# Patient Record
Sex: FEMALE | Race: BLACK OR AFRICAN AMERICAN | NOT HISPANIC OR LATINO | Employment: OTHER | URBAN - METROPOLITAN AREA
[De-identification: names, ages, dates, MRNs, and addresses within clinical notes are randomized per-mention and may not be internally consistent; named-entity substitution may affect disease eponyms.]

---

## 2023-10-02 ENCOUNTER — PHARMACY VISIT (OUTPATIENT)
Dept: PHARMACY | Facility: CLINIC | Age: 88
End: 2023-10-02
Payer: COMMERCIAL

## 2023-10-02 ENCOUNTER — SPECIALTY PHARMACY (OUTPATIENT)
Dept: PHARMACY | Facility: CLINIC | Age: 88
End: 2023-10-02

## 2023-10-02 PROCEDURE — RXMED WILLOW AMBULATORY MEDICATION CHARGE

## 2023-10-03 ENCOUNTER — NURSE TRIAGE (OUTPATIENT)
Dept: ADMISSION | Facility: HOSPITAL | Age: 88
End: 2023-10-03
Payer: MEDICARE
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
## 2023-10-18 ENCOUNTER — SPECIALTY PHARMACY (OUTPATIENT)
Dept: PHARMACY | Facility: CLINIC | Age: 88
End: 2023-10-18

## 2023-10-18 RX ORDER — PREGABALIN 25 MG/1
25 CAPSULE ORAL 2 TIMES DAILY
COMMUNITY
Start: 2023-07-11

## 2023-10-18 RX ORDER — SODIUM CHLORIDE 1 G/1
1 TABLET ORAL 2 TIMES DAILY
COMMUNITY
Start: 2023-09-15

## 2023-10-29 PROBLEM — R00.0 TACHYCARDIA: Status: ACTIVE | Noted: 2023-10-29

## 2023-10-29 PROBLEM — D70.9 NEUTROPENIA (CMS-HCC): Status: ACTIVE | Noted: 2023-10-29

## 2023-10-29 PROBLEM — E05.90 HYPERTHYROIDISM: Status: ACTIVE | Noted: 2023-10-29

## 2023-10-29 PROBLEM — F41.9 ANXIETY: Status: ACTIVE | Noted: 2023-10-29

## 2023-10-29 PROBLEM — R41.82 ALTERED MENTAL STATUS: Status: ACTIVE | Noted: 2022-10-27

## 2023-10-29 PROBLEM — E87.1 HYPONATREMIA: Status: ACTIVE | Noted: 2023-10-29

## 2023-10-29 PROBLEM — I48.92 ATRIAL FLUTTER WITH RAPID VENTRICULAR RESPONSE (MULTI): Status: ACTIVE | Noted: 2023-10-29

## 2023-10-29 PROBLEM — R79.0 LOW MAGNESIUM LEVEL: Status: ACTIVE | Noted: 2023-10-29

## 2023-10-29 PROBLEM — H11.32 SUBCONJUNCTIVAL HEMORRHAGE OF LEFT EYE: Status: ACTIVE | Noted: 2023-10-29

## 2023-10-29 PROBLEM — R25.2 MUSCLE CRAMPS: Status: ACTIVE | Noted: 2023-10-29

## 2023-10-29 PROBLEM — I10 BENIGN ESSENTIAL HYPERTENSION: Status: ACTIVE | Noted: 2023-10-29

## 2023-10-29 PROBLEM — R07.9 CHEST PAIN: Status: ACTIVE | Noted: 2020-05-21

## 2023-10-29 PROBLEM — R79.89 ELEVATED TROPONIN I LEVEL: Status: ACTIVE | Noted: 2020-01-07

## 2023-10-29 PROBLEM — E83.42 HYPOMAGNESEMIA: Status: ACTIVE | Noted: 2020-01-07

## 2023-10-29 PROBLEM — I48.91 ATRIAL FIBRILLATION WITH RVR (MULTI): Status: ACTIVE | Noted: 2023-10-29

## 2023-10-29 PROBLEM — B18.2 CHRONIC HEPATITIS C VIRUS INFECTION (MULTI): Status: ACTIVE | Noted: 2023-10-29

## 2023-10-29 PROBLEM — R41.0 CONFUSION AND DISORIENTATION: Status: ACTIVE | Noted: 2023-10-29

## 2023-10-29 PROBLEM — R42 LIGHTHEADEDNESS: Status: ACTIVE | Noted: 2020-01-08

## 2023-10-29 PROBLEM — I49.9 ARRHYTHMIA: Status: ACTIVE | Noted: 2020-08-27

## 2023-10-29 PROBLEM — R19.7 DIARRHEA: Status: ACTIVE | Noted: 2023-10-29

## 2023-10-29 PROBLEM — G89.3 NEOPLASM RELATED PAIN: Status: ACTIVE | Noted: 2023-10-29

## 2023-10-29 PROBLEM — K63.89 PNEUMATOSIS OF INTESTINES: Status: ACTIVE | Noted: 2023-10-29

## 2023-10-29 PROBLEM — D64.9 CHRONIC ANEMIA: Status: ACTIVE | Noted: 2023-10-29

## 2023-10-29 PROBLEM — R55 SYNCOPE: Status: ACTIVE | Noted: 2023-10-29

## 2023-10-29 PROBLEM — I10 ACCELERATED HYPERTENSION: Status: ACTIVE | Noted: 2023-10-29

## 2023-10-29 PROBLEM — R03.0 ELEVATED BLOOD PRESSURE READING: Status: ACTIVE | Noted: 2023-10-29

## 2023-10-29 PROBLEM — R00.2 PALPITATIONS: Status: ACTIVE | Noted: 2023-10-29

## 2023-10-29 PROBLEM — R53.1 WEAKNESS: Status: ACTIVE | Noted: 2023-10-29

## 2023-10-29 RX ORDER — AMLODIPINE BESYLATE 10 MG/1
1 TABLET ORAL DAILY
COMMUNITY
Start: 2020-08-28 | End: 2023-10-31 | Stop reason: ALTCHOICE

## 2023-10-29 RX ORDER — CHOLECALCIFEROL (VITAMIN D3) 25 MCG
1 TABLET ORAL DAILY
COMMUNITY

## 2023-10-29 RX ORDER — PYRIDOXINE HCL (VITAMIN B6) 25 MG
1 TABLET ORAL DAILY
COMMUNITY

## 2023-10-29 RX ORDER — AMLODIPINE BESYLATE 5 MG/1
1 TABLET ORAL DAILY
COMMUNITY

## 2023-10-29 RX ORDER — LORAZEPAM 0.5 MG/1
0.5 TABLET ORAL DAILY PRN
COMMUNITY
End: 2023-11-16 | Stop reason: SDUPTHER

## 2023-10-29 RX ORDER — ACETAMINOPHEN 325 MG/1
2 TABLET ORAL EVERY 4 HOURS PRN
COMMUNITY
Start: 2019-09-26

## 2023-10-29 RX ORDER — METHYLPREDNISOLONE 4 MG/1
TABLET ORAL
COMMUNITY
Start: 2022-05-11

## 2023-10-29 RX ORDER — LOPERAMIDE HYDROCHLORIDE 2 MG/1
CAPSULE ORAL
COMMUNITY

## 2023-10-29 RX ORDER — METOPROLOL SUCCINATE 100 MG/1
TABLET, EXTENDED RELEASE ORAL
COMMUNITY
Start: 2023-09-15

## 2023-10-29 RX ORDER — HYDROCODONE BITARTRATE AND ACETAMINOPHEN 10; 325 MG/1; MG/1
1 TABLET ORAL EVERY 6 HOURS PRN
COMMUNITY
Start: 2023-08-02

## 2023-10-29 RX ORDER — ROPINIROLE 0.5 MG/1
0.5 TABLET, FILM COATED ORAL NIGHTLY
COMMUNITY
Start: 2023-07-31

## 2023-10-29 RX ORDER — TALC
1 POWDER (GRAM) TOPICAL NIGHTLY PRN
COMMUNITY

## 2023-10-29 RX ORDER — ACETAMINOPHEN 500 MG
TABLET ORAL
COMMUNITY
Start: 2020-08-28

## 2023-10-29 RX ORDER — SPIRONOLACTONE 25 MG/1
25 TABLET ORAL DAILY
COMMUNITY

## 2023-10-29 RX ORDER — FAMOTIDINE 20 MG/1
20 TABLET, FILM COATED ORAL NIGHTLY
COMMUNITY
Start: 2023-08-27

## 2023-10-29 RX ORDER — LIDOCAINE 50 MG/G
PATCH TOPICAL
COMMUNITY
Start: 2022-05-06

## 2023-10-29 RX ORDER — METHIMAZOLE 5 MG/1
TABLET ORAL
COMMUNITY

## 2023-10-30 PROBLEM — C83.38 DIFFUSE LARGE B-CELL LYMPHOMA OF LYMPH NODES OF MULTIPLE REGIONS (MULTI): Status: ACTIVE | Noted: 2023-10-30

## 2023-10-31 ENCOUNTER — OFFICE VISIT (OUTPATIENT)
Dept: HEMATOLOGY/ONCOLOGY | Facility: CLINIC | Age: 88
End: 2023-10-31
Payer: MEDICARE

## 2023-10-31 ENCOUNTER — INFUSION (OUTPATIENT)
Dept: HEMATOLOGY/ONCOLOGY | Facility: CLINIC | Age: 88
End: 2023-10-31
Payer: MEDICARE

## 2023-10-31 ENCOUNTER — PHARMACY VISIT (OUTPATIENT)
Dept: PHARMACY | Facility: CLINIC | Age: 88
End: 2023-10-31
Payer: COMMERCIAL

## 2023-10-31 VITALS
HEART RATE: 62 BPM | BODY MASS INDEX: 26.5 KG/M2 | SYSTOLIC BLOOD PRESSURE: 159 MMHG | DIASTOLIC BLOOD PRESSURE: 74 MMHG | WEIGHT: 151.46 LBS | RESPIRATION RATE: 18 BRPM | TEMPERATURE: 97.5 F | OXYGEN SATURATION: 96 %

## 2023-10-31 DIAGNOSIS — E83.42 HYPOMAGNESEMIA: ICD-10-CM

## 2023-10-31 DIAGNOSIS — E83.42 HYPOMAGNESEMIA: Primary | ICD-10-CM

## 2023-10-31 DIAGNOSIS — C83.30 DIFFUSE LARGE B-CELL LYMPHOMA, UNSPECIFIED SITE (MULTI): ICD-10-CM

## 2023-10-31 DIAGNOSIS — C83.38 DIFFUSE LARGE B-CELL LYMPHOMA OF LYMPH NODES OF MULTIPLE REGIONS (MULTI): ICD-10-CM

## 2023-10-31 LAB
ALBUMIN SERPL BCP-MCNC: 4.2 G/DL (ref 3.4–5)
ALP SERPL-CCNC: 79 U/L (ref 33–136)
ALT SERPL W P-5'-P-CCNC: 39 U/L (ref 7–45)
ANION GAP SERPL CALC-SCNC: 16 MMOL/L (ref 10–20)
AST SERPL W P-5'-P-CCNC: 33 U/L (ref 9–39)
BILIRUB SERPL-MCNC: 0.7 MG/DL (ref 0–1.2)
BUN SERPL-MCNC: 14 MG/DL (ref 6–23)
CALCIUM SERPL-MCNC: 9.8 MG/DL (ref 8.6–10.6)
CHLORIDE SERPL-SCNC: 100 MMOL/L (ref 98–107)
CO2 SERPL-SCNC: 23 MMOL/L (ref 21–32)
CREAT SERPL-MCNC: 0.99 MG/DL (ref 0.5–1.05)
GFR SERPL CREATININE-BSD FRML MDRD: 56 ML/MIN/1.73M*2
GLUCOSE SERPL-MCNC: 96 MG/DL (ref 74–99)
LDH SERPL L TO P-CCNC: 176 U/L (ref 84–246)
MAGNESIUM SERPL-MCNC: 1.72 MG/DL (ref 1.6–2.4)
POTASSIUM SERPL-SCNC: 4 MMOL/L (ref 3.5–5.3)
PROT SERPL-MCNC: 6.5 G/DL (ref 6.4–8.2)
SODIUM SERPL-SCNC: 135 MMOL/L (ref 136–145)

## 2023-10-31 PROCEDURE — 3077F SYST BP >= 140 MM HG: CPT | Performed by: INTERNAL MEDICINE

## 2023-10-31 PROCEDURE — 1159F MED LIST DOCD IN RCRD: CPT | Performed by: INTERNAL MEDICINE

## 2023-10-31 PROCEDURE — RXMED WILLOW AMBULATORY MEDICATION CHARGE

## 2023-10-31 PROCEDURE — 36591 DRAW BLOOD OFF VENOUS DEVICE: CPT

## 2023-10-31 PROCEDURE — 3078F DIAST BP <80 MM HG: CPT | Performed by: INTERNAL MEDICINE

## 2023-10-31 PROCEDURE — 96523 IRRIG DRUG DELIVERY DEVICE: CPT

## 2023-10-31 PROCEDURE — 99214 OFFICE O/P EST MOD 30 MIN: CPT | Mod: PO | Performed by: INTERNAL MEDICINE

## 2023-10-31 PROCEDURE — 36415 COLL VENOUS BLD VENIPUNCTURE: CPT | Mod: PO

## 2023-10-31 PROCEDURE — 1125F AMNT PAIN NOTED PAIN PRSNT: CPT | Performed by: INTERNAL MEDICINE

## 2023-10-31 PROCEDURE — 85025 COMPLETE CBC W/AUTO DIFF WBC: CPT | Performed by: INTERNAL MEDICINE

## 2023-10-31 PROCEDURE — 83615 LACTATE (LD) (LDH) ENZYME: CPT | Performed by: INTERNAL MEDICINE

## 2023-10-31 PROCEDURE — 99214 OFFICE O/P EST MOD 30 MIN: CPT | Performed by: INTERNAL MEDICINE

## 2023-10-31 PROCEDURE — 80053 COMPREHEN METABOLIC PANEL: CPT | Performed by: INTERNAL MEDICINE

## 2023-10-31 PROCEDURE — 83735 ASSAY OF MAGNESIUM: CPT | Performed by: INTERNAL MEDICINE

## 2023-10-31 PROCEDURE — 2500000004 HC RX 250 GENERAL PHARMACY W/ HCPCS (ALT 636 FOR OP/ED): Performed by: INTERNAL MEDICINE

## 2023-10-31 RX ORDER — HEPARIN 100 UNIT/ML
500 SYRINGE INTRAVENOUS AS NEEDED
Status: CANCELLED | OUTPATIENT
Start: 2023-10-31

## 2023-10-31 RX ORDER — HEPARIN SODIUM,PORCINE/PF 10 UNIT/ML
50 SYRINGE (ML) INTRAVENOUS AS NEEDED
Status: CANCELLED | OUTPATIENT
Start: 2023-10-31

## 2023-10-31 RX ORDER — HEPARIN 100 UNIT/ML
500 SYRINGE INTRAVENOUS AS NEEDED
Status: DISCONTINUED | OUTPATIENT
Start: 2023-10-31 | End: 2023-10-31 | Stop reason: HOSPADM

## 2023-10-31 RX ADMIN — HEPARIN 500 UNITS: 100 SYRINGE at 14:00

## 2023-10-31 ASSESSMENT — ENCOUNTER SYMPTOMS
OCCASIONAL FEELINGS OF UNSTEADINESS: 0
DEPRESSION: 0
LOSS OF SENSATION IN FEET: 0

## 2023-10-31 ASSESSMENT — PAIN SCALES - GENERAL: PAINLEVEL: 3

## 2023-10-31 NOTE — PROGRESS NOTES
Patient ID: Simi Sandoval is a 88 y.o. female.    Diagnosis:   Problem List Items Addressed This Visit    None  Visit Diagnoses       Diffuse large B-cell lymphoma, unspecified site (CMS/HCC)        Relevant Orders    CBC and Auto Differential    Comprehensive Metabolic Panel    Lactate Dehydrogenase             Treatment:   Oncology History Overview Note   1. 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 CT imaging was negative for cycle 2 of chemotherapy. The patient subsequently received treatment with 30 Gy radiation to the left breast completing August 2008.    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  Bulky recurrence documented 8/30/19 after presentation with hyponatremia. Bronchoscopy at [facility] on 8/30/19  showed diffuse large B cell lymphoma non germinal center cell with double expression of bcl2 and cmyc, but not double hit.   PET scan from [facility] system done 9/9/19 ordered by Dr. Ulices Leon with findings: lobulated mass in RUL 3.6 x 2.4 cm with SUV 36.8, hilar lymphadenopathy on right  with SUV 32.6, also uptake pleura and intercostal musculature. Abdominal findings include  several abnormal foci in liver , portacaval area with SUV of 4.5, uptake in T1 suspicious.   Initiation of R-gem cis 9/17/19, Completed cycle 5 of R-gem cis on 12/26/2019 with negative PET imaging on 1/30/2020.  Initiation of revlimid maintenance February 2020. discontinued due to poor tolerance  10/2020 Dx with recurrent lyphoma left chest wall  12/1/2020 Started Tafasetinib (Monjuvi) and Revlimid salvage therapy for DLBCL relapse acheived a complete clinical remission.    She completed treatment with Revlimid 5 mg days 1-21/28 days on 11/4/21.  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 History of hep C infection diagnosed 2008, not currently on therapy.   2. Diverticulitis May 2012 an admission for diverticulitis in July 2013.   3. Gastroesophageal reflux disease, irritable bowel syndrome.   4. atrial fibrillation, in 2013, and in the setting of hyperthyroidism.   5.  Right breast biopsy 3/22/2012 for abnormal calcifications- benign  6. Chronic hyponatremia    Surgical History:  Lumbar spince surgery January 2017       Family History:   No family history on file.    Social History:       -------------------------------------------------------------------------------------------------------  Subjective       HPI    Patient seen for followup of her DLBCL,  today she complains of neuropathy of left hand, no neck pain, left had is a little bit weak,  so sometimes dropping things from her left hand.  Had CT scan of her neck August 2023 which did not show any worrisome adama findings. Wants to stop some meds, has visual problems, memory problems,  takes magoxide 1400 mg twice a day.  None of these complaints are new.       Review of Systems   All other systems reviewed and are negative.     -------------------------------------------------------------------------------------------------------  Objective   BSA: There is no height or weight on file to calculate BSA.  There were no vitals taken for this visit.    Physical Exam  Constitutional:       Appearance: Normal appearance.   HENT:      Head: Normocephalic and atraumatic.      Nose: Nose normal.   Eyes:      Extraocular Movements: Extraocular  movements intact.      Conjunctiva/sclera: Conjunctivae normal.      Pupils: Pupils are equal, round, and reactive to light.   Cardiovascular:      Rate and Rhythm: Normal rate and regular rhythm.   Pulmonary:      Breath sounds: Normal breath sounds.   Abdominal:      General: Abdomen is flat. Bowel sounds are normal.      Palpations: Abdomen is soft.   Musculoskeletal:         General: Normal range of motion.   Skin:     General: Skin is warm and dry.   Neurological:      General: No focal deficit present.      Mental Status: She is oriented to person, place, and time.      Motor: No weakness.      Comments: All muscles groups of right arm and hand with normal strength   Psychiatric:         Mood and Affect: Mood normal.         Behavior: Behavior normal.         Thought Content: Thought content normal.         Performance Status:  Symptomatic; fully ambulatory  -------------------------------------------------------------------------------------------------------  Assessment/Plan      DLBCL Third recurrence October 2020 extensive recurrence in left pleural as well as other sites.        Initiated Tafasitamab (Monjuvi) 12 g/kg IV  in combination with Revlimid on 12/1/20.   Monjuvi will be given on day 1,4,8,15, 22 of 28 day cycle for first cycle, then weekly (days 1,8,15, & 22) for cycles 2,3, then cycles 4 and beyond on days 1 & 15/28  day cycles.  Acheived CR     PET imaging (5/8/23) - interval complete resolution of hypermetabolic activity involving a peripancreatic lymph node.  Additionally, the previously described periaortic lymph node has also demonstrated complete interval resolution of hypermetabolic activity that was present in previous imaging on 2/1/23 but now describes a new right level IVB lymph node demonstrating  intense hypermetabolic activity with maximum SUV of 5.1      Completed 33 cycles of Tafasitamab (Monjuvi) scheduled on May  16 2023 and requested treatment  holiday.   Disease surveillance  8/15/23 [facility]     In the future  bite molecules would be an option.         Hyponatremia  Admission (10/26-10/28/22) for hyponatremia (123).   Serum sodium today 135 on sodium pills.  Followup with  Yousif (Nephrologist). Try includes fluid up to two quarts a day.      #. Pain management -  currently under good control on Lyrica and restless leg med. No longer seeing pall med,   Anxiety -   as needed ativan        3. History of hepatitis C. The patient has been followed by Dr. Boby Hale in  past and now followed by her PCP Dr. Coulter at [facility], unclear what tests have been done. LFTs have been normal.     4. Atrial fibrillation and hypertension:  Patent has had regular followup with cardiologist  at [facility] ( Dr. Paul Harris). Continues Metoprolol with rate control.  Also on Eliquis 5mg BID  & Amlodipine.  Sent to  specialty      5. Mild memory loss-  seems to be functioning pretty well  not clear whether she has seen geriatrics.     6. Health maintenance- Prevnar 13 vaccine updated  10/29/16, has had flu shot 2019.  Mammogram completed 6/2019. Has completed COVID vaccines  and booster and Evusheld April 2022.     7.. Psychosocial- anxiety and depression as above.     8.  Goals of Care: last confirmed 10/31/23 that the patient does not want to be resuscitated or be placed on life support systems.  She would want  afib addressed. Patient informed me that although she has been  from her  for years, they are still . Strongly encouraged patient to complete health care POA to determine who would make medica decisions Debbie STANTON in to consult      RTC: bimonthly for mediport flushes, followup in about 2months. Reimaging CT scans February 2023                -------------------------------------------------------------------------------------------------------  Stella Mac MD

## 2023-11-01 LAB
BASOPHILS # BLD AUTO: 0.07 X10*3/UL (ref 0–0.1)
BASOPHILS NFR BLD AUTO: 0.8 %
EOSINOPHIL # BLD AUTO: 0.09 X10*3/UL (ref 0–0.4)
EOSINOPHIL NFR BLD AUTO: 1 %
ERYTHROCYTE [DISTWIDTH] IN BLOOD BY AUTOMATED COUNT: 13.1 % (ref 11.5–14.5)
HCT VFR BLD AUTO: 46.4 % (ref 36–46)
HGB BLD-MCNC: 15.8 G/DL (ref 12–16)
IMM GRANULOCYTES # BLD AUTO: 0.04 X10*3/UL (ref 0–0.5)
IMM GRANULOCYTES NFR BLD AUTO: 0.4 % (ref 0–0.9)
LYMPHOCYTES # BLD AUTO: 1.16 X10*3/UL (ref 0.8–3)
LYMPHOCYTES NFR BLD AUTO: 12.7 %
MCH RBC QN AUTO: 32.2 PG (ref 26–34)
MCHC RBC AUTO-ENTMCNC: 34.1 G/DL (ref 32–36)
MCV RBC AUTO: 95 FL (ref 80–100)
MONOCYTES # BLD AUTO: 1.54 X10*3/UL (ref 0.05–0.8)
MONOCYTES NFR BLD AUTO: 16.9 %
NEUTROPHILS # BLD AUTO: 6.22 X10*3/UL (ref 1.6–5.5)
NEUTROPHILS NFR BLD AUTO: 68.2 %
NRBC BLD-RTO: 0 /100 WBCS (ref 0–0)
PLATELET # BLD AUTO: 269 X10*3/UL (ref 150–450)
PMV BLD AUTO: 12.2 FL (ref 7.5–11.5)
RBC # BLD AUTO: 4.9 X10*6/UL (ref 4–5.2)
WBC # BLD AUTO: 9.1 X10*3/UL (ref 4.4–11.3)

## 2023-11-14 DIAGNOSIS — C83.38 DIFFUSE LARGE B-CELL LYMPHOMA OF LYMPH NODES OF MULTIPLE REGIONS (MULTI): ICD-10-CM

## 2023-11-16 ENCOUNTER — TELEPHONE (OUTPATIENT)
Dept: ADMISSION | Facility: HOSPITAL | Age: 88
End: 2023-11-16
Payer: MEDICARE

## 2023-11-16 DIAGNOSIS — F41.9 ANXIETY: Primary | ICD-10-CM

## 2023-11-16 RX ORDER — LORAZEPAM 0.5 MG/1
0.5 TABLET ORAL DAILY PRN
Qty: 30 TABLET | Refills: 0 | Status: SHIPPED | OUTPATIENT
Start: 2023-11-16 | End: 2024-01-16 | Stop reason: SDUPTHER

## 2023-11-16 NOTE — TELEPHONE ENCOUNTER
Pt called the refill line requesting a refill on her Ativan 0.5mg to be sent to the [facility] on file. Message sent to the team.

## 2023-11-28 ENCOUNTER — TELEPHONE (OUTPATIENT)
Dept: ADMISSION | Facility: HOSPITAL | Age: 88
End: 2023-11-28
Payer: MEDICARE

## 2023-11-28 DIAGNOSIS — C83.38 DIFFUSE LARGE B-CELL LYMPHOMA OF LYMPH NODES OF MULTIPLE REGIONS (MULTI): Primary | ICD-10-CM

## 2023-11-28 NOTE — TELEPHONE ENCOUNTER
Left message informing pt of appt 12/4/23 at 2pm at [facility] for port flush.  Scheduling orders placed.

## 2023-11-30 ENCOUNTER — SPECIALTY PHARMACY (OUTPATIENT)
Dept: PHARMACY | Facility: CLINIC | Age: 88
End: 2023-11-30

## 2023-11-30 ENCOUNTER — PHARMACY VISIT (OUTPATIENT)
Dept: PHARMACY | Facility: CLINIC | Age: 88
End: 2023-11-30
Payer: COMMERCIAL

## 2023-11-30 PROCEDURE — RXMED WILLOW AMBULATORY MEDICATION CHARGE

## 2023-12-04 ENCOUNTER — INFUSION (OUTPATIENT)
Dept: HEMATOLOGY/ONCOLOGY | Facility: CLINIC | Age: 88
End: 2023-12-04
Payer: MEDICARE

## 2023-12-04 VITALS
SYSTOLIC BLOOD PRESSURE: 148 MMHG | BODY MASS INDEX: 26.84 KG/M2 | WEIGHT: 153.4 LBS | RESPIRATION RATE: 20 BRPM | TEMPERATURE: 97.2 F | OXYGEN SATURATION: 96 % | DIASTOLIC BLOOD PRESSURE: 74 MMHG | HEART RATE: 66 BPM

## 2023-12-04 DIAGNOSIS — C83.30 DIFFUSE LARGE B-CELL LYMPHOMA, UNSPECIFIED SITE (MULTI): ICD-10-CM

## 2023-12-04 DIAGNOSIS — C83.38 DIFFUSE LARGE B-CELL LYMPHOMA OF LYMPH NODES OF MULTIPLE REGIONS (MULTI): ICD-10-CM

## 2023-12-04 LAB
BASOPHILS # BLD AUTO: 0.05 X10*3/UL (ref 0–0.1)
BASOPHILS NFR BLD AUTO: 0.6 %
EOSINOPHIL # BLD AUTO: 0.14 X10*3/UL (ref 0–0.4)
EOSINOPHIL NFR BLD AUTO: 1.7 %
ERYTHROCYTE [DISTWIDTH] IN BLOOD BY AUTOMATED COUNT: 12.8 % (ref 11.5–14.5)
HCT VFR BLD AUTO: 43.5 % (ref 36–46)
HGB BLD-MCNC: 14.7 G/DL (ref 12–16)
IMM GRANULOCYTES # BLD AUTO: 0.02 X10*3/UL (ref 0–0.5)
IMM GRANULOCYTES NFR BLD AUTO: 0.2 % (ref 0–0.9)
LYMPHOCYTES # BLD AUTO: 1.03 X10*3/UL (ref 0.8–3)
LYMPHOCYTES NFR BLD AUTO: 12.4 %
MCH RBC QN AUTO: 32.4 PG (ref 26–34)
MCHC RBC AUTO-ENTMCNC: 33.8 G/DL (ref 32–36)
MCV RBC AUTO: 96 FL (ref 80–100)
MONOCYTES # BLD AUTO: 1.21 X10*3/UL (ref 0.05–0.8)
MONOCYTES NFR BLD AUTO: 14.6 %
NEUTROPHILS # BLD AUTO: 5.85 X10*3/UL (ref 1.6–5.5)
NEUTROPHILS NFR BLD AUTO: 70.5 %
NRBC BLD-RTO: ABNORMAL /100{WBCS}
PLATELET # BLD AUTO: 260 X10*3/UL (ref 150–450)
RBC # BLD AUTO: 4.54 X10*6/UL (ref 4–5.2)
WBC # BLD AUTO: 8.3 X10*3/UL (ref 4.4–11.3)

## 2023-12-04 PROCEDURE — 85025 COMPLETE CBC W/AUTO DIFF WBC: CPT | Performed by: INTERNAL MEDICINE

## 2023-12-04 PROCEDURE — 36591 DRAW BLOOD OFF VENOUS DEVICE: CPT

## 2023-12-04 PROCEDURE — 96523 IRRIG DRUG DELIVERY DEVICE: CPT

## 2023-12-04 PROCEDURE — 2500000004 HC RX 250 GENERAL PHARMACY W/ HCPCS (ALT 636 FOR OP/ED): Performed by: INTERNAL MEDICINE

## 2023-12-04 PROCEDURE — 80053 COMPREHEN METABOLIC PANEL: CPT | Performed by: INTERNAL MEDICINE

## 2023-12-04 PROCEDURE — 83615 LACTATE (LD) (LDH) ENZYME: CPT | Performed by: INTERNAL MEDICINE

## 2023-12-04 RX ORDER — HEPARIN 100 UNIT/ML
500 SYRINGE INTRAVENOUS AS NEEDED
Status: DISCONTINUED | OUTPATIENT
Start: 2023-12-04 | End: 2023-12-04 | Stop reason: HOSPADM

## 2023-12-04 RX ORDER — HEPARIN 100 UNIT/ML
500 SYRINGE INTRAVENOUS AS NEEDED
Status: CANCELLED | OUTPATIENT
Start: 2023-12-04

## 2023-12-04 RX ORDER — HEPARIN SODIUM,PORCINE/PF 10 UNIT/ML
50 SYRINGE (ML) INTRAVENOUS AS NEEDED
Status: CANCELLED | OUTPATIENT
Start: 2023-12-04

## 2023-12-04 RX ADMIN — Medication 500 UNITS: at 14:26

## 2023-12-04 ASSESSMENT — PAIN SCALES - GENERAL: PAINLEVEL: 0-NO PAIN

## 2023-12-05 LAB
ALBUMIN SERPL BCP-MCNC: 4.1 G/DL (ref 3.4–5)
ALP SERPL-CCNC: 71 U/L (ref 33–136)
ALT SERPL W P-5'-P-CCNC: 29 U/L (ref 7–45)
ANION GAP SERPL CALC-SCNC: 15 MMOL/L (ref 10–20)
AST SERPL W P-5'-P-CCNC: 32 U/L (ref 9–39)
BILIRUB SERPL-MCNC: 0.6 MG/DL (ref 0–1.2)
BUN SERPL-MCNC: 14 MG/DL (ref 6–23)
CALCIUM SERPL-MCNC: 9.6 MG/DL (ref 8.6–10.6)
CHLORIDE SERPL-SCNC: 103 MMOL/L (ref 98–107)
CO2 SERPL-SCNC: 22 MMOL/L (ref 21–32)
CREAT SERPL-MCNC: 1.03 MG/DL (ref 0.5–1.05)
GFR SERPL CREATININE-BSD FRML MDRD: 53 ML/MIN/1.73M*2
GLUCOSE SERPL-MCNC: 91 MG/DL (ref 74–99)
LDH SERPL L TO P-CCNC: 197 U/L (ref 84–246)
POTASSIUM SERPL-SCNC: 4.8 MMOL/L (ref 3.5–5.3)
PROT SERPL-MCNC: 6.1 G/DL (ref 6.4–8.2)
SODIUM SERPL-SCNC: 135 MMOL/L (ref 136–145)

## 2023-12-21 ENCOUNTER — HOSPITAL ENCOUNTER (EMERGENCY)
Facility: HOSPITAL | Age: 88
Discharge: HOME | End: 2023-12-21
Attending: STUDENT IN AN ORGANIZED HEALTH CARE EDUCATION/TRAINING PROGRAM
Payer: MEDICARE

## 2023-12-21 VITALS
RESPIRATION RATE: 16 BRPM | DIASTOLIC BLOOD PRESSURE: 58 MMHG | TEMPERATURE: 97.9 F | OXYGEN SATURATION: 95 % | SYSTOLIC BLOOD PRESSURE: 129 MMHG | HEART RATE: 72 BPM

## 2023-12-21 DIAGNOSIS — R19.7 DIARRHEA, UNSPECIFIED TYPE: Primary | ICD-10-CM

## 2023-12-21 LAB
ALBUMIN SERPL BCP-MCNC: 3.7 G/DL (ref 3.4–5)
ALP SERPL-CCNC: 130 U/L (ref 33–136)
ALT SERPL W P-5'-P-CCNC: 68 U/L (ref 7–45)
ANION GAP SERPL CALC-SCNC: 14 MMOL/L (ref 10–20)
APPEARANCE UR: CLEAR
AST SERPL W P-5'-P-CCNC: 91 U/L (ref 9–39)
BACTERIA #/AREA URNS AUTO: ABNORMAL /HPF
BASOPHILS # BLD AUTO: 0.04 X10*3/UL (ref 0–0.1)
BASOPHILS NFR BLD AUTO: 0.4 %
BILIRUB SERPL-MCNC: 0.8 MG/DL (ref 0–1.2)
BILIRUB UR STRIP.AUTO-MCNC: NEGATIVE MG/DL
BUN SERPL-MCNC: 11 MG/DL (ref 6–23)
CALCIUM SERPL-MCNC: 8.7 MG/DL (ref 8.6–10.3)
CHLORIDE SERPL-SCNC: 98 MMOL/L (ref 98–107)
CO2 SERPL-SCNC: 22 MMOL/L (ref 21–32)
COLOR UR: YELLOW
CREAT SERPL-MCNC: 1.12 MG/DL (ref 0.5–1.05)
EOSINOPHIL # BLD AUTO: 0.04 X10*3/UL (ref 0–0.4)
EOSINOPHIL NFR BLD AUTO: 0.4 %
ERYTHROCYTE [DISTWIDTH] IN BLOOD BY AUTOMATED COUNT: 12.5 % (ref 11.5–14.5)
GFR SERPL CREATININE-BSD FRML MDRD: 48 ML/MIN/1.73M*2
GLUCOSE BLD MANUAL STRIP-MCNC: 141 MG/DL (ref 74–99)
GLUCOSE SERPL-MCNC: 126 MG/DL (ref 74–99)
GLUCOSE UR STRIP.AUTO-MCNC: NEGATIVE MG/DL
HCT VFR BLD AUTO: 42.7 % (ref 36–46)
HGB BLD-MCNC: 14.9 G/DL (ref 12–16)
IMM GRANULOCYTES # BLD AUTO: 0.04 X10*3/UL (ref 0–0.5)
IMM GRANULOCYTES NFR BLD AUTO: 0.4 % (ref 0–0.9)
KETONES UR STRIP.AUTO-MCNC: NEGATIVE MG/DL
LEUKOCYTE ESTERASE UR QL STRIP.AUTO: ABNORMAL
LIPASE SERPL-CCNC: 24 U/L (ref 9–82)
LYMPHOCYTES # BLD AUTO: 0.42 X10*3/UL (ref 0.8–3)
LYMPHOCYTES NFR BLD AUTO: 4.5 %
MCH RBC QN AUTO: 32.7 PG (ref 26–34)
MCHC RBC AUTO-ENTMCNC: 34.9 G/DL (ref 32–36)
MCV RBC AUTO: 94 FL (ref 80–100)
MONOCYTES # BLD AUTO: 1.12 X10*3/UL (ref 0.05–0.8)
MONOCYTES NFR BLD AUTO: 11.9 %
NEUTROPHILS # BLD AUTO: 7.75 X10*3/UL (ref 1.6–5.5)
NEUTROPHILS NFR BLD AUTO: 82.4 %
NITRITE UR QL STRIP.AUTO: NEGATIVE
NRBC BLD-RTO: 0 /100 WBCS (ref 0–0)
PH UR STRIP.AUTO: 7 [PH]
PLATELET # BLD AUTO: 284 X10*3/UL (ref 150–450)
POTASSIUM SERPL-SCNC: 4.3 MMOL/L (ref 3.5–5.3)
PROT SERPL-MCNC: 5.9 G/DL (ref 6.4–8.2)
PROT UR STRIP.AUTO-MCNC: NEGATIVE MG/DL
RBC # BLD AUTO: 4.56 X10*6/UL (ref 4–5.2)
RBC # UR STRIP.AUTO: NEGATIVE /UL
RBC #/AREA URNS AUTO: ABNORMAL /HPF
SODIUM SERPL-SCNC: 130 MMOL/L (ref 136–145)
SP GR UR STRIP.AUTO: 1.01
SQUAMOUS #/AREA URNS AUTO: ABNORMAL /HPF
UROBILINOGEN UR STRIP.AUTO-MCNC: <2 MG/DL
WBC # BLD AUTO: 9.4 X10*3/UL (ref 4.4–11.3)
WBC #/AREA URNS AUTO: ABNORMAL /HPF

## 2023-12-21 PROCEDURE — 2500000004 HC RX 250 GENERAL PHARMACY W/ HCPCS (ALT 636 FOR OP/ED): Performed by: STUDENT IN AN ORGANIZED HEALTH CARE EDUCATION/TRAINING PROGRAM

## 2023-12-21 PROCEDURE — 2500000004 HC RX 250 GENERAL PHARMACY W/ HCPCS (ALT 636 FOR OP/ED): Performed by: PHYSICIAN ASSISTANT

## 2023-12-21 PROCEDURE — 99283 EMERGENCY DEPT VISIT LOW MDM: CPT | Mod: 25

## 2023-12-21 PROCEDURE — 82947 ASSAY GLUCOSE BLOOD QUANT: CPT

## 2023-12-21 PROCEDURE — 99284 EMERGENCY DEPT VISIT MOD MDM: CPT | Mod: 25 | Performed by: STUDENT IN AN ORGANIZED HEALTH CARE EDUCATION/TRAINING PROGRAM

## 2023-12-21 PROCEDURE — 85025 COMPLETE CBC W/AUTO DIFF WBC: CPT | Performed by: PHYSICIAN ASSISTANT

## 2023-12-21 PROCEDURE — 96360 HYDRATION IV INFUSION INIT: CPT

## 2023-12-21 PROCEDURE — 83690 ASSAY OF LIPASE: CPT | Performed by: PHYSICIAN ASSISTANT

## 2023-12-21 PROCEDURE — 80053 COMPREHEN METABOLIC PANEL: CPT | Performed by: PHYSICIAN ASSISTANT

## 2023-12-21 PROCEDURE — 81001 URINALYSIS AUTO W/SCOPE: CPT | Performed by: PHYSICIAN ASSISTANT

## 2023-12-21 RX ORDER — HEPARIN 100 UNIT/ML
5 SYRINGE INTRAVENOUS ONCE
Status: COMPLETED | OUTPATIENT
Start: 2023-12-21 | End: 2023-12-21

## 2023-12-21 RX ADMIN — HEPARIN 500 UNITS: 100 SYRINGE at 12:55

## 2023-12-21 RX ADMIN — SODIUM CHLORIDE 500 ML: 9 INJECTION, SOLUTION INTRAVENOUS at 10:20

## 2023-12-21 ASSESSMENT — COLUMBIA-SUICIDE SEVERITY RATING SCALE - C-SSRS
1. IN THE PAST MONTH, HAVE YOU WISHED YOU WERE DEAD OR WISHED YOU COULD GO TO SLEEP AND NOT WAKE UP?: NO
2. HAVE YOU ACTUALLY HAD ANY THOUGHTS OF KILLING YOURSELF?: NO
6. HAVE YOU EVER DONE ANYTHING, STARTED TO DO ANYTHING, OR PREPARED TO DO ANYTHING TO END YOUR LIFE?: NO

## 2023-12-21 NOTE — ED PROVIDER NOTES
"HPI   Chief Complaint   Patient presents with   • Diarrhea       88-year-old female presents with diarrhea since beginning of December.  She was diagnosed with COVID December 6 and took a course of Paxlovid.  Her COVID symptoms have improved while she has a persistent cough without shortness of breath chest pain pleuritic pain.  No hemoptysis.  She has been taking Pepto-Bismol resulting in black stools with diarrhea less than 5 times daily.  Denies abdominal pain.  No urinary symptoms.  No fever.  No nausea vomiting.  She is on Eliquis.  History of diverticulitis but currently denies having any pain.  Did not see PCP.  \"He is on vacation\".      History provided by:  Patient   used: No                        No data recorded                Patient History   No past medical history on file.  No past surgical history on file.  No family history on file.  Social History     Tobacco Use   • Smoking status: Never     Passive exposure: Never   • Smokeless tobacco: Never   Substance Use Topics   • Alcohol use: Not on file   • Drug use: Not on file       Physical Exam   ED Triage Vitals [12/21/23 0917]   Temp Heart Rate Resp BP   36.6 °C (97.9 °F) 87 16 156/71      SpO2 Temp src Heart Rate Source Patient Position   100 % -- -- --      BP Location FiO2 (%)     -- --       Physical Exam  Vitals and nursing note reviewed.   Constitutional:       General: She is not in acute distress.     Appearance: Normal appearance. She is normal weight. She is not ill-appearing, toxic-appearing or diaphoretic.   HENT:      Head: Normocephalic and atraumatic.   Eyes:      Extraocular Movements: Extraocular movements intact.      Pupils: Pupils are equal, round, and reactive to light.   Cardiovascular:      Rate and Rhythm: Normal rate and regular rhythm.   Pulmonary:      Effort: Pulmonary effort is normal.      Breath sounds: Normal breath sounds.   Abdominal:      General: There is no distension.      Palpations: Abdomen " is soft.      Tenderness: There is no abdominal tenderness.   Musculoskeletal:         General: Normal range of motion.      Cervical back: Normal range of motion and neck supple.   Skin:     General: Skin is warm and dry.   Neurological:      General: No focal deficit present.      Mental Status: She is alert and oriented to person, place, and time.   Psychiatric:         Mood and Affect: Mood normal.         Behavior: Behavior normal.         Thought Content: Thought content normal.         Judgment: Judgment normal.         ED Course & MDM   ED Course as of 12/21/23 1232   Thu Dec 21, 2023   1152 Urinalysis 1-5 white cells no red cells.  CBC normal.  Chemistries glucose 126.  Sodium 130.  Creatinine 1.12.  GFR 48. [GA]      ED Course User Index  [GA] Ken Rodriguez PA-C         Diagnoses as of 12/21/23 1232   Diarrhea, unspecified type     Labs Reviewed   CBC WITH AUTO DIFFERENTIAL - Abnormal       Result Value    WBC 9.4      nRBC 0.0      RBC 4.56      Hemoglobin 14.9      Hematocrit 42.7      MCV 94      MCH 32.7      MCHC 34.9      RDW 12.5      Platelets 284      Neutrophils % 82.4      Immature Granulocytes %, Automated 0.4      Lymphocytes % 4.5      Monocytes % 11.9      Eosinophils % 0.4      Basophils % 0.4      Neutrophils Absolute 7.75 (*)     Immature Granulocytes Absolute, Automated 0.04      Lymphocytes Absolute 0.42 (*)     Monocytes Absolute 1.12 (*)     Eosinophils Absolute 0.04      Basophils Absolute 0.04     COMPREHENSIVE METABOLIC PANEL - Abnormal    Glucose 126 (*)     Sodium 130 (*)     Potassium 4.3      Chloride 98      Bicarbonate 22      Anion Gap 14      Urea Nitrogen 11      Creatinine 1.12 (*)     eGFR 48 (*)     Calcium 8.7      Albumin 3.7      Alkaline Phosphatase 130      Total Protein 5.9 (*)     AST 91 (*)     Bilirubin, Total 0.8      ALT 68 (*)    URINALYSIS WITH REFLEX MICROSCOPIC - Abnormal    Color, Urine Yellow      Appearance, Urine Clear      Specific Gravity,  Urine 1.006      pH, Urine 7.0      Protein, Urine NEGATIVE      Glucose, Urine NEGATIVE      Blood, Urine NEGATIVE      Ketones, Urine NEGATIVE      Bilirubin, Urine NEGATIVE      Urobilinogen, Urine <2.0      Nitrite, Urine NEGATIVE      Leukocyte Esterase, Urine SMALL (1+) (*)    MICROSCOPIC ONLY, URINE - Abnormal    WBC, Urine 1-5      RBC, Urine NONE      Squamous Epithelial Cells, Urine 1-9 (SPARSE)      Bacteria, Urine 1+ (*)    POCT GLUCOSE - Abnormal    POCT Glucose 141 (*)    LIPASE      Medical Decision Making  Persistent diarrhea since beginning December.  Recent COVID infection with symptomatic improvement.  Reports black stools however taking Pepto-Bismol.  She is on anticoagulants.  IV fluids.  Labs reviewed EMR.  Normal CBC with normal white count normal H&H.  Creatinine 1.12.  Sodium 130.  Potassium normal.    The patient has also been seen and evaluated by the ER physician.  Patient is already taking Imodium.  Discharge continue outpatient management.  Agrees with ER assessment, disposition and plan.    Evaluation consistent with mild diarrhea post-COVID infection.  There is no abdominal pain distention or tenderness.  No evidence of GI bleed with normal H&H.  Dark stools likely due to the Pepto-Bismol.  Advised to continue Imodium.  Follow-up PCP.  Stable for discharge outpatient management.        Amount and/or Complexity of Data Reviewed  Labs: ordered. Decision-making details documented in ED Course.        Procedure  Procedures     Ken Rodriguez PA-C  12/21/23 1341

## 2023-12-21 NOTE — ED TRIAGE NOTES
To ed from home with concern for diarrhea x weeks. Was seen at Urgent care on Dec 6th for the same.

## 2023-12-30 PROCEDURE — RXMED WILLOW AMBULATORY MEDICATION CHARGE

## 2024-01-02 ENCOUNTER — SPECIALTY PHARMACY (OUTPATIENT)
Dept: PHARMACY | Facility: CLINIC | Age: 89
End: 2024-01-02

## 2024-01-02 ENCOUNTER — OFFICE VISIT (OUTPATIENT)
Dept: HEMATOLOGY/ONCOLOGY | Facility: CLINIC | Age: 89
End: 2024-01-02
Payer: MEDICARE

## 2024-01-02 ENCOUNTER — INFUSION (OUTPATIENT)
Dept: HEMATOLOGY/ONCOLOGY | Facility: CLINIC | Age: 89
End: 2024-01-02
Payer: MEDICARE

## 2024-01-02 ENCOUNTER — PHARMACY VISIT (OUTPATIENT)
Dept: PHARMACY | Facility: CLINIC | Age: 89
End: 2024-01-02
Payer: COMMERCIAL

## 2024-01-02 VITALS
HEART RATE: 69 BPM | BODY MASS INDEX: 24.87 KG/M2 | DIASTOLIC BLOOD PRESSURE: 61 MMHG | SYSTOLIC BLOOD PRESSURE: 133 MMHG | WEIGHT: 142.1 LBS | TEMPERATURE: 97.7 F | OXYGEN SATURATION: 95 %

## 2024-01-02 DIAGNOSIS — C83.38 DIFFUSE LARGE B-CELL LYMPHOMA OF LYMPH NODES OF MULTIPLE REGIONS (MULTI): Primary | ICD-10-CM

## 2024-01-02 DIAGNOSIS — C83.30 DIFFUSE LARGE B-CELL LYMPHOMA, UNSPECIFIED SITE (MULTI): ICD-10-CM

## 2024-01-02 DIAGNOSIS — C83.38 DIFFUSE LARGE B-CELL LYMPHOMA OF LYMPH NODES OF MULTIPLE REGIONS (MULTI): ICD-10-CM

## 2024-01-02 DIAGNOSIS — E83.42 HYPOMAGNESEMIA: ICD-10-CM

## 2024-01-02 LAB
BASOPHILS # BLD AUTO: 0.04 X10*3/UL (ref 0–0.1)
BASOPHILS NFR BLD AUTO: 0.5 %
EOSINOPHIL # BLD AUTO: 0.28 X10*3/UL (ref 0–0.4)
EOSINOPHIL NFR BLD AUTO: 3.5 %
ERYTHROCYTE [DISTWIDTH] IN BLOOD BY AUTOMATED COUNT: 12.8 % (ref 11.5–14.5)
HCT VFR BLD AUTO: 40.4 % (ref 36–46)
HGB BLD-MCNC: 13.8 G/DL (ref 12–16)
IMM GRANULOCYTES # BLD AUTO: 0.01 X10*3/UL (ref 0–0.5)
IMM GRANULOCYTES NFR BLD AUTO: 0.1 % (ref 0–0.9)
LYMPHOCYTES # BLD AUTO: 0.8 X10*3/UL (ref 0.8–3)
LYMPHOCYTES NFR BLD AUTO: 10.1 %
MCH RBC QN AUTO: 32.3 PG (ref 26–34)
MCHC RBC AUTO-ENTMCNC: 34.2 G/DL (ref 32–36)
MCV RBC AUTO: 95 FL (ref 80–100)
MONOCYTES # BLD AUTO: 1.51 X10*3/UL (ref 0.05–0.8)
MONOCYTES NFR BLD AUTO: 19.1 %
NEUTROPHILS # BLD AUTO: 5.28 X10*3/UL (ref 1.6–5.5)
NEUTROPHILS NFR BLD AUTO: 66.7 %
NRBC BLD-RTO: ABNORMAL /100{WBCS}
PLATELET # BLD AUTO: 240 X10*3/UL (ref 150–450)
RBC # BLD AUTO: 4.27 X10*6/UL (ref 4–5.2)
WBC # BLD AUTO: 7.9 X10*3/UL (ref 4.4–11.3)

## 2024-01-02 PROCEDURE — 85025 COMPLETE CBC W/AUTO DIFF WBC: CPT | Performed by: INTERNAL MEDICINE

## 2024-01-02 PROCEDURE — 99215 OFFICE O/P EST HI 40 MIN: CPT

## 2024-01-02 PROCEDURE — 1036F TOBACCO NON-USER: CPT

## 2024-01-02 PROCEDURE — 1160F RVW MEDS BY RX/DR IN RCRD: CPT

## 2024-01-02 PROCEDURE — 2500000004 HC RX 250 GENERAL PHARMACY W/ HCPCS (ALT 636 FOR OP/ED): Performed by: INTERNAL MEDICINE

## 2024-01-02 PROCEDURE — 83735 ASSAY OF MAGNESIUM: CPT | Performed by: INTERNAL MEDICINE

## 2024-01-02 PROCEDURE — 96523 IRRIG DRUG DELIVERY DEVICE: CPT

## 2024-01-02 PROCEDURE — 1125F AMNT PAIN NOTED PAIN PRSNT: CPT

## 2024-01-02 PROCEDURE — 1159F MED LIST DOCD IN RCRD: CPT

## 2024-01-02 PROCEDURE — 83615 LACTATE (LD) (LDH) ENZYME: CPT | Performed by: INTERNAL MEDICINE

## 2024-01-02 PROCEDURE — 3075F SYST BP GE 130 - 139MM HG: CPT

## 2024-01-02 PROCEDURE — 3078F DIAST BP <80 MM HG: CPT

## 2024-01-02 PROCEDURE — 80053 COMPREHEN METABOLIC PANEL: CPT | Performed by: INTERNAL MEDICINE

## 2024-01-02 PROCEDURE — 36415 COLL VENOUS BLD VENIPUNCTURE: CPT

## 2024-01-02 PROCEDURE — 36591 DRAW BLOOD OFF VENOUS DEVICE: CPT

## 2024-01-02 RX ORDER — HEPARIN 100 UNIT/ML
500 SYRINGE INTRAVENOUS AS NEEDED
Status: CANCELLED | OUTPATIENT
Start: 2024-01-02

## 2024-01-02 RX ORDER — HEPARIN SODIUM,PORCINE/PF 10 UNIT/ML
50 SYRINGE (ML) INTRAVENOUS AS NEEDED
Status: CANCELLED | OUTPATIENT
Start: 2024-01-02

## 2024-01-02 RX ORDER — HEPARIN 100 UNIT/ML
500 SYRINGE INTRAVENOUS AS NEEDED
Status: DISCONTINUED | OUTPATIENT
Start: 2024-01-02 | End: 2024-01-02 | Stop reason: HOSPADM

## 2024-01-02 RX ADMIN — HEPARIN 500 UNITS: 100 SYRINGE at 13:32

## 2024-01-02 ASSESSMENT — ENCOUNTER SYMPTOMS
FEVER: 0
ADENOPATHY: 0
LEG SWELLING: 1
APPETITE CHANGE: 1
DIAPHORESIS: 0
HEMATURIA: 0
NUMBNESS: 1
CHILLS: 0
UNEXPECTED WEIGHT CHANGE: 1
FATIGUE: 1
PALPITATIONS: 0
SHORTNESS OF BREATH: 0
CONSTIPATION: 0
LIGHT-HEADEDNESS: 1
WHEEZING: 0
DIARRHEA: 1
DYSURIA: 0
DIZZINESS: 0
COUGH: 0
WOUND: 0
VOMITING: 0
BLOOD IN STOOL: 0
NAUSEA: 0

## 2024-01-02 ASSESSMENT — PAIN SCALES - GENERAL: PAINLEVEL: 6

## 2024-01-02 NOTE — PROGRESS NOTES
Patient ID: Simi Sandoval is a 88 y.o. female.    Treatment:   Oncology History Overview Note   1. 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 CT imaging was negative for cycle 2 of chemotherapy. The patient subsequently received treatment with 30 Gy radiation to the left breast completing August 2008.    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  Bulky recurrence documented 8/30/19 after presentation with hyponatremia. Bronchoscopy at [facility] on 8/30/19  showed diffuse large B cell lymphoma non germinal center cell with double expression of bcl2 and cmyc, but not double hit.   PET scan from [email or web address] system done 9/9/19 ordered by Dr. Ulices Leon with findings: lobulated mass in RUL 3.6 x 2.4 cm with SUV 36.8, hilar lymphadenopathy on right  with SUV 32.6, also uptake pleura and intercostal musculature. Abdominal findings include  several abnormal foci in liver , portacaval area with SUV of 4.5, uptake in T1 suspicious.   Initiation of R-gem cis 9/17/19, Completed cycle 5 of R-gem cis on 12/26/2019 with negative PET imaging on 1/30/2020.  Initiation of revlimid maintenance February 2020. discontinued due to poor tolerance  10/2020 Dx with recurrent lyphoma left chest wall  12/1/2020 Started Tafasetinib (Monjuvi) and Revlimid salvage therapy for DLBCL relapse acheived a complete clinical remission.    She completed treatment with Revlimid 5 mg days 1-21/28 days on 11/4/21.  Remains on Tafasitamab (Monjuvi) on days 1 Last dose of Monjuvi after PET imaging 5/8/23 was clear except for questionable  area right cervical level IVB lymph node with SUV of 5.1 due to patient's preference     Diffuse large B-cell lymphoma of lymph nodes of multiple regions (CMS/HCC)    10/30/2023 Initial Diagnosis    Diffuse large B-cell lymphoma of lymph nodes of multiple regions (CMS/HCC)         Response:     Past Medical History:  1. History of hep C infection diagnosed 2008, not currently on therapy.   2. Diverticulitis May 2012 an admission for diverticulitis in July 2013.   3. Gastroesophageal reflux disease, irritable bowel syndrome.   4. atrial fibrillation, in 2013, and in the setting of hyperthyroidism.   5.  Right breast biopsy 3/22/2012 for abnormal calcifications- benign  6. Chronic hyponatremia    Surgical History:  Lumbar spince surgery January 2017     Family History:   No family history on file.    Social History:     Social History     Tobacco Use    Smoking status: Never     Passive exposure: Never    Smokeless tobacco: Never      -------------------------------------------------------------------------------------------------------  Subjective       HPI    Simi Sandoval is a 88 year old woman who had stage IA diffuse large B-cell lymphoma involving the left breast, initially diagnosed in 2007 with multiple recurrences, who initiated  (12/1/20) Tafasitinib (Monjuvi) and Revlimid salvage therapy for DLBCL relapse acheiving a complete clinical remission and is on tafasitinib maintenance every 2 weeks.      Most recent CT Staging for disease assessment (2/2/22) with no noncontrast CT evidence of intrathoracic or adb/pelvic lymphoproliferative process, usual fluid filled loops of bowel. Previously seen 3 mm left lower lobe pulmonary nodule is not conspicuous  on current imaging.     She completed treatment with Revlimid 5 mg days 1-21/28 days on 11/4/21.  Remains on Tafasitamab (Monjuvi) on days 1 Last dose of Monjuvi after PET imaging 5/8/23 was clear except for questionable  area right cervical level IVB lymph node with SUV of 5.1.  CT scan (8/18/23) [facility].  Patient currently on a treatment holiday.    Simi Sandoval is a 88 year old patient who presents to the clinic  today (1/2/24) for evaluation of her DLBCL involving left breast.  Reports her energy level is so-so, depends on the day.  Uses cane for ambulation due to back pain.  Diagnosed with covid on 12/6/23.  Treated with paxlovid for 5 days.  All her covid symptoms have resolved except for the fatigue.  Denies shortness of breath.  States she has a poor appetite, eats 3 meals per day.  Has lost about 5 pounds since positive with covid.    Chronic back pain, takes acetaminophen and needs to take norco about 5 times per week.  Loose stools occurs pretty much daily, multiple times per day.  Since positive with covid.      Review of Systems   Constitutional:  Positive for appetite change, fatigue and unexpected weight change. Negative for chills, diaphoresis and fever.   Respiratory:  Negative for cough, shortness of breath and wheezing.    Cardiovascular:  Positive for leg swelling (mild swelling to bilateral legs). Negative for chest pain and palpitations.   Gastrointestinal:  Positive for diarrhea. Negative for blood in stool, constipation, nausea and vomiting.   Genitourinary:  Negative for dysuria and hematuria.    Musculoskeletal:  Negative for gait problem.   Skin:  Negative for rash and wound.   Neurological:  Positive for light-headedness (when turns head too quickly) and numbness (left arm and hand). Negative for dizziness and gait problem.   Hematological:  Negative for adenopathy.      -------------------------------------------------------------------------------------------------------  Objective   BSA: 1.7 meters squared  /61   Pulse 69   Temp 36.5 °C (97.7 °F)   Wt 64.5 kg (142 lb 1.6 oz)   SpO2 95%   BMI 24.87 kg/m²     Physical Exam  Vitals reviewed.   Constitutional:       Appearance: Normal appearance.   HENT:      Head: Normocephalic.      Mouth/Throat:      Mouth: Mucous membranes are moist.   Cardiovascular:      Rate and Rhythm: Normal rate and regular rhythm.      Pulses: Normal pulses.       Heart sounds: Normal heart sounds. No murmur heard.     No friction rub. No gallop.   Pulmonary:      Effort: Pulmonary effort is normal. No respiratory distress.      Breath sounds: Normal breath sounds. No wheezing.   Abdominal:      General: Abdomen is flat. Bowel sounds are normal. There is no distension.      Palpations: Abdomen is soft. There is no mass.      Tenderness: There is no abdominal tenderness.   Musculoskeletal:         General: Normal range of motion.      Right lower leg: Edema (non-pitting) present.      Left lower leg: Edema (non-pitting) present.   Lymphadenopathy:      Comments: No lymphadenopathy   Skin:     General: Skin is warm and dry.      Comments: Possible lipoma to left shoulder, soft/rubbery, non-tender.   Neurological:      Mental Status: She is alert and oriented to person, place, and time. Mental status is at baseline.   Psychiatric:         Mood and Affect: Mood normal.       Performance Status:  ECOG Performance Status: 1 restricted from physically strenuous work  -------------------------------------------------------------------------------------------------------  Assessment/Plan      DLBCL Third recurrence October 2020 extensive recurrence in left pleural as well as other sites.        Initiated Tafasitamab (Monjuvi) 12 g/kg IV  in combination with Revlimid on 12/1/20.   Monjuvi will be given on day 1,4,8,15, 22 of 28 day cycle for first cycle, then weekly (days 1,8,15, & 22) for cycles 2,3, then cycles 4 and beyond on days 1 & 15/28  day cycles.  Acheived CR     PET imaging (5/8/23) - interval complete resolution of hypermetabolic activity involving a peripancreatic lymph node.  Additionally, the previously described periaortic lymph node has also demonstrated complete interval resolution of hypermetabolic activity that was present in previous imaging on 2/1/23 but now describes a new right level IVB lymph node demonstrating  intense hypermetabolic activity with maximum SUV  of 5.1      Completed 33 cycles of Tafasitamab (Monjuvi) scheduled on May 16 2023 and requested treatment holiday.   Disease surveillance 8/15/23 [facility]    1/2/24:  Hbg 13.8, Plts 240, WBC 7.9.  No evidence of disease progression.  Plan to have CT scan CAP and neck without IV contrast.  Follow up visit in 2 months.     In the future bite molecules would be an option.         Hyponatremia  Admission (10/26-10/28/22) for hyponatremia (123).   Serum sodium today 135 on sodium pills.  Followup with  Yousif (Nephrologist). Try includes fluid up to two quarts a day.   1/2/24:  Sodium 136     #. Pain management -  currently under good control on Lyrica and restless leg med. No longer seeing pall med,   Currently using acetaminophen, hydrocodone-acetaminophen, pregabalin, and lidocaine patches     3. History of hepatitis C. The patient has been followed by Dr. Boby Hale in  past and now followed by her PCP Dr. Coulter at [facility], unclear what tests have been done. LFTs have been normal.     4. Atrial fibrillation and hypertension:  Patent has had regular followup with cardiologist  at [facility] ( Dr. Paul Harris). Continues Metoprolol with rate control.  Also on Eliquis 5mg BID  & Amlodipine.      5. Mild memory loss-  seems to be functioning pretty well  not clear whether she has seen geriatrics.     6. Health maintenance- Prevnar 13 vaccine updated  10/29/16, has had flu shot 2019.  Mammogram completed 6/2019. Has completed COVID vaccines  and booster and Evusheld April 2022.  1/2/24:  Advised patient to receive flu, covid, RSV vaccines at any local pharmacy.     7. Psychosocial- anxiety and depression as above.  -uses prn lorazepam for anxiety     8.  Goals of Care: last confirmed 10/31/23 that the patient does not want to be resuscitated or be placed on life support systems.  She would want  afib addressed. Patient informed me that although she has been  from her  for years, they are still . Strongly  encouraged patient to complete health care POA to determine who would make medica decisions Debbie RIVERAW in to consult     Central Line:  Mediport to right chest, site with no erythema, tenderness, edema, or drainage     Hypomagnesia:  1/2/14:  Mg level 1.51.  Prescribed magnesium chloride 71.5 mg daily.    1/6/24:  Called patient twice with no answer.  Left voice message regarding magnesium level of 1.51.  Prescribed magnesium chloride 71.5 mg daily to [facility] pharmacy. Reviewed to take daily and informed of possible side effect of diarrhea.  Instructed patient to call office with questions or concerns.    RTC:  Infusion for port flush in 1 month  Follow up visit with Dr. Mac in 2 months with CT scan CAP and neck without IV contrast,  port flush and blood work.  -------------------------------------------------------------------------------------------------------  CORNELIUS Fields-RASHAAD

## 2024-01-03 LAB
ALBUMIN SERPL BCP-MCNC: 3.8 G/DL (ref 3.4–5)
ALP SERPL-CCNC: 84 U/L (ref 33–136)
ALT SERPL W P-5'-P-CCNC: 31 U/L (ref 7–45)
ANION GAP SERPL CALC-SCNC: 15 MMOL/L (ref 10–20)
AST SERPL W P-5'-P-CCNC: 34 U/L (ref 9–39)
BILIRUB SERPL-MCNC: 0.6 MG/DL (ref 0–1.2)
BUN SERPL-MCNC: 13 MG/DL (ref 6–23)
CALCIUM SERPL-MCNC: 9.2 MG/DL (ref 8.6–10.6)
CHLORIDE SERPL-SCNC: 101 MMOL/L (ref 98–107)
CO2 SERPL-SCNC: 24 MMOL/L (ref 21–32)
CREAT SERPL-MCNC: 1.01 MG/DL (ref 0.5–1.05)
GFR SERPL CREATININE-BSD FRML MDRD: 54 ML/MIN/1.73M*2
GLUCOSE SERPL-MCNC: 96 MG/DL (ref 74–99)
LDH SERPL L TO P-CCNC: 233 U/L (ref 84–246)
MAGNESIUM SERPL-MCNC: 1.51 MG/DL (ref 1.6–2.4)
POTASSIUM SERPL-SCNC: 4 MMOL/L (ref 3.5–5.3)
PROT SERPL-MCNC: 5.5 G/DL (ref 6.4–8.2)
SODIUM SERPL-SCNC: 136 MMOL/L (ref 136–145)

## 2024-01-10 ENCOUNTER — NURSE TRIAGE (OUTPATIENT)
Dept: ADMISSION | Facility: HOSPITAL | Age: 89
End: 2024-01-10
Payer: MEDICARE

## 2024-01-10 ENCOUNTER — TELEPHONE (OUTPATIENT)
Dept: HEMATOLOGY/ONCOLOGY | Facility: HOSPITAL | Age: 89
End: 2024-01-10

## 2024-01-10 DIAGNOSIS — E83.42 HYPOMAGNESEMIA: ICD-10-CM

## 2024-01-10 NOTE — TELEPHONE ENCOUNTER
Patient states that [facility] did not get the magnesium script. Patient informed that it was sent but we can resend.

## 2024-01-10 NOTE — TELEPHONE ENCOUNTER
Pt states that she has uncontrolled diarrhea- pt states that she has taken 4 doses of Imodium as directed on package and no relief.   Denies fever, bloody or black tarry stool, body aches, chills, n/v.   Pt states that she is taking OTC magnesium (500mg) and did not  script from pharmacy for magnesium chloride 71.5 mg DRHeather   Pt states she was unaware. Advised that she should not take any OTC medications without discussing with team- pt verbalized understanding and stop OTC and  script at pharmacy as directed.   Pt unable to state how much fluid she has had in past 24 hrs or how many Bms she has had.   Pt was in ED 12/21 and was referred to Gastroenterology but has not followed up with referral.   Pt encouraged to return to ED if diarrhea worsens, new onset fever, dizziness.   Pt transferred to schedulers.

## 2024-01-11 ENCOUNTER — SPECIALTY PHARMACY (OUTPATIENT)
Dept: PHARMACY | Facility: CLINIC | Age: 89
End: 2024-01-11

## 2024-01-15 ENCOUNTER — TELEPHONE (OUTPATIENT)
Dept: ADMISSION | Facility: HOSPITAL | Age: 89
End: 2024-01-15
Payer: MEDICARE

## 2024-01-15 NOTE — TELEPHONE ENCOUNTER
Pt reports there is an issue with magnesium chloride prescription.  Spoke to pharmacist and the medication is not covered by her plan.  Pt inquires what OTC alternative is recommended.

## 2024-01-15 NOTE — TELEPHONE ENCOUNTER
Per Bertha Grewal CNP, I spoke to our pharmacist and he recommend OTC magnesium brand called MagDelay 64 mg, take 1 tablet per day.  This magnesium should not cause as much gastrointestinal upset as some other forms of magnesium.    Discussed with patient who will look for this OTC product.    Pt also notes she needs a refill on lorazepam 0.5mg every day prn, #30.  Last prescribed 11/16/23.  Preferred pharmacy is [facility] in [place].

## 2024-01-15 NOTE — TELEPHONE ENCOUNTER
Pt reports that she was informed by [facility] Eliquis will cost $140 for a 30 day supply which is   She was previously getting medication through a patient assistance program.

## 2024-01-16 DIAGNOSIS — F41.9 ANXIETY: ICD-10-CM

## 2024-01-16 RX ORDER — LORAZEPAM 0.5 MG/1
0.5 TABLET ORAL DAILY PRN
Qty: 30 TABLET | Refills: 0 | Status: SHIPPED | OUTPATIENT
Start: 2024-01-16 | End: 2024-04-27 | Stop reason: SDUPTHER

## 2024-01-16 NOTE — TELEPHONE ENCOUNTER
Per [facility], pt needs to re-enroll in their internal VAF program.  Pt will contact [facility] for next steps, phone number provided.

## 2024-01-29 ENCOUNTER — SPECIALTY PHARMACY (OUTPATIENT)
Dept: PHARMACY | Facility: CLINIC | Age: 89
End: 2024-01-29

## 2024-01-29 NOTE — TELEPHONE ENCOUNTER
Pt paid a copay of $140 for eliquis but is now unsure how to re-enroll in [facility] program.  She states she spoke to someone at [facility] but remains unclear about next steps.

## 2024-01-29 NOTE — TELEPHONE ENCOUNTER
Per [facility], pt needs to submit proof of income with her most recent 1040 tax return or a social security statement if she does not file takes.  Once received, they will complete enrollment on her behalf.  Information can be faxed to [phone], emailed to [email or web address] or dropped off in person.

## 2024-01-31 ENCOUNTER — APPOINTMENT (OUTPATIENT)
Dept: GASTROENTEROLOGY | Facility: HOSPITAL | Age: 89
End: 2024-01-31
Payer: MEDICARE

## 2024-02-01 ENCOUNTER — PHARMACY VISIT (OUTPATIENT)
Dept: PHARMACY | Facility: CLINIC | Age: 89
End: 2024-02-01
Payer: COMMERCIAL

## 2024-02-01 ENCOUNTER — TELEPHONE (OUTPATIENT)
Dept: HEMATOLOGY/ONCOLOGY | Facility: CLINIC | Age: 89
End: 2024-02-01
Payer: MEDICARE

## 2024-02-01 ENCOUNTER — SPECIALTY PHARMACY (OUTPATIENT)
Dept: PHARMACY | Facility: CLINIC | Age: 89
End: 2024-02-01

## 2024-02-01 DIAGNOSIS — C83.38 DIFFUSE LARGE B-CELL LYMPHOMA OF LYMPH NODES OF MULTIPLE REGIONS (MULTI): Primary | ICD-10-CM

## 2024-02-01 PROCEDURE — RXMED WILLOW AMBULATORY MEDICATION CHARGE

## 2024-02-01 NOTE — TELEPHONE ENCOUNTER
Patient notified RX was submitted by Dr. Mac. Patient was transferred to [facility] line

## 2024-02-02 ENCOUNTER — APPOINTMENT (OUTPATIENT)
Dept: HEMATOLOGY/ONCOLOGY | Facility: CLINIC | Age: 89
End: 2024-02-02
Payer: MEDICARE

## 2024-02-15 ENCOUNTER — SPECIALTY PHARMACY (OUTPATIENT)
Dept: PHARMACY | Facility: CLINIC | Age: 89
End: 2024-02-15

## 2024-02-15 ENCOUNTER — TELEPHONE (OUTPATIENT)
Dept: ADMISSION | Facility: HOSPITAL | Age: 89
End: 2024-02-15
Payer: MEDICARE

## 2024-02-15 NOTE — TELEPHONE ENCOUNTER
Patient states she hs received another bill for [employer] for $238. Advised per previous phone note, to contact  Specialty to re-enroll in the VAF program for cost assistance  Verbalized understanding, [facility] phone number provided

## 2024-02-28 PROCEDURE — RXMED WILLOW AMBULATORY MEDICATION CHARGE

## 2024-03-01 ENCOUNTER — HOSPITAL ENCOUNTER (OUTPATIENT)
Dept: RADIOLOGY | Facility: CLINIC | Age: 89
Discharge: HOME | End: 2024-03-01
Payer: MEDICARE

## 2024-03-01 ENCOUNTER — INFUSION (OUTPATIENT)
Dept: HEMATOLOGY/ONCOLOGY | Facility: CLINIC | Age: 89
End: 2024-03-01
Payer: MEDICARE

## 2024-03-01 ENCOUNTER — PHARMACY VISIT (OUTPATIENT)
Dept: PHARMACY | Facility: CLINIC | Age: 89
End: 2024-03-01
Payer: COMMERCIAL

## 2024-03-01 VITALS
HEART RATE: 75 BPM | TEMPERATURE: 97.3 F | DIASTOLIC BLOOD PRESSURE: 75 MMHG | OXYGEN SATURATION: 96 % | RESPIRATION RATE: 18 BRPM | SYSTOLIC BLOOD PRESSURE: 136 MMHG

## 2024-03-01 DIAGNOSIS — C83.38 DIFFUSE LARGE B-CELL LYMPHOMA OF LYMPH NODES OF MULTIPLE REGIONS (MULTI): ICD-10-CM

## 2024-03-01 DIAGNOSIS — C83.30 DIFFUSE LARGE B-CELL LYMPHOMA, UNSPECIFIED SITE (MULTI): ICD-10-CM

## 2024-03-01 LAB
ALBUMIN SERPL BCP-MCNC: 3.7 G/DL (ref 3.4–5)
ALP SERPL-CCNC: 67 U/L (ref 33–136)
ALT SERPL W P-5'-P-CCNC: 26 U/L (ref 7–45)
ANION GAP SERPL CALC-SCNC: 13 MMOL/L (ref 10–20)
AST SERPL W P-5'-P-CCNC: 25 U/L (ref 9–39)
BASOPHILS # BLD AUTO: 0.06 X10*3/UL (ref 0–0.1)
BASOPHILS NFR BLD AUTO: 0.8 %
BILIRUB SERPL-MCNC: 0.6 MG/DL (ref 0–1.2)
BUN SERPL-MCNC: 10 MG/DL (ref 6–23)
CALCIUM SERPL-MCNC: 9 MG/DL (ref 8.6–10.6)
CHLORIDE SERPL-SCNC: 102 MMOL/L (ref 98–107)
CO2 SERPL-SCNC: 25 MMOL/L (ref 21–32)
CREAT SERPL-MCNC: 0.9 MG/DL (ref 0.5–1.05)
EGFRCR SERPLBLD CKD-EPI 2021: 62 ML/MIN/1.73M*2
EOSINOPHIL # BLD AUTO: 0.2 X10*3/UL (ref 0–0.4)
EOSINOPHIL NFR BLD AUTO: 2.6 %
ERYTHROCYTE [DISTWIDTH] IN BLOOD BY AUTOMATED COUNT: 12.2 % (ref 11.5–14.5)
GLUCOSE SERPL-MCNC: 94 MG/DL (ref 74–99)
HCT VFR BLD AUTO: 39.9 % (ref 36–46)
HGB BLD-MCNC: 13.3 G/DL (ref 12–16)
IMM GRANULOCYTES # BLD AUTO: 0.02 X10*3/UL (ref 0–0.5)
IMM GRANULOCYTES NFR BLD AUTO: 0.3 % (ref 0–0.9)
LDH SERPL L TO P-CCNC: 192 U/L (ref 84–246)
LYMPHOCYTES # BLD AUTO: 1.31 X10*3/UL (ref 0.8–3)
LYMPHOCYTES NFR BLD AUTO: 16.8 %
MAGNESIUM SERPL-MCNC: 1.59 MG/DL (ref 1.6–2.4)
MCH RBC QN AUTO: 30.9 PG (ref 26–34)
MCHC RBC AUTO-ENTMCNC: 33.3 G/DL (ref 32–36)
MCV RBC AUTO: 93 FL (ref 80–100)
MONOCYTES # BLD AUTO: 1.53 X10*3/UL (ref 0.05–0.8)
MONOCYTES NFR BLD AUTO: 19.6 %
NEUTROPHILS # BLD AUTO: 4.69 X10*3/UL (ref 1.6–5.5)
NEUTROPHILS NFR BLD AUTO: 59.9 %
NRBC BLD-RTO: ABNORMAL /100{WBCS}
PLATELET # BLD AUTO: 291 X10*3/UL (ref 150–450)
POTASSIUM SERPL-SCNC: 3.9 MMOL/L (ref 3.5–5.3)
PROT SERPL-MCNC: 5.8 G/DL (ref 6.4–8.2)
RBC # BLD AUTO: 4.31 X10*6/UL (ref 4–5.2)
SODIUM SERPL-SCNC: 136 MMOL/L (ref 136–145)
WBC # BLD AUTO: 7.8 X10*3/UL (ref 4.4–11.3)

## 2024-03-01 PROCEDURE — 36591 DRAW BLOOD OFF VENOUS DEVICE: CPT

## 2024-03-01 PROCEDURE — 83735 ASSAY OF MAGNESIUM: CPT

## 2024-03-01 PROCEDURE — 70490 CT SOFT TISSUE NECK W/O DYE: CPT

## 2024-03-01 PROCEDURE — 71250 CT THORAX DX C-: CPT | Performed by: RADIOLOGY

## 2024-03-01 PROCEDURE — 85025 COMPLETE CBC W/AUTO DIFF WBC: CPT | Performed by: INTERNAL MEDICINE

## 2024-03-01 PROCEDURE — 83615 LACTATE (LD) (LDH) ENZYME: CPT

## 2024-03-01 PROCEDURE — 70490 CT SOFT TISSUE NECK W/O DYE: CPT | Performed by: RADIOLOGY

## 2024-03-01 PROCEDURE — 80053 COMPREHEN METABOLIC PANEL: CPT

## 2024-03-01 PROCEDURE — 74176 CT ABD & PELVIS W/O CONTRAST: CPT

## 2024-03-01 PROCEDURE — 74176 CT ABD & PELVIS W/O CONTRAST: CPT | Performed by: RADIOLOGY

## 2024-03-01 RX ORDER — HEPARIN 100 UNIT/ML
500 SYRINGE INTRAVENOUS AS NEEDED
Status: CANCELLED | OUTPATIENT
Start: 2024-03-01

## 2024-03-01 RX ORDER — HEPARIN SODIUM,PORCINE/PF 10 UNIT/ML
50 SYRINGE (ML) INTRAVENOUS AS NEEDED
Status: CANCELLED | OUTPATIENT
Start: 2024-03-01

## 2024-03-01 RX ORDER — HEPARIN 100 UNIT/ML
500 SYRINGE INTRAVENOUS AS NEEDED
Status: DISCONTINUED | OUTPATIENT
Start: 2024-03-01 | End: 2024-03-01 | Stop reason: HOSPADM

## 2024-03-01 RX ORDER — HEPARIN SODIUM,PORCINE/PF 10 UNIT/ML
50 SYRINGE (ML) INTRAVENOUS AS NEEDED
Status: DISCONTINUED | OUTPATIENT
Start: 2024-03-01 | End: 2024-03-01 | Stop reason: HOSPADM

## 2024-03-01 ASSESSMENT — PAIN SCALES - GENERAL: PAINLEVEL: 2

## 2024-03-04 ASSESSMENT — ENCOUNTER SYMPTOMS
ADENOPATHY: 0
DYSURIA: 0
DIZZINESS: 0
DIAPHORESIS: 0
NUMBNESS: 1
LIGHT-HEADEDNESS: 1
VOMITING: 0
COUGH: 0
WOUND: 0
FEVER: 0
BLOOD IN STOOL: 0
FATIGUE: 1
HEMATURIA: 0
APPETITE CHANGE: 1
CONSTIPATION: 0
CHILLS: 0
PALPITATIONS: 0
SHORTNESS OF BREATH: 0
WHEEZING: 0
NAUSEA: 0

## 2024-03-05 ENCOUNTER — OFFICE VISIT (OUTPATIENT)
Dept: HEMATOLOGY/ONCOLOGY | Facility: CLINIC | Age: 89
End: 2024-03-05
Payer: MEDICARE

## 2024-03-05 ENCOUNTER — INFUSION (OUTPATIENT)
Dept: HEMATOLOGY/ONCOLOGY | Facility: CLINIC | Age: 89
End: 2024-03-05
Payer: MEDICARE

## 2024-03-05 VITALS
RESPIRATION RATE: 18 BRPM | HEART RATE: 65 BPM | BODY MASS INDEX: 25.02 KG/M2 | DIASTOLIC BLOOD PRESSURE: 58 MMHG | SYSTOLIC BLOOD PRESSURE: 132 MMHG | TEMPERATURE: 96.8 F | OXYGEN SATURATION: 95 % | WEIGHT: 143 LBS

## 2024-03-05 DIAGNOSIS — C83.38 DIFFUSE LARGE B-CELL LYMPHOMA OF LYMPH NODES OF MULTIPLE REGIONS (MULTI): ICD-10-CM

## 2024-03-05 PROCEDURE — 99214 OFFICE O/P EST MOD 30 MIN: CPT | Performed by: INTERNAL MEDICINE

## 2024-03-05 PROCEDURE — 1036F TOBACCO NON-USER: CPT | Performed by: INTERNAL MEDICINE

## 2024-03-05 PROCEDURE — 1125F AMNT PAIN NOTED PAIN PRSNT: CPT | Performed by: INTERNAL MEDICINE

## 2024-03-05 PROCEDURE — 1160F RVW MEDS BY RX/DR IN RCRD: CPT | Performed by: INTERNAL MEDICINE

## 2024-03-05 PROCEDURE — 1157F ADVNC CARE PLAN IN RCRD: CPT | Performed by: INTERNAL MEDICINE

## 2024-03-05 PROCEDURE — 1159F MED LIST DOCD IN RCRD: CPT | Performed by: INTERNAL MEDICINE

## 2024-03-05 ASSESSMENT — ENCOUNTER SYMPTOMS
UNEXPECTED WEIGHT CHANGE: 0
DIARRHEA: 0
LEG SWELLING: 0

## 2024-03-05 ASSESSMENT — PAIN SCALES - GENERAL: PAINLEVEL: 2

## 2024-03-05 NOTE — PROGRESS NOTES
Patient ID: Simi Sandoval is a 88 y.o. female.    Treatment:   Oncology History Overview Note   1. 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 CT imaging was negative for cycle 2 of chemotherapy. The patient subsequently received treatment with 30 Gy radiation to the left breast completing August 2008.    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  Bulky recurrence documented 8/30/19 after presentation with hyponatremia. Bronchoscopy at [facility] on 8/30/19  showed diffuse large B cell lymphoma non germinal center cell with double expression of bcl2 and cmyc, but not double hit.   PET scan from [facility] system done 9/9/19 ordered by Dr. Ulices Leon with findings: lobulated mass in RUL 3.6 x 2.4 cm with SUV 36.8, hilar lymphadenopathy on right  with SUV 32.6, also uptake pleura and intercostal musculature. Abdominal findings include  several abnormal foci in liver , portacaval area with SUV of 4.5, uptake in T1 suspicious.   Initiation of R-gem cis 9/17/19, Completed cycle 5 of R-gem cis on 12/26/2019 with negative PET imaging on 1/30/2020.  Initiation of revlimid maintenance February 2020. discontinued due to poor tolerance  10/2020 Dx with recurrent lyphoma left chest wall  12/1/2020 Started Tafasetinib (Monjuvi) and Revlimid salvage therapy for DLBCL relapse acheived a complete clinical remission.    She completed treatment with Revlimid 5 mg days 1-21/28 days on 11/4/21.  Remains on Tafasitamab (Monjuvi) on days 1 Last dose of Monjuvi after PET imaging 5/8/23 was clear except for questionable  area right cervical level IVB lymph node with SUV of 5.1 due to patient's preference     Diffuse large B-cell lymphoma of lymph nodes of multiple regions (CMS/HCC)    10/30/2023 Initial Diagnosis    Diffuse large B-cell lymphoma of lymph nodes of multiple regions (CMS/HCC)         Response:     Past Medical History:  1. History of hep C infection diagnosed 2008, not currently on therapy.   2. Diverticulitis May 2012 an admission for diverticulitis in July 2013.   3. Gastroesophageal reflux disease, irritable bowel syndrome.   4. atrial fibrillation, in 2013, and in the setting of hyperthyroidism.   5.  Right breast biopsy 3/22/2012 for abnormal calcifications- benign  6. Chronic hyponatremia    Surgical History:  Lumbar spince surgery January 2017     Family History:   No family history on file.    Social History:     Social History     Tobacco Use    Smoking status: Never     Passive exposure: Never    Smokeless tobacco: Never      -------------------------------------------------------------------------------------------------------  Subjective       HPI    Simi Sandoval is a 88 year old woman who had stage IA diffuse large B-cell lymphoma involving the left breast, initially diagnosed in 2007 with multiple recurrences, who initiated  (12/1/20) Tafasitinib (Monjuvi) and Revlimid salvage therapy for DLBCL relapse acheiving a complete clinical remission and is on tafasitinib maintenance every 2 weeks.      Most recent CT Staging for disease assessment (2/2/22) with no noncontrast CT evidence of intrathoracic or adb/pelvic lymphoproliferative process, usual fluid filled loops of bowel. Previously seen 3 mm left lower lobe pulmonary nodule is not conspicuous  on current imaging.     She completed treatment with Revlimid 5 mg days 1-21/28 days on 11/4/21.  Last dose of Monjuvi after PET imaging 5/8/23 was clear except for questionable  area right cervical level IVB lymph node with SUV of 5.1.  CT scan (8/18/23) SARAI.  Patient currently on a treatment holiday.    Simi Sandoval is a 88 year old patient who presents to the clinic today (3/5/24) for evaluation of her DLBCL in  remission, was most recently treated with tafasitinib May 2023 and has been on observation.  CT imaging done before her visit today on 3/1/24  show no evidence of disease.   Patient reports she was admitted to [facility] early February 2024 with pneumonia and a bowel impaction and low magnesium and was discharged to home. She still has a residual cough.  States she still has a poor appetite.  Still with baseline neuropathy.    States she is moving her bowels okay, although not on any stool softeners..     Review of Systems   Constitutional:  Positive for appetite change and fatigue. Negative for chills, diaphoresis, fever and unexpected weight change.   Respiratory:  Negative for cough, shortness of breath and wheezing.    Cardiovascular:  Negative for chest pain, leg swelling (mild swelling to bilateral legs) and palpitations.   Gastrointestinal:  Negative for blood in stool, constipation, diarrhea, nausea and vomiting.   Genitourinary:  Negative for dysuria and hematuria.    Musculoskeletal:  Negative for gait problem.   Skin:  Negative for rash and wound.   Neurological:  Positive for light-headedness (when turns head too quickly) and numbness (left arm and hand). Negative for dizziness and gait problem.   Hematological:  Negative for adenopathy.      -------------------------------------------------------------------------------------------------------  Objective   BSA: There is no height or weight on file to calculate BSA.  There were no vitals taken for this visit.    Physical Exam  Vitals reviewed.   Constitutional:       Appearance: Normal appearance.      Comments: Looks pale in no distress   HENT:      Head: Normocephalic and atraumatic.      Nose: Nose normal.      Mouth/Throat:      Mouth: Mucous membranes are moist.   Eyes:      Extraocular Movements: Extraocular movements intact.      Conjunctiva/sclera: Conjunctivae normal.      Pupils: Pupils are equal, round, and reactive to light.    Cardiovascular:      Rate and Rhythm: Normal rate and regular rhythm.      Pulses: Normal pulses.      Heart sounds: Normal heart sounds. No murmur heard.     No friction rub. No gallop.      Comments: Normal rate, irregular   Pulmonary:      Effort: Pulmonary effort is normal. No respiratory distress.      Breath sounds: Normal breath sounds. No wheezing.   Abdominal:      General: Abdomen is flat. Bowel sounds are normal. There is no distension.      Palpations: Abdomen is soft. There is no mass.      Tenderness: There is no abdominal tenderness.   Musculoskeletal:         General: Normal range of motion.      Right lower leg: No edema (non-pitting).      Left lower leg: No edema (non-pitting).   Lymphadenopathy:      Comments: No lymphadenopathy   Skin:     General: Skin is warm and dry.      Comments: Possible lipoma to left shoulder, soft/rubbery, non-tender.   Neurological:      General: No focal deficit present.      Mental Status: She is alert and oriented to person, place, and time. Mental status is at baseline.   Psychiatric:         Mood and Affect: Mood normal.         Behavior: Behavior normal.         Thought Content: Thought content normal.       Performance Status:  ECOG Performance Status: 1 restricted from physically strenuous work  -------------------------------------------------------------------------------------------------------  Assessment/Plan      DLBCL Third recurrence October 2020 extensive recurrence in left pleural as well as other sites.        Initiated Tafasitamab (Monjuvi) 12 g/kg IV  in combination with Revlimid on 12/1/20.   Monjuvi will be given on day 1,4,8,15, 22 of 28 day cycle for first cycle, then weekly (days 1,8,15, & 22) for cycles 2,3, then cycles 4 and beyond on days 1 & 15/28  day cycles.  Acheived CR     PET imaging (5/8/23) - interval complete resolution of hypermetabolic activity involving a peripancreatic lymph node.  Additionally, the previously described  periaortic lymph node has also demonstrated complete interval resolution of hypermetabolic activity that was present in previous imaging on 2/1/23 but now describes a new right level IVB lymph node demonstrating  intense hypermetabolic activity with maximum SUV of 5.1      Completed 33 cycles of Tafasitamab (Monjuvi) scheduled on May 16 2023 and requested treatment holiday.   Disease surveillance 3/1/24 SARAI!    3/1/24 Patient is status quo with no evidence of recurrent disease.      In the future bite molecules would be an option.         Pain management -  currently under good control on Lyrica and restless leg med. No longer seeing pall med, currently using acetaminophen, hydrocodone-acetaminophen, pregabalin, and lidocaine patches     3. History of hepatitis C. The patient has been followed by Dr. Boby Hale in  past and now followed by her PCP Dr. Coulter at [facility], unclear what tests have been done. LFTs have been normal.     4. Atrial fibrillation and hypertension:  Patent has had regular followup with cardiologist  at [facility] ( Dr. Paul Harris). Continues Metoprolol with rate control.  Also on Eliquis 5mg BID  & Amlodipine.      5. Mild memory loss-  seems to be functioning pretty well  not clear whether she has seen geriatrics.     6. Health maintenance- Prevnar 13 vaccine updated  10/29/16, has had flu shot 2019.  Mammogram completed 6/2019. Has completed COVID vaccines  and booster and Evusheld April 2022.  1/2/24:  Advised patient to receive flu, covid, RSV vaccines at any local pharmacy.     7. Psychosocial- anxiety and depression as above.  -uses prn lorazepam for anxiety     8.  Goals of Care: last confirmed 10/31/23 that the patient does not want to be resuscitated or be placed on life support systems.  She would want  afib addressed. Patient informed me that although she has been  from her  for years, they are still . Strongly encouraged patient to complete health care POA to  determine who would make medica decisions Debbie RIVERAW in to consult     Central Line:  Mediport to right chest, site with no erythema, tenderness, edema, or drainage     Hypomagnesia: continues on oral magnesium  RTC:  Infusion for port flush in monthly   Follow up visit with Dr. Mac in 3 months with blood draw. .  -------------------------------------------------------------------------------------------------------  Stella Mac MD

## 2024-04-02 ENCOUNTER — SPECIALTY PHARMACY (OUTPATIENT)
Dept: PHARMACY | Facility: CLINIC | Age: 89
End: 2024-04-02

## 2024-04-03 ENCOUNTER — PHARMACY VISIT (OUTPATIENT)
Dept: PHARMACY | Facility: CLINIC | Age: 89
End: 2024-04-03
Payer: COMMERCIAL

## 2024-04-03 PROCEDURE — RXMED WILLOW AMBULATORY MEDICATION CHARGE

## 2024-04-04 ENCOUNTER — INFUSION (OUTPATIENT)
Dept: HEMATOLOGY/ONCOLOGY | Facility: CLINIC | Age: 89
End: 2024-04-04
Payer: MEDICARE

## 2024-04-04 VITALS
WEIGHT: 150 LBS | OXYGEN SATURATION: 95 % | TEMPERATURE: 95.7 F | HEART RATE: 65 BPM | DIASTOLIC BLOOD PRESSURE: 64 MMHG | BODY MASS INDEX: 26.25 KG/M2 | SYSTOLIC BLOOD PRESSURE: 127 MMHG | RESPIRATION RATE: 18 BRPM

## 2024-04-04 DIAGNOSIS — C83.38 DIFFUSE LARGE B-CELL LYMPHOMA OF LYMPH NODES OF MULTIPLE REGIONS (MULTI): ICD-10-CM

## 2024-04-04 PROCEDURE — 2500000004 HC RX 250 GENERAL PHARMACY W/ HCPCS (ALT 636 FOR OP/ED): Performed by: INTERNAL MEDICINE

## 2024-04-04 PROCEDURE — 96523 IRRIG DRUG DELIVERY DEVICE: CPT

## 2024-04-04 RX ORDER — HEPARIN SODIUM,PORCINE/PF 10 UNIT/ML
50 SYRINGE (ML) INTRAVENOUS AS NEEDED
Status: CANCELLED | OUTPATIENT
Start: 2024-04-04

## 2024-04-04 RX ORDER — HEPARIN 100 UNIT/ML
500 SYRINGE INTRAVENOUS AS NEEDED
Status: DISCONTINUED | OUTPATIENT
Start: 2024-04-04 | End: 2024-04-04 | Stop reason: HOSPADM

## 2024-04-04 RX ORDER — HEPARIN SODIUM,PORCINE/PF 10 UNIT/ML
50 SYRINGE (ML) INTRAVENOUS AS NEEDED
Status: DISCONTINUED | OUTPATIENT
Start: 2024-04-04 | End: 2024-04-04 | Stop reason: HOSPADM

## 2024-04-04 RX ORDER — HEPARIN 100 UNIT/ML
500 SYRINGE INTRAVENOUS AS NEEDED
Status: CANCELLED | OUTPATIENT
Start: 2024-04-04

## 2024-04-04 RX ADMIN — HEPARIN 500 UNITS: 100 SYRINGE at 14:39

## 2024-04-04 ASSESSMENT — PAIN SCALES - GENERAL: PAINLEVEL: 0-NO PAIN

## 2024-04-25 ENCOUNTER — NURSE TRIAGE (OUTPATIENT)
Dept: HEMATOLOGY/ONCOLOGY | Facility: HOSPITAL | Age: 89
End: 2024-04-25
Payer: MEDICARE

## 2024-04-25 DIAGNOSIS — F41.9 ANXIETY: ICD-10-CM

## 2024-04-25 DIAGNOSIS — C83.38 DIFFUSE LARGE B-CELL LYMPHOMA OF LYMPH NODES OF MULTIPLE REGIONS (MULTI): Primary | ICD-10-CM

## 2024-04-25 NOTE — TELEPHONE ENCOUNTER
Pt called with concern that neuropathy is worsening.  She reports intermittent burning pain in her wrists L>R, intermittent numbness in her R hand and intermittent muscle cramps all over.  She notes that frequency and intensity are increasing.    She is still able to complete ADL's independently but notes that tasks such as writing and buttoning clothing are becoming more difficult.  She occasionally drops small objects.  She continues to take lyrica BID and uses tylenol, Norco and CBC oil prn with improvement reported.  She specifically asked about adding gabapentin?    Additional Information   Commented on: If yes to pain, on a scale of 0 to 10 (0 being no pain and 10 being the worst possible) how would you rate your pain?     Pain in left wrist is sometimes 10/10   Commented on: Where is the pain? Is there more than one place where you're having pain?     Wrist L>R, right hand, cramps in extremities   Commented on: What helps these problems?     Uses tylenol and Norco prn.  Takes lyrica BID  Applies CBC cream to affected areas   Commented on: What else do you want to tell me about this problem?     No fevers, chest pain or difficulty breathing    Protocols used: Paresthesia/Peripheral Neuropathy

## 2024-04-27 RX ORDER — LORAZEPAM 0.5 MG/1
0.5 TABLET ORAL DAILY PRN
Qty: 30 TABLET | Refills: 0 | Status: SHIPPED | OUTPATIENT
Start: 2024-04-27 | End: 2024-06-07

## 2024-04-27 NOTE — PROGRESS NOTES
Called Simi and discussed her concerns regarding worsening neuropathy pain.  Informed Simi that Dr. Coulter prescribes her lyrica and to try to reach out to that provider to see if she can have a dose adjustments to this medication.  Simi also reports that her mood has been lower and has had increased fatigue.  She states that she tries to stay active and walks, goes to the mall, or other activities.  Recommended that she see supportive oncology for management for further evaluation of multiple symptom concerns.  Provided number to schedule supportive oncology appointment.  She requested a refill for her prn lorazepam.  Advised the patient that this medication can cause drowsiness and to use with caution.  She reports that lorazapam helps her mood greatly and she has been tolerating it well.     Instructed Simi to contact us with worsening symptoms or other concerns.  Follow up visit scheduled for  6/11/24.

## 2024-04-29 NOTE — TELEPHONE ENCOUNTER
Bertha Grewal CNP spoke to pt and advised her to contact Dr. Coulter for adjustments to lyrica.  Order placed for referral to supportive oncology for worsening pain, increased fatigue and worsening mood.

## 2024-05-01 ENCOUNTER — OFFICE VISIT (OUTPATIENT)
Dept: PALLIATIVE MEDICINE | Facility: CLINIC | Age: 89
End: 2024-05-01
Payer: MEDICARE

## 2024-05-01 VITALS
TEMPERATURE: 98.2 F | RESPIRATION RATE: 18 BRPM | SYSTOLIC BLOOD PRESSURE: 151 MMHG | DIASTOLIC BLOOD PRESSURE: 70 MMHG | HEART RATE: 64 BPM | OXYGEN SATURATION: 96 %

## 2024-05-01 DIAGNOSIS — K59.03 CONSTIPATION DUE TO PAIN MEDICATION THERAPY: ICD-10-CM

## 2024-05-01 DIAGNOSIS — M54.50 CHRONIC LOW BACK PAIN WITHOUT SCIATICA, UNSPECIFIED BACK PAIN LATERALITY: ICD-10-CM

## 2024-05-01 DIAGNOSIS — Z51.5 ENCOUNTER FOR PALLIATIVE CARE: Primary | ICD-10-CM

## 2024-05-01 DIAGNOSIS — G62.0 CHEMOTHERAPY-INDUCED NEUROPATHY (MULTI): ICD-10-CM

## 2024-05-01 DIAGNOSIS — C83.38 DIFFUSE LARGE B-CELL LYMPHOMA OF LYMPH NODES OF MULTIPLE REGIONS (MULTI): ICD-10-CM

## 2024-05-01 DIAGNOSIS — G89.29 CHRONIC LOW BACK PAIN WITHOUT SCIATICA, UNSPECIFIED BACK PAIN LATERALITY: ICD-10-CM

## 2024-05-01 DIAGNOSIS — T45.1X5A CHEMOTHERAPY-INDUCED NEUROPATHY (MULTI): ICD-10-CM

## 2024-05-01 PROCEDURE — 1160F RVW MEDS BY RX/DR IN RCRD: CPT | Performed by: NURSE PRACTITIONER

## 2024-05-01 PROCEDURE — 99214 OFFICE O/P EST MOD 30 MIN: CPT | Performed by: NURSE PRACTITIONER

## 2024-05-01 PROCEDURE — 1126F AMNT PAIN NOTED NONE PRSNT: CPT | Performed by: NURSE PRACTITIONER

## 2024-05-01 PROCEDURE — 3078F DIAST BP <80 MM HG: CPT | Performed by: NURSE PRACTITIONER

## 2024-05-01 PROCEDURE — 1159F MED LIST DOCD IN RCRD: CPT | Performed by: NURSE PRACTITIONER

## 2024-05-01 PROCEDURE — 1157F ADVNC CARE PLAN IN RCRD: CPT | Performed by: NURSE PRACTITIONER

## 2024-05-01 PROCEDURE — 3077F SYST BP >= 140 MM HG: CPT | Performed by: NURSE PRACTITIONER

## 2024-05-01 RX ORDER — DULOXETIN HYDROCHLORIDE 20 MG/1
20 CAPSULE, DELAYED RELEASE ORAL DAILY
Qty: 30 CAPSULE | Refills: 11 | Status: SHIPPED | OUTPATIENT
Start: 2024-05-01 | End: 2025-04-26

## 2024-05-01 ASSESSMENT — PAIN SCALES - GENERAL: PAINLEVEL: 0-NO PAIN

## 2024-05-01 ASSESSMENT — ENCOUNTER SYMPTOMS
LOSS OF SENSATION IN FEET: 0
DEPRESSION: 0
OCCASIONAL FEELINGS OF UNSTEADINESS: 1

## 2024-05-01 NOTE — PROGRESS NOTES
SUPPORTIVE AND PALLIATIVE ONCOLOGY CONSULT - OUTPATIENT      SERVICE DATE: 5/1/2024    Referred by:  Bertha Grewal, APRN-CNP   Medical Oncologist: Stella Mac MD   Radiation Oncologist: No care team member to display  Primary Physician: Azar Coulter  [phone]    REASON FOR CONSULT/CHIEF CONSULT COMPLAINT: Symptom Assessment     Subjective   HISTORY OF PRESENT ILLNESS: Simi Sandoval is a 88 y.o. 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 Now with SARAI. Reports las treatment was in January.     Additional PMHx Hep C dx 2008, diverticulitis, GERD, IBS, a fib in 2013 in the setting of hyperthyroidism, HTN, DJD, spinal stenosis, disc herniation (hx back surgery 20 years ago)    Denies documented hx of RLS    Pain Assessment:    Patient presents with 3 acute pain episodes over the last few months:    Full body pain. Woke up from sleep. Sharp. Lasted  less than one minute. Then gone and was able to go back to sleep. **This was difficult for the patient to describe.     Bilateral arms (elbows to finger tips). Sharp but not sharp as first pain. Woke up from sleep. Lasted a few seconds then resolved and she went back to sleep.     3.   Acute on chronic severe episode of lower back pain this morning. Woke her up from sleep. Sharp. Lasted a few seconds. Non radiating. Takes rare norco or APAP PRN. Worse if standing too long.     She also reports chronic numbness and tingling in bilateral feet along with bilateral hands R>L that is chemotherapy induced.     Symptom Assessment:    Pain: See above   Headache: none  Dizziness:none  Lack of energy: a little  Difficulty sleeping: none - uses melatonin   Anxiety: a little - ativan PRN - Rx from PCP  Depression: a little  Pain in mouth/swallowing: none  Dry mouth: none  Taste changes: a little  Shortness of breath: none  Lack of appetite: somewhat   Nausea: none  Vomiting: none  Constipation: none   Diarrhea: none  Sore  muscles: none  Numbness or tingling in hands/feet/other: somewhat  Weight loss: none  Other: hypersalivation at night       Information obtained from: interview of patient  ______________________________________________________________________     Oncology History Overview Note   1. 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 CT imaging was negative for cycle 2 of chemotherapy. The patient subsequently received treatment with 30 Gy radiation to the left breast completing August 2008.    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  Bulky recurrence documented 8/30/19 after presentation with hyponatremia. Bronchoscopy at [facility] on 8/30/19  showed diffuse large B cell lymphoma non germinal center cell with double expression of bcl2 and cmyc, but not double hit.   PET scan from [facility] system done 9/9/19 ordered by Dr. Ulices Leon with findings: lobulated mass in RUL 3.6 x 2.4 cm with SUV 36.8, hilar lymphadenopathy on right  with SUV 32.6, also uptake pleura and intercostal musculature. Abdominal findings include  several abnormal foci in liver , portacaval area with SUV of 4.5, uptake in T1 suspicious.   Initiation of R-gem cis 9/17/19, Completed cycle 5 of R-gem cis on 12/26/2019 with negative PET imaging on 1/30/2020.  Initiation of revlimid maintenance February 2020. discontinued due to poor tolerance  10/2020 Dx with recurrent lyphoma left chest wall  12/1/2020 Started Tafasetinib (Monjuvi) and Revlimid salvage therapy for DLBCL relapse acheived a complete clinical remission.    She completed treatment with Revlimid 5 mg days 1-21/28 days on 11/4/21.  Remains on Tafasitamab (Monjuvi) on days 1 Last dose of Monjuvi after PET imaging 5/8/23 was clear except for  questionable  area right cervical level IVB lymph node with SUV of 5.1 due to patient's preference     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)   10/30/2023 Initial Diagnosis    Diffuse large B-cell lymphoma of lymph nodes of multiple regions (CMS/HCC)         No past medical history on file.  No past surgical history on file.  No family history on file.     SOCIAL HISTORY  Social History:  reports that she has never smoked. She has never been exposed to tobacco smoke. She has never used smokeless tobacco.        REVIEW OF SYSTEMS  Review of systems negative unless noted in HPI.       Objective     Current Outpatient Medications   Medication Instructions   • acetaminophen (Tylenol) 325 mg tablet 2 tablets, oral, Every 4 hours PRN   • amLODIPine (Norvasc) 5 mg tablet 1 tablet, oral, Daily   • apixaban (Eliquis) 5 mg tablet TAKE ONE (1) TABLET BY MOUTH 2 TIMES A DAY (EVERY 12 HOURS)   • blood pressure monitor (Blood Pressure Kit) kit For Home BP monitoring once daily (Dx I 10)   • cholecalciferol (Vitamin D-3) 25 MCG (1000 UT) tablet 1 tablet, oral, Daily   • famotidine (PEPCID) 20 mg, oral, Nightly   • HYDROcodone-acetaminophen (Norco)  mg tablet 1 tablet, oral, Every 6 hours PRN   • lidocaine (Lidoderm) 5 % patch Apply topically to affected area once a day. Keep on for 12 hours and remove for 12 hours before applying next patch.   • loperamide (Imodium A-D) 2 mg capsule oral   • LORazepam (ATIVAN) 0.5 mg, oral, Daily PRN   • magnesium chloride 71.5 mg, oral, Daily   • melatonin 3 mg tablet 1 tablet, oral, Nightly PRN   • methIMAzole (Tapazole) 5 mg tablet oral   • methylPREDNISolone (Medrol Dospak) 4 mg tablets oral   • metoprolol succinate XL (Toprol-XL) 100 mg 24 hr tablet    • pregabalin (LYRICA) 25 mg, oral, 2 times daily   • pyridoxine (Vitamin B-6) 25 mg tablet 1 tablet, oral, Daily   • rOPINIRole (REQUIP) 0.5 mg, oral, Nightly   • sodium chloride 1 g, oral, 2 times daily    • spironolactone (ALDACTONE) 25 mg, oral, Daily   • tafasitamab-cxix (MONJUVI IV) 200 mg, intravenous, Every 14 days       Allergies:   Allergies   Allergen Reactions   • Ciprofloxacin Unknown   • Diltiazem Hcl Unknown   • Iodinated Contrast Media Unknown   • Metronidazole Unknown   • Nizatidine Unknown   • Penicillins Unknown   • Sertraline Unknown   • Sulfamethoxazole Unknown   • Venlafaxine Unknown           CBC/CMP 3/1/24     PHYSICAL EXAMINATION  Vital Signs:   Vital signs reviewed        12/21/2023    12:30 PM 12/21/2023    12:45 PM 1/2/2024     1:38 PM 3/1/2024    11:35 AM 3/5/2024    12:31 PM 4/4/2024     2:33 PM 5/1/2024    11:23 AM   Vitals   Systolic 124 129 133 136 132 127 151   Diastolic 61 58 61 75 58 64 70   Heart Rate  72 69 75 65 65 64   Temp   36.5 °C (97.7 °F) 36.3 °C (97.3 °F) 36 °C (96.8 °F) 35.4 °C (95.7 °F) 36.8 °C (98.2 °F)   Resp 15 16  18 18 18 18   Weight (lb)   142.1  143 150    BMI   24.87 kg/m2  25.02 kg/m2 26.25 kg/m2    BSA (m2)   1.7 m2  1.7 m2 1.74 m2    Visit Report   Report  Report  Report        Physical Exam  HENT:      Head: Normocephalic and atraumatic.      Comments: No LAD     Mouth/Throat:      Mouth: Mucous membranes are dry.      Pharynx: Oropharynx is clear.   Eyes:      Extraocular Movements: Extraocular movements intact.      Conjunctiva/sclera: Conjunctivae normal.   Pulmonary:      Effort: Pulmonary effort is normal.   Abdominal:      General: Abdomen is flat.   Musculoskeletal:         General: Normal range of motion.      Comments: Spine non tender to palpation   Midline lumbar scar noted    Skin:     General: Skin is warm and dry.   Neurological:      General: No focal deficit present.      Mental Status: She is alert.   Psychiatric:         Mood and Affect: Mood normal.         Thought Content: Thought content normal.       ASSESSMENT/PLAN    Pain  Pain is: Acute on chronic back pain / CIPN  Type: somatic and neuropathic  -norco and lyrica per PCP   -start  duloxetine 20mg daily   -pain mgmt referral per patient request     Constipation, at risk OIC  -patient reports controlled by using probiotic    Altered Mood  Chronic anxiety and depression related to health concerns   -duloxetine per above   -ativan per pcp     Sleeping Difficulty:  -continue melatonin at bedtime     Decreased appetite  No weight loss  -small, frequent, high protein meals     Hypersalivation   -FU PCP    Introduction to Supportive and Palliative Oncology:  Spoke with patient    Introduced the role and philosophy of Supportive and Palliative oncology in the evaluation and management of symptoms during cancer treatment  Palliative care was introduced as a service for patients with serious illness to help with symptoms, assist with goals of care conversations, navigate complex decision making, improve quality of life for patients, and provide support both patients and families.  Patient seemed to appreciate the extra layer of support.    Medical Decision Making/Goals of Care/Advance Care Planning:  Patient's current clinical condition, including diagnosis, prognosis, and management plan, and goals of care were discussed.   Goals: Fu for surveillance      Advance Directives  Deferred until next visit     Next Follow-Up Visit:  Return to clinic in 4 weeks VV    Signature and billing  Thank you for allowing us to participate in the care of this patient. Recommendations will be communicated back to the consulting service by way of shared electronic medical record or face-to-face.    Medical complexity was moderate level due to due to complexity of problems, extensive data review, and high risk of management/treatment.  Time was spent on the following: Prep Time, Time Directly with Patient/Family/Caregiver, Documentation Time. Total time spent: 45      DATA   Diagnostic tests and information reviewed for today's visit:  Most recent labs and imaging results, Medications       Plan of Care discussed with:  Patient and RN      SIGNATURE: Vijaya Lucero, APRN-CNP    Contact information:  Supportive and Palliative Oncology  Monday-Friday 8 AM-5 PM  Phone:  [phone], press option #5, then option #1.   Or Epic Secure Chat

## 2024-05-02 ENCOUNTER — INFUSION (OUTPATIENT)
Dept: HEMATOLOGY/ONCOLOGY | Facility: CLINIC | Age: 89
End: 2024-05-02
Payer: MEDICARE

## 2024-05-02 ENCOUNTER — SPECIALTY PHARMACY (OUTPATIENT)
Dept: PHARMACY | Facility: CLINIC | Age: 89
End: 2024-05-02

## 2024-05-02 VITALS
RESPIRATION RATE: 18 BRPM | DIASTOLIC BLOOD PRESSURE: 78 MMHG | TEMPERATURE: 98.2 F | HEART RATE: 62 BPM | OXYGEN SATURATION: 95 % | SYSTOLIC BLOOD PRESSURE: 171 MMHG

## 2024-05-02 DIAGNOSIS — C83.30 DIFFUSE LARGE B-CELL LYMPHOMA, UNSPECIFIED SITE (MULTI): ICD-10-CM

## 2024-05-02 DIAGNOSIS — C83.38 DIFFUSE LARGE B-CELL LYMPHOMA OF LYMPH NODES OF MULTIPLE REGIONS (MULTI): ICD-10-CM

## 2024-05-02 LAB
ALBUMIN SERPL BCP-MCNC: 4.2 G/DL (ref 3.4–5)
ALP SERPL-CCNC: 75 U/L (ref 33–136)
ALT SERPL W P-5'-P-CCNC: 23 U/L (ref 7–45)
ANION GAP SERPL CALC-SCNC: 16 MMOL/L (ref 10–20)
AST SERPL W P-5'-P-CCNC: 25 U/L (ref 9–39)
BASOPHILS # BLD AUTO: 0.05 X10*3/UL (ref 0–0.1)
BASOPHILS NFR BLD AUTO: 0.5 %
BILIRUB SERPL-MCNC: 0.6 MG/DL (ref 0–1.2)
BUN SERPL-MCNC: 10 MG/DL (ref 6–23)
CALCIUM SERPL-MCNC: 10.1 MG/DL (ref 8.6–10.6)
CHLORIDE SERPL-SCNC: 99 MMOL/L (ref 98–107)
CO2 SERPL-SCNC: 22 MMOL/L (ref 21–32)
CREAT SERPL-MCNC: 0.83 MG/DL (ref 0.5–1.05)
EGFRCR SERPLBLD CKD-EPI 2021: 69 ML/MIN/1.73M*2
EOSINOPHIL # BLD AUTO: 0.08 X10*3/UL (ref 0–0.4)
EOSINOPHIL NFR BLD AUTO: 0.9 %
ERYTHROCYTE [DISTWIDTH] IN BLOOD BY AUTOMATED COUNT: 12.5 % (ref 11.5–14.5)
GLUCOSE SERPL-MCNC: 110 MG/DL (ref 74–99)
HCT VFR BLD AUTO: 42.4 % (ref 36–46)
HGB BLD-MCNC: 14.4 G/DL (ref 12–16)
IMM GRANULOCYTES # BLD AUTO: 0.01 X10*3/UL (ref 0–0.5)
IMM GRANULOCYTES NFR BLD AUTO: 0.1 % (ref 0–0.9)
LDH SERPL L TO P-CCNC: 184 U/L (ref 84–246)
LYMPHOCYTES # BLD AUTO: 1.2 X10*3/UL (ref 0.8–3)
LYMPHOCYTES NFR BLD AUTO: 12.9 %
MAGNESIUM SERPL-MCNC: 1.45 MG/DL (ref 1.6–2.4)
MCH RBC QN AUTO: 30.4 PG (ref 26–34)
MCHC RBC AUTO-ENTMCNC: 34 G/DL (ref 32–36)
MCV RBC AUTO: 90 FL (ref 80–100)
MONOCYTES # BLD AUTO: 1.13 X10*3/UL (ref 0.05–0.8)
MONOCYTES NFR BLD AUTO: 12.2 %
NEUTROPHILS # BLD AUTO: 6.81 X10*3/UL (ref 1.6–5.5)
NEUTROPHILS NFR BLD AUTO: 73.4 %
NRBC BLD-RTO: ABNORMAL /100{WBCS}
PLATELET # BLD AUTO: 254 X10*3/UL (ref 150–450)
POTASSIUM SERPL-SCNC: 4.3 MMOL/L (ref 3.5–5.3)
PROT SERPL-MCNC: 6.4 G/DL (ref 6.4–8.2)
RBC # BLD AUTO: 4.74 X10*6/UL (ref 4–5.2)
SODIUM SERPL-SCNC: 133 MMOL/L (ref 136–145)
WBC # BLD AUTO: 9.3 X10*3/UL (ref 4.4–11.3)

## 2024-05-02 PROCEDURE — 85025 COMPLETE CBC W/AUTO DIFF WBC: CPT | Performed by: INTERNAL MEDICINE

## 2024-05-02 PROCEDURE — 36591 DRAW BLOOD OFF VENOUS DEVICE: CPT

## 2024-05-02 PROCEDURE — RXMED WILLOW AMBULATORY MEDICATION CHARGE

## 2024-05-02 PROCEDURE — 2500000004 HC RX 250 GENERAL PHARMACY W/ HCPCS (ALT 636 FOR OP/ED): Performed by: INTERNAL MEDICINE

## 2024-05-02 PROCEDURE — 83735 ASSAY OF MAGNESIUM: CPT | Performed by: INTERNAL MEDICINE

## 2024-05-02 PROCEDURE — 80053 COMPREHEN METABOLIC PANEL: CPT | Performed by: INTERNAL MEDICINE

## 2024-05-02 PROCEDURE — 83615 LACTATE (LD) (LDH) ENZYME: CPT | Performed by: INTERNAL MEDICINE

## 2024-05-02 RX ORDER — HEPARIN SODIUM,PORCINE/PF 10 UNIT/ML
50 SYRINGE (ML) INTRAVENOUS AS NEEDED
Status: CANCELLED | OUTPATIENT
Start: 2024-05-02

## 2024-05-02 RX ORDER — HEPARIN 100 UNIT/ML
500 SYRINGE INTRAVENOUS AS NEEDED
Status: DISCONTINUED | OUTPATIENT
Start: 2024-05-02 | End: 2024-05-02 | Stop reason: HOSPADM

## 2024-05-02 RX ORDER — HEPARIN 100 UNIT/ML
500 SYRINGE INTRAVENOUS AS NEEDED
Status: CANCELLED | OUTPATIENT
Start: 2024-05-02

## 2024-05-02 RX ADMIN — HEPARIN 500 UNITS: 100 SYRINGE at 14:23

## 2024-05-02 ASSESSMENT — PAIN SCALES - GENERAL: PAINLEVEL: 0-NO PAIN

## 2024-05-02 NOTE — PROGRESS NOTES
Pt here for mediport flush and lab draw. Tolerated procedure well. Pt had several questions regarding her medication list, particularly about her magnesium dose she should be taking as she has two different forms/doses listed. This RN reached out to Bertha Grewal NP to please contact patient to help clarify some of her medications if possible. Pt verbalized understanding, discharged in stable condition. Has schedule for follow up.

## 2024-05-03 ENCOUNTER — PHARMACY VISIT (OUTPATIENT)
Dept: PHARMACY | Facility: CLINIC | Age: 89
End: 2024-05-03
Payer: COMMERCIAL

## 2024-05-03 DIAGNOSIS — E83.42 HYPOMAGNESEMIA: ICD-10-CM

## 2024-05-04 NOTE — PROGRESS NOTES
5/2/24: Patient requested that her nurse reach out to oncology providers regarding reports of been having very brief, but extreme pain, describes as generalized sharp pain, cannot pinpoint an exact location, happens mostly at night, but sometimes in the morning. It wakes her from sleep, does not happen every day, but has been happening on and off for the last six weeks.  She also had questions regarding her magnesium.  5/3/24:  Called Simi and she explained again about having extreme but brief (only lasting a few seconds) of generalized pain.  She was unable to described the pain to me.  She has had this pain on every few weeks for the last 6 weeks.  Advised to track when these painful episodes are occurring and if they continue to reach out to her PCP.  If the pain does not go away or has other concerning symptoms she she should be seen in the ED.    Answered all medication related questions.  Sent a prescription for magnesium chloride to pharmacy.  Follow up visit on 6/11/24 with this provider.  Understanding verbalized by patient.      Bertha Grewal, APRN-CNP

## 2024-05-08 ENCOUNTER — TELEPHONE (OUTPATIENT)
Dept: PALLIATIVE MEDICINE | Facility: HOSPITAL | Age: 89
End: 2024-05-08
Payer: MEDICARE

## 2024-05-08 NOTE — TELEPHONE ENCOUNTER
Patient started Duloxetine, call to patient to see how she was doing. Message left encouraging a return call    Patient returned the call and stated she just started the Duloxetine today. I let her know I will follow up with her on Monday 5/12/24. Acknowledged. She asked when her next appointment was and I informed her of date and time and sent email confirmation.    Called 5/13/24 and left a message encouraging a return call

## 2024-05-29 ENCOUNTER — TELEMEDICINE (OUTPATIENT)
Dept: PALLIATIVE MEDICINE | Facility: CLINIC | Age: 89
End: 2024-05-29
Payer: MEDICARE

## 2024-05-29 DIAGNOSIS — G89.29 CHRONIC BACK PAIN, UNSPECIFIED BACK LOCATION, UNSPECIFIED BACK PAIN LATERALITY: ICD-10-CM

## 2024-05-29 DIAGNOSIS — F32.A ANXIETY AND DEPRESSION: ICD-10-CM

## 2024-05-29 DIAGNOSIS — F41.9 ANXIETY AND DEPRESSION: ICD-10-CM

## 2024-05-29 DIAGNOSIS — G89.3 CANCER RELATED PAIN: Primary | ICD-10-CM

## 2024-05-29 DIAGNOSIS — M54.9 CHRONIC BACK PAIN, UNSPECIFIED BACK LOCATION, UNSPECIFIED BACK PAIN LATERALITY: ICD-10-CM

## 2024-05-29 DIAGNOSIS — Z51.5 PALLIATIVE CARE ENCOUNTER: ICD-10-CM

## 2024-05-29 PROCEDURE — 1157F ADVNC CARE PLAN IN RCRD: CPT | Performed by: NURSE PRACTITIONER

## 2024-05-29 PROCEDURE — 99214 OFFICE O/P EST MOD 30 MIN: CPT | Performed by: NURSE PRACTITIONER

## 2024-05-29 PROCEDURE — 1159F MED LIST DOCD IN RCRD: CPT | Performed by: NURSE PRACTITIONER

## 2024-05-29 PROCEDURE — 1160F RVW MEDS BY RX/DR IN RCRD: CPT | Performed by: NURSE PRACTITIONER

## 2024-05-29 NOTE — PROGRESS NOTES
SUPPORTIVE AND PALLIATIVE ONCOLOGY CONSULT - OUTPATIENT FOLLOW UP    Virtual or Telephone Consent     An interactive audio and video telecommunication system which permits real time communications between the patient (at the originating site) and provider (at the distant site) was utilized to provide this telehealth service.   Verbal consent was requested and obtained from Simi Sandoval on this date, 5/29/24 for a telehealth visit.      SERVICE DATE: 5/29/2024    Referred by:  CORNELIUS Fields-CNP   Medical Oncologist: Stella Mac MD   Radiation Oncologist: No care team member to display  Primary Physician: Azar Coulter  [phone]    REASON FOR CONSULT/CHIEF CONSULT COMPLAINT: Symptom Assessment     Subjective   HISTORY OF PRESENT ILLNESS: Simi Sandoval is a 88 y.o. 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 Now with SARAI. Reports last treatment was in January.     Additional PMHx Hep C dx 2008, diverticulitis, GERD, IBS, a fib in 2013 in the setting of hyperthyroidism, HTN, DJD, spinal stenosis, disc herniation (hx back surgery 20 years ago)    Denies documented hx of RLS    5/29/24: Hospitalized 5/9-5/15/24 at [facility] for urinary retention. She was evaluated by urology and treated for UTI. Had indwelling maki in place that was discontinued prior to discharging. No issues voiding. She continues to have mild to moderate, non radiating,  low back pain more noticeable with bowel movements and urination. CT lumbar spine with IV contrast on 5/13: Generalized atrophy and sequela of small vessel ischemic disease.  No acute intracranial process is identified. Status post posterior fusion and laminectomy at L3-S1.  Intact surgical hardware.  Mild anterolisthesis of L4 on L5.  No acute findings.      Pain Assessment:    Initially presented with the following on 5/1/24 and upon assessment today, none of the listed episodes below have recurred.      Full body pain. Woke up from sleep. Sharp. Lasted  less than one minute. Then gone and was able to go back to sleep. **This was difficult for the patient to describe.     Bilateral arms (elbows to finger tips). Sharp but not sharp as first pain. Woke up from sleep. Lasted a few seconds then resolved and she went back to sleep.     3.   Acute on chronic severe episode of lower back pain this morning. Woke her up from sleep. Sharp. Lasted a few seconds. Non radiating. Takes rare norco or APAP PRN. Worse if standing too long.     She also reports chronic numbness and tingling in bilateral feet along with bilateral hands R>L that is chemotherapy induced.     Symptom Assessment:    Pain: See above   Headache: none  Dizziness:none  Lack of energy: a little  Difficulty sleeping: none - uses melatonin   Anxiety: a little - ativan PRN - Rx from PCP  Depression: a little  Pain in mouth/swallowing: none  Dry mouth: none  Taste changes: a little  Shortness of breath: none  Lack of appetite: somewhat   Nausea: none  Vomiting: none  Constipation: none   Diarrhea: none  Sore muscles: none  Numbness or tingling in hands/feet/other: somewhat  Weight loss: none      Information obtained from: interview of patient  ______________________________________________________________________     Oncology History Overview Note   1. 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 CT imaging was negative for cycle 2 of chemotherapy. The patient subsequently received treatment with 30 Gy radiation to the left breast completing August 2008.    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  Bulky recurrence documented 8/30/19 after presentation with hyponatremia. Bronchoscopy at [facility] on 8/30/19   showed diffuse large B cell lymphoma non germinal center cell with double expression of bcl2 and cmyc, but not double hit.   PET scan from [employer] system done 9/9/19 ordered by Dr. Ulices Leon with findings: lobulated mass in RUL 3.6 x 2.4 cm with SUV 36.8, hilar lymphadenopathy on right  with SUV 32.6, also uptake pleura and intercostal musculature. Abdominal findings include  several abnormal foci in liver , portacaval area with SUV of 4.5, uptake in T1 suspicious.   Initiation of R-gem cis 9/17/19, Completed cycle 5 of R-gem cis on 12/26/2019 with negative PET imaging on 1/30/2020.  Initiation of revlimid maintenance February 2020. discontinued due to poor tolerance  10/2020 Dx with recurrent lyphoma left chest wall  12/1/2020 Started Tafasetinib (Monjuvi) and Revlimid salvage therapy for DLBCL relapse acheived a complete clinical remission.    She completed treatment with Revlimid 5 mg days 1-21/28 days on 11/4/21.  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  No past surgical history on file.  No family history on file.     SOCIAL HISTORY  Social History:  reports that she has never smoked. She has never been exposed to tobacco smoke. She has never used smokeless tobacco.        REVIEW OF SYSTEMS  Review of systems negative unless noted in HPI.       Objective     Current Outpatient Medications   Medication Instructions    acetaminophen (Tylenol) 325 mg tablet 2 tablets, oral, Every 4 hours PRN    amLODIPine (Norvasc) 5 mg tablet 1 tablet, oral, Daily    apixaban (Eliquis) 5 mg tablet TAKE ONE (1) TABLET BY MOUTH 2 TIMES A DAY  (EVERY 12 HOURS)    blood pressure monitor (Blood Pressure Kit) kit For Home BP monitoring once daily (Dx I 10)    cholecalciferol (Vitamin D-3) 25 MCG (1000 UT) tablet 1 tablet, oral, Daily    DULoxetine (CYMBALTA) 20 mg, oral, Daily, Do not crush or chew.    famotidine (PEPCID) 20 mg, oral, Nightly    HYDROcodone-acetaminophen (Norco)  mg tablet 1 tablet, oral, Every 6 hours PRN    lidocaine (Lidoderm) 5 % patch Apply topically to affected area once a day. Keep on for 12 hours and remove for 12 hours before applying next patch.    loperamide (Imodium A-D) 2 mg capsule oral    LORazepam (ATIVAN) 0.5 mg, oral, Daily PRN    magnesium chloride 71.5 mg, oral, Daily    melatonin 3 mg tablet 1 tablet, oral, Nightly PRN    methIMAzole (Tapazole) 5 mg tablet oral    methylPREDNISolone (Medrol Dospak) 4 mg tablets oral    metoprolol succinate XL (Toprol-XL) 100 mg 24 hr tablet     pregabalin (LYRICA) 25 mg, oral, 2 times daily    pyridoxine (Vitamin B-6) 25 mg tablet 1 tablet, oral, Daily    rOPINIRole (REQUIP) 0.5 mg, oral, Nightly    sodium chloride 1 g, oral, 2 times daily    spironolactone (ALDACTONE) 25 mg, oral, Daily    tafasitamab-cxix (MONJUVI IV) 200 mg, intravenous, Every 14 days       Allergies:   Allergies   Allergen Reactions    Ciprofloxacin Unknown    Diltiazem Hcl Unknown    Iodinated Contrast Media Unknown    Metronidazole Unknown    Nizatidine Unknown    Penicillins Unknown    Sertraline Unknown    Sulfamethoxazole Unknown    Venlafaxine Unknown           CBC/CMP 5/15/24     PHYSICAL EXAMINATION  Vital Signs:   No VS due to VV    PE  Voice clear   Thoughts coherent     ASSESSMENT/PLAN    Pain  Pain is: chronic back pain / CIPN  Type: somatic and neuropathic  -norco 10/325mg TID PRN and lyrica 25mg BID per PCP   -discussed she does not qualify for opioid management by supportive oncology at this time   -start duloxetine 20mg daily (she has not started this yet)   -lidocaine patch PRN   -may use  additional APAP 500mg TID (max dosing 3g daily - she is aware norco has tylenol in it)  -pain mgmt referral per patient request (referral placed again today)     Constipation, at risk OIC  -patient reports controlled by using probiotic    Altered Mood  Chronic anxiety and depression related to health concerns   -duloxetine per above   -ativan per pcp     Sleeping Difficulty:  -continue melatonin at bedtime     Decreased appetite  No weight loss  -small, frequent, high protein meals     Medical Decision Making/Goals of Care/Advance Care Planning:  Patient's current clinical condition, including diagnosis, prognosis, and management plan, and goals of care were discussed.   Goals: Fu for surveillance      Advance Directives  Deferred until next visit     Next Follow-Up Visit:  Return to clinic in 6 weeks     Signature and billing  Thank you for allowing us to participate in the care of this patient. Recommendations will be communicated back to the consulting service by way of shared electronic medical record or face-to-face.    Medical complexity was moderate level due to due to complexity of problems, extensive data review, and high risk of management/treatment.  Time was spent on the following: Prep Time, Time Directly with Patient/Family/Caregiver, Documentation Time. Total time spent: 30      DATA   Diagnostic tests and information reviewed for today's visit:  Most recent labs and imaging results, Medications       Plan of Care discussed with: Patient and RN    Some elements copied from Supportive Oncology note on 5/1/24, the elements have been updated and all reflect current decision making from today, 5/29/24.      SIGNATURE: CORNELIUS Pettit-CNP    Contact information:  Supportive and Palliative Oncology  Monday-Friday 8 AM-5 PM  Phone:  [phone], press option #5, then option #1.   Or Epic Secure Chat

## 2024-06-06 ENCOUNTER — TELEPHONE (OUTPATIENT)
Dept: HEMATOLOGY/ONCOLOGY | Facility: HOSPITAL | Age: 89
End: 2024-06-06
Payer: MEDICARE

## 2024-06-06 DIAGNOSIS — F41.9 ANXIETY: ICD-10-CM

## 2024-06-06 NOTE — TELEPHONE ENCOUNTER
Refill request received for Lorazepam 0.5mg.  Preferred pharmacy is [facility] in [place].  Message sent to Palliative Care team.

## 2024-06-06 NOTE — TELEPHONE ENCOUNTER
This is managed through her PCP - Dr Coulter.  I tried to call patient to advise, but no answer, no voicemail set up.

## 2024-06-07 RX ORDER — LORAZEPAM 0.5 MG/1
0.5 TABLET ORAL DAILY PRN
Qty: 30 TABLET | Refills: 0 | Status: SHIPPED | OUTPATIENT
Start: 2024-06-07

## 2024-06-07 NOTE — TELEPHONE ENCOUNTER
Simi is calling stating that the last ativan was filled by [facility] and she would like to know if it can be filled here? She is seeing Bertha on Tuesday.      She will also try to call her primary care.    Message sent

## 2024-06-10 ENCOUNTER — SPECIALTY PHARMACY (OUTPATIENT)
Dept: PHARMACY | Facility: CLINIC | Age: 89
End: 2024-06-10

## 2024-06-10 PROCEDURE — RXMED WILLOW AMBULATORY MEDICATION CHARGE

## 2024-06-10 ASSESSMENT — ENCOUNTER SYMPTOMS
FATIGUE: 1
UNEXPECTED WEIGHT CHANGE: 0
DIARRHEA: 0
DYSURIA: 0
NAUSEA: 0
CHILLS: 0
FEVER: 0
WHEEZING: 0
COUGH: 0
SHORTNESS OF BREATH: 0
WOUND: 0
BLOOD IN STOOL: 0
VOMITING: 0
APPETITE CHANGE: 1
LEG SWELLING: 0
DIAPHORESIS: 0
CONSTIPATION: 0
HEMATURIA: 0
NUMBNESS: 1
PALPITATIONS: 0
ADENOPATHY: 0

## 2024-06-11 ENCOUNTER — INFUSION (OUTPATIENT)
Dept: HEMATOLOGY/ONCOLOGY | Facility: CLINIC | Age: 89
End: 2024-06-11
Payer: MEDICARE

## 2024-06-11 ENCOUNTER — OFFICE VISIT (OUTPATIENT)
Dept: HEMATOLOGY/ONCOLOGY | Facility: CLINIC | Age: 89
End: 2024-06-11
Payer: MEDICARE

## 2024-06-11 VITALS
HEART RATE: 76 BPM | SYSTOLIC BLOOD PRESSURE: 166 MMHG | RESPIRATION RATE: 16 BRPM | DIASTOLIC BLOOD PRESSURE: 70 MMHG | TEMPERATURE: 97.7 F

## 2024-06-11 VITALS
HEART RATE: 71 BPM | SYSTOLIC BLOOD PRESSURE: 167 MMHG | TEMPERATURE: 95.4 F | RESPIRATION RATE: 18 BRPM | WEIGHT: 150 LBS | OXYGEN SATURATION: 95 % | BODY MASS INDEX: 26.25 KG/M2 | DIASTOLIC BLOOD PRESSURE: 63 MMHG

## 2024-06-11 DIAGNOSIS — C83.38 DIFFUSE LARGE B-CELL LYMPHOMA OF LYMPH NODES OF MULTIPLE REGIONS (MULTI): Primary | ICD-10-CM

## 2024-06-11 DIAGNOSIS — F41.9 ANXIETY: ICD-10-CM

## 2024-06-11 DIAGNOSIS — C83.38 DIFFUSE LARGE B-CELL LYMPHOMA OF LYMPH NODES OF MULTIPLE REGIONS (MULTI): ICD-10-CM

## 2024-06-11 LAB
ALBUMIN SERPL BCP-MCNC: 4.3 G/DL (ref 3.4–5)
ALP SERPL-CCNC: 67 U/L (ref 33–136)
ALT SERPL W P-5'-P-CCNC: 17 U/L (ref 7–45)
ANION GAP SERPL CALC-SCNC: 14 MMOL/L (ref 10–20)
AST SERPL W P-5'-P-CCNC: 18 U/L (ref 9–39)
BASOPHILS # BLD AUTO: 0.05 X10*3/UL (ref 0–0.1)
BASOPHILS NFR BLD AUTO: 0.6 %
BILIRUB SERPL-MCNC: 0.6 MG/DL (ref 0–1.2)
BUN SERPL-MCNC: 16 MG/DL (ref 6–23)
CALCIUM SERPL-MCNC: 9.9 MG/DL (ref 8.6–10.6)
CHLORIDE SERPL-SCNC: 97 MMOL/L (ref 98–107)
CO2 SERPL-SCNC: 25 MMOL/L (ref 21–32)
CREAT SERPL-MCNC: 0.93 MG/DL (ref 0.5–1.05)
EGFRCR SERPLBLD CKD-EPI 2021: 60 ML/MIN/1.73M*2
EOSINOPHIL # BLD AUTO: 0.13 X10*3/UL (ref 0–0.4)
EOSINOPHIL NFR BLD AUTO: 1.4 %
ERYTHROCYTE [DISTWIDTH] IN BLOOD BY AUTOMATED COUNT: 12.7 % (ref 11.5–14.5)
GLUCOSE SERPL-MCNC: 88 MG/DL (ref 74–99)
HCT VFR BLD AUTO: 42 % (ref 36–46)
HGB BLD-MCNC: 14.5 G/DL (ref 12–16)
IMM GRANULOCYTES # BLD AUTO: 0.01 X10*3/UL (ref 0–0.5)
IMM GRANULOCYTES NFR BLD AUTO: 0.1 % (ref 0–0.9)
LYMPHOCYTES # BLD AUTO: 1.21 X10*3/UL (ref 0.8–3)
LYMPHOCYTES NFR BLD AUTO: 13.3 %
MAGNESIUM SERPL-MCNC: 1.56 MG/DL (ref 1.6–2.4)
MCH RBC QN AUTO: 30.7 PG (ref 26–34)
MCHC RBC AUTO-ENTMCNC: 34.5 G/DL (ref 32–36)
MCV RBC AUTO: 89 FL (ref 80–100)
MONOCYTES # BLD AUTO: 1.39 X10*3/UL (ref 0.05–0.8)
MONOCYTES NFR BLD AUTO: 15.3 %
NEUTROPHILS # BLD AUTO: 6.28 X10*3/UL (ref 1.6–5.5)
NEUTROPHILS NFR BLD AUTO: 69.3 %
NRBC BLD-RTO: ABNORMAL /100{WBCS}
PLATELET # BLD AUTO: 288 X10*3/UL (ref 150–450)
POTASSIUM SERPL-SCNC: 4.2 MMOL/L (ref 3.5–5.3)
PROT SERPL-MCNC: 6.5 G/DL (ref 6.4–8.2)
RBC # BLD AUTO: 4.72 X10*6/UL (ref 4–5.2)
SODIUM SERPL-SCNC: 132 MMOL/L (ref 136–145)
WBC # BLD AUTO: 9.1 X10*3/UL (ref 4.4–11.3)

## 2024-06-11 PROCEDURE — 99214 OFFICE O/P EST MOD 30 MIN: CPT | Performed by: INTERNAL MEDICINE

## 2024-06-11 PROCEDURE — 1125F AMNT PAIN NOTED PAIN PRSNT: CPT | Performed by: INTERNAL MEDICINE

## 2024-06-11 PROCEDURE — 83735 ASSAY OF MAGNESIUM: CPT | Performed by: INTERNAL MEDICINE

## 2024-06-11 PROCEDURE — 3078F DIAST BP <80 MM HG: CPT | Performed by: INTERNAL MEDICINE

## 2024-06-11 PROCEDURE — 36591 DRAW BLOOD OFF VENOUS DEVICE: CPT

## 2024-06-11 PROCEDURE — 85025 COMPLETE CBC W/AUTO DIFF WBC: CPT | Performed by: INTERNAL MEDICINE

## 2024-06-11 PROCEDURE — 3077F SYST BP >= 140 MM HG: CPT | Performed by: INTERNAL MEDICINE

## 2024-06-11 PROCEDURE — 1159F MED LIST DOCD IN RCRD: CPT | Performed by: INTERNAL MEDICINE

## 2024-06-11 PROCEDURE — 1157F ADVNC CARE PLAN IN RCRD: CPT | Performed by: INTERNAL MEDICINE

## 2024-06-11 PROCEDURE — 80053 COMPREHEN METABOLIC PANEL: CPT | Performed by: INTERNAL MEDICINE

## 2024-06-11 RX ORDER — HEPARIN 100 UNIT/ML
500 SYRINGE INTRAVENOUS AS NEEDED
OUTPATIENT
Start: 2024-06-11

## 2024-06-11 RX ORDER — HEPARIN SODIUM,PORCINE/PF 10 UNIT/ML
50 SYRINGE (ML) INTRAVENOUS AS NEEDED
OUTPATIENT
Start: 2024-06-11

## 2024-06-11 RX ORDER — LANOLIN ALCOHOL/MO/W.PET/CERES
1 CREAM (GRAM) TOPICAL
COMMUNITY
Start: 2024-05-05

## 2024-06-11 RX ORDER — HYDROXYZINE HYDROCHLORIDE 10 MG/1
5 TABLET, FILM COATED ORAL ONCE
Qty: 30 TABLET | Refills: 1 | Status: SHIPPED | OUTPATIENT
Start: 2024-06-11 | End: 2024-06-11

## 2024-06-11 RX ORDER — MAGNESIUM 64 MG (MAGNESIUM CHLORIDE) TABLET,DELAYED RELEASE
2
COMMUNITY
Start: 2024-02-14

## 2024-06-11 RX ORDER — HEPARIN SODIUM,PORCINE/PF 10 UNIT/ML
50 SYRINGE (ML) INTRAVENOUS AS NEEDED
Status: DISCONTINUED | OUTPATIENT
Start: 2024-06-11 | End: 2024-06-11 | Stop reason: HOSPADM

## 2024-06-11 RX ORDER — HEPARIN 100 UNIT/ML
500 SYRINGE INTRAVENOUS AS NEEDED
Status: DISCONTINUED | OUTPATIENT
Start: 2024-06-11 | End: 2024-06-11 | Stop reason: HOSPADM

## 2024-06-11 ASSESSMENT — PAIN SCALES - GENERAL
PAINLEVEL: 4
PAINLEVEL: 4

## 2024-06-11 NOTE — PROGRESS NOTES
Patient here alone for follow up with Dr Mac. She reports that she took Cymbalta today and feels it is making her anxiety worse. She stated she is going to discuss stopping it with the provider who prescribed it for her. Meds and allergies reviewed and updated. MD made aware. Labs drawn today. Gaby Baron RN

## 2024-06-11 NOTE — PROGRESS NOTES
Patient ID: Simi Sandoval is a 88 y.o. female.    Treatment:   Oncology History Overview Note   1. 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 CT imaging was negative for cycle 2 of chemotherapy. The patient subsequently received treatment with 30 Gy radiation to the left breast completing August 2008.    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  Bulky recurrence documented 8/30/19 after presentation with hyponatremia. Bronchoscopy at [facility] on 8/30/19  showed diffuse large B cell lymphoma non germinal center cell with double expression of bcl2 and cmyc, but not double hit.   PET scan from [employer] system done 9/9/19 ordered by Dr. Ulices Leon with findings: lobulated mass in RUL 3.6 x 2.4 cm with SUV 36.8, hilar lymphadenopathy on right  with SUV 32.6, also uptake pleura and intercostal musculature. Abdominal findings include  several abnormal foci in liver , portacaval area with SUV of 4.5, uptake in T1 suspicious.   Initiation of R-gem cis 9/17/19, Completed cycle 5 of R-gem cis on 12/26/2019 with negative PET imaging on 1/30/2020.  Initiation of revlimid maintenance February 2020. discontinued due to poor tolerance  10/2020 Dx with recurrent lyphoma left chest wall  12/1/2020 Started Tafasetinib (Monjuvi) and Revlimid salvage therapy for DLBCL relapse acheived a complete clinical remission.    She completed treatment with Revlimid 5 mg days 1-21/28 days on 11/4/21.  Remains on Tafasitamab (Monjuvi) on days 1 Last dose of Monjuvi after PET imaging 5/8/23 was clear except for questionable  area right cervical level IVB lymph node with SUV of 5.1 due to patient's preference     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)    10/30/2023 Initial Diagnosis    Diffuse large B-cell lymphoma of lymph nodes of multiple regions (CMS/HCC)         Response:     Past Medical History:  1. History of hep C infection diagnosed 2008, not currently on therapy.   2. Diverticulitis May 2012 an admission for diverticulitis in July 2013.   3. Gastroesophageal reflux disease, irritable bowel syndrome.   4. atrial fibrillation, in 2013, and in the setting of hyperthyroidism.   5.  Right breast biopsy 3/22/2012 for abnormal calcifications- benign  6. Chronic hyponatremia    Surgical History:  Lumbar spince surgery January 2017     Family History:   No family history on file.    Social History:     Social History     Tobacco Use    Smoking status: Never     Passive exposure: Never    Smokeless tobacco: Never      -------------------------------------------------------------------------------------------------------  Subjective       HPI    Simi Sandoval is a 88 year old woman who had stage IA diffuse large B-cell lymphoma involving the left breast, initially diagnosed in 2007 with multiple recurrences, who initiated  (12/1/20) Tafasitinib (Monjuvi) and Revlimid salvage therapy for DLBCL relapse acheiving a complete clinical remission and is on tafasitinib maintenance every 2 weeks.      Most recent CT Staging for disease assessment (2/2/22) with no noncontrast CT evidence of intrathoracic or adb/pelvic lymphoproliferative process, usual fluid filled loops of bowel. Previously seen 3 mm left lower lobe pulmonary nodule is not conspicuous  on current imaging.     She completed treatment with Revlimid 5 mg days 1-21/28 days on 11/4/21.  Last dose of Monjuvi after PET imaging 5/8/23 was clear except for questionable  area right cervical level IVB lymph node with SUV of 5.1.  CT scan (8/18/23) SARAI.  Patient currently on a treatment holiday.    Simi Sandoval is a 88 year old patient who presents to the clinic today (6/11/24) for evaluation of her DLBCL in  remission, was most recently treated with tafasitinib May 2023 and has been on observation.  CT imaging done  on 3/1/24  show no evidence of disease.   Patient reports she was admitted to [facility] early February 2024 with pneumonia and a bowel impaction and low magnesium and was discharged to home.   Patient again admitted to [facility] 5/15 to 5/29/24 with chronic back pain and weakness and was found to have a low sodium, UTI was ruled out.  CT abdomen and pelvis showed distended bladder, CT brain small vessel ischemic changes and lumbar spine showed prior surgery.  Duloxetine makes her feel dizzy and lightheaded.  Lorazepam helps with anxiety. Eating and drinking okay.         Review of Systems   Constitutional:  Positive for appetite change and fatigue. Negative for chills, diaphoresis, fever and unexpected weight change.   Respiratory:  Negative for cough, shortness of breath and wheezing.    Cardiovascular:  Negative for chest pain, leg swelling (mild swelling to bilateral legs) and palpitations.   Gastrointestinal:  Negative for blood in stool, constipation, diarrhea, nausea and vomiting.   Genitourinary:  Negative for dysuria and hematuria.    Musculoskeletal:  Negative for gait problem.   Skin:  Negative for rash and wound.   Neurological:  Positive for dizziness, light-headedness and numbness (left arm and hand). Negative for gait problem.   Hematological:  Negative for adenopathy.      -------------------------------------------------------------------------------------------------------  Objective   BSA: 1.74 meters squared  /63   Pulse 71   Temp 35.2 °C (95.4 °F) (Core)   Resp 18   Wt 68 kg (150 lb)   SpO2 95%   BMI 26.25 kg/m²     Physical Exam  Vitals reviewed.   Constitutional:       Appearance: Normal appearance.      Comments: Looks pale in no distress   HENT:      Head: Normocephalic and atraumatic.      Nose: Nose normal.      Mouth/Throat:      Mouth: Mucous membranes are moist.    Eyes:      Extraocular Movements: Extraocular movements intact.      Conjunctiva/sclera: Conjunctivae normal.      Pupils: Pupils are equal, round, and reactive to light.   Cardiovascular:      Rate and Rhythm: Normal rate and regular rhythm.      Pulses: Normal pulses.      Heart sounds: Normal heart sounds. No murmur heard.     No friction rub. No gallop.      Comments: Normal rate, irregular   Pulmonary:      Effort: Pulmonary effort is normal. No respiratory distress.      Breath sounds: Normal breath sounds. No wheezing.   Abdominal:      General: Abdomen is flat. Bowel sounds are normal. There is no distension.      Palpations: Abdomen is soft. There is no mass.      Tenderness: There is no abdominal tenderness.   Musculoskeletal:         General: Normal range of motion.      Right lower leg: No edema (non-pitting).      Left lower leg: No edema (non-pitting).   Lymphadenopathy:      Comments: No lymphadenopathy   Skin:     General: Skin is warm and dry.      Comments: Possible lipoma to left shoulder, soft/rubbery, non-tender.   Neurological:      General: No focal deficit present.      Mental Status: She is alert and oriented to person, place, and time. Mental status is at baseline.   Psychiatric:         Mood and Affect: Mood normal.         Behavior: Behavior normal.         Thought Content: Thought content normal.       Performance Status:  ECOG Performance Status: 1 restricted from physically strenuous work  -------------------------------------------------------------------------------------------------------  Assessment/Plan      DLBCL Third recurrence October 2020 extensive recurrence in left pleural as well as other sites.        Initiated Tafasitamab (Monjuvi) 12 g/kg IV  in combination with Revlimid on 12/1/20.   Monjuvi will be given on day 1,4,8,15, 22 of 28 day cycle for first cycle, then weekly (days 1,8,15, & 22) for cycles 2,3, then cycles 4 and beyond on days 1 & 15/28  day cycles.   Acheived CR     PET imaging (5/8/23) - interval complete resolution of hypermetabolic activity involving a peripancreatic lymph node.  Additionally, the previously described periaortic lymph node has also demonstrated complete interval resolution of hypermetabolic activity that was present in previous imaging on 2/1/23 but now describes a new right level IVB lymph node demonstrating  intense hypermetabolic activity with maximum SUV of 5.1      Completed 33 cycles of Tafasitamab (Monjuvi) scheduled on May 16 2023 and requested treatment holiday.   Disease surveillance 3/1/24 SARAI!    6/11/24 Patient is status quo with no evidence of recurrent disease. Recent hospital stay, given hydroxyzine for anxiety. Consider reimaging late fall      In the future bite molecules would be an option.         Pain management -  currently under good control on Lyrica and restless leg med. No longer seeing pall med, currently using acetaminophen, hydrocodone-acetaminophen, pregabalin, and lidocaine patches     3. History of hepatitis C. The patient has been followed by Dr. Boby Hale in  past and now followed by her PCP Dr. Coulter at [facility], unclear what tests have been done. LFTs have been normal.     4. Atrial fibrillation and hypertension:  Patent has had regular followup with cardiologist  at [facility] ( Dr. Paul Harris). Continues Metoprolol with rate control.  Also on Eliquis 5mg BID  & Amlodipine.      5. Mild memory loss-  seems to be functioning pretty well  not clear whether she has seen geriatrics.     6. Health maintenance- Prevnar 13 vaccine updated  10/29/16, has had flu shot 2019.  Mammogram completed 6/2019. Has completed COVID vaccines  and booster and Evusheld April 2022.  1/2/24:  Advised patient to receive flu, covid, RSV vaccines at any local pharmacy.     7. Psychosocial- anxiety and depression as above.  -uses prn lorazepam for anxiety     8.  Goals of Care: last confirmed 10/31/23 that the patient does not want to be  resuscitated or be placed on life support systems.  She would want  afib addressed. Patient informed me that although she has been  from her  for years, they are still . Strongly encouraged patient to complete health care POA to determine who would make medica decisions Debbie RIVERAW in to consult     Central Line:  Mediport to right chest, site with no erythema, tenderness, edema, or drainage     Electrolyte disorders:  continues on oral magnesium and potassium, labs from yesterday show oral supplements effective    RTC: Infusion for port flush in monthly   Follow up visit with Dr. Mac in 3 months with blood draw. .-------------------------------------------------------------------------------------------------------  Stella Mac MD

## 2024-06-12 ENCOUNTER — TELEPHONE (OUTPATIENT)
Dept: PALLIATIVE MEDICINE | Facility: HOSPITAL | Age: 89
End: 2024-06-12
Payer: MEDICARE

## 2024-06-12 ENCOUNTER — PHARMACY VISIT (OUTPATIENT)
Dept: PHARMACY | Facility: CLINIC | Age: 89
End: 2024-06-12
Payer: COMMERCIAL

## 2024-06-12 ASSESSMENT — ENCOUNTER SYMPTOMS
DIZZINESS: 1
LIGHT-HEADEDNESS: 1

## 2024-06-12 NOTE — TELEPHONE ENCOUNTER
Called patient to see if she started the Duloxetine. She stated she tried it, made her light headed and dizzy. Received a prescription for Lorazepam from her Doctor at [facility]. No other needs at this time, encouraged to call if needed

## 2024-07-12 ENCOUNTER — SPECIALTY PHARMACY (OUTPATIENT)
Dept: PHARMACY | Facility: CLINIC | Age: 89
End: 2024-07-12

## 2024-07-12 ENCOUNTER — PHARMACY VISIT (OUTPATIENT)
Dept: PHARMACY | Facility: CLINIC | Age: 89
End: 2024-07-12
Payer: COMMERCIAL

## 2024-07-12 PROCEDURE — RXMED WILLOW AMBULATORY MEDICATION CHARGE

## 2024-07-15 ENCOUNTER — LAB (OUTPATIENT)
Dept: HEMATOLOGY/ONCOLOGY | Facility: CLINIC | Age: 89
End: 2024-07-15
Payer: MEDICARE

## 2024-07-15 VITALS
RESPIRATION RATE: 18 BRPM | TEMPERATURE: 96.3 F | HEART RATE: 56 BPM | DIASTOLIC BLOOD PRESSURE: 64 MMHG | SYSTOLIC BLOOD PRESSURE: 143 MMHG | OXYGEN SATURATION: 92 % | BODY MASS INDEX: 26.25 KG/M2 | WEIGHT: 150 LBS

## 2024-07-15 DIAGNOSIS — C83.30 DIFFUSE LARGE B-CELL LYMPHOMA, UNSPECIFIED SITE (MULTI): ICD-10-CM

## 2024-07-15 DIAGNOSIS — C83.38 DIFFUSE LARGE B-CELL LYMPHOMA OF LYMPH NODES OF MULTIPLE REGIONS (MULTI): ICD-10-CM

## 2024-07-15 LAB
ALBUMIN SERPL BCP-MCNC: 3.9 G/DL (ref 3.4–5)
ALP SERPL-CCNC: 72 U/L (ref 33–136)
ALT SERPL W P-5'-P-CCNC: 15 U/L (ref 7–45)
ANION GAP SERPL CALC-SCNC: 14 MMOL/L (ref 10–20)
AST SERPL W P-5'-P-CCNC: 18 U/L (ref 9–39)
BASOPHILS # BLD AUTO: 0.06 X10*3/UL (ref 0–0.1)
BASOPHILS NFR BLD AUTO: 0.6 %
BILIRUB SERPL-MCNC: 0.5 MG/DL (ref 0–1.2)
BUN SERPL-MCNC: 12 MG/DL (ref 6–23)
CALCIUM SERPL-MCNC: 9.4 MG/DL (ref 8.6–10.6)
CHLORIDE SERPL-SCNC: 100 MMOL/L (ref 98–107)
CO2 SERPL-SCNC: 24 MMOL/L (ref 21–32)
CREAT SERPL-MCNC: 0.94 MG/DL (ref 0.5–1.05)
EGFRCR SERPLBLD CKD-EPI 2021: 59 ML/MIN/1.73M*2
EOSINOPHIL # BLD AUTO: 0.18 X10*3/UL (ref 0–0.4)
EOSINOPHIL NFR BLD AUTO: 1.8 %
ERYTHROCYTE [DISTWIDTH] IN BLOOD BY AUTOMATED COUNT: 12.6 % (ref 11.5–14.5)
GLUCOSE SERPL-MCNC: 79 MG/DL (ref 74–99)
HCT VFR BLD AUTO: 41.6 % (ref 36–46)
HGB BLD-MCNC: 14.4 G/DL (ref 12–16)
IMM GRANULOCYTES # BLD AUTO: 0.02 X10*3/UL (ref 0–0.5)
IMM GRANULOCYTES NFR BLD AUTO: 0.2 % (ref 0–0.9)
LDH SERPL L TO P-CCNC: 127 U/L (ref 84–246)
LYMPHOCYTES # BLD AUTO: 1.52 X10*3/UL (ref 0.8–3)
LYMPHOCYTES NFR BLD AUTO: 15.1 %
MAGNESIUM SERPL-MCNC: 1.56 MG/DL (ref 1.6–2.4)
MCH RBC QN AUTO: 31.2 PG (ref 26–34)
MCHC RBC AUTO-ENTMCNC: 34.6 G/DL (ref 32–36)
MCV RBC AUTO: 90 FL (ref 80–100)
MONOCYTES # BLD AUTO: 1.77 X10*3/UL (ref 0.05–0.8)
MONOCYTES NFR BLD AUTO: 17.6 %
NEUTROPHILS # BLD AUTO: 6.52 X10*3/UL (ref 1.6–5.5)
NEUTROPHILS NFR BLD AUTO: 64.7 %
NRBC BLD-RTO: ABNORMAL /100{WBCS}
PLATELET # BLD AUTO: 289 X10*3/UL (ref 150–450)
POTASSIUM SERPL-SCNC: 4.8 MMOL/L (ref 3.5–5.3)
PROT SERPL-MCNC: 6.3 G/DL (ref 6.4–8.2)
RBC # BLD AUTO: 4.61 X10*6/UL (ref 4–5.2)
SODIUM SERPL-SCNC: 133 MMOL/L (ref 136–145)
WBC # BLD AUTO: 10.1 X10*3/UL (ref 4.4–11.3)

## 2024-07-15 PROCEDURE — 83615 LACTATE (LD) (LDH) ENZYME: CPT | Performed by: INTERNAL MEDICINE

## 2024-07-15 PROCEDURE — 84075 ASSAY ALKALINE PHOSPHATASE: CPT | Performed by: INTERNAL MEDICINE

## 2024-07-15 PROCEDURE — 85025 COMPLETE CBC W/AUTO DIFF WBC: CPT | Performed by: INTERNAL MEDICINE

## 2024-07-15 PROCEDURE — 36591 DRAW BLOOD OFF VENOUS DEVICE: CPT

## 2024-07-15 PROCEDURE — 2500000004 HC RX 250 GENERAL PHARMACY W/ HCPCS (ALT 636 FOR OP/ED): Performed by: INTERNAL MEDICINE

## 2024-07-15 PROCEDURE — 83735 ASSAY OF MAGNESIUM: CPT | Performed by: INTERNAL MEDICINE

## 2024-07-15 RX ORDER — HEPARIN SODIUM,PORCINE/PF 10 UNIT/ML
50 SYRINGE (ML) INTRAVENOUS AS NEEDED
OUTPATIENT
Start: 2024-07-15

## 2024-07-15 RX ORDER — HEPARIN 100 UNIT/ML
500 SYRINGE INTRAVENOUS AS NEEDED
OUTPATIENT
Start: 2024-07-15

## 2024-07-15 RX ORDER — HEPARIN SODIUM,PORCINE/PF 10 UNIT/ML
50 SYRINGE (ML) INTRAVENOUS AS NEEDED
Status: DISCONTINUED | OUTPATIENT
Start: 2024-07-15 | End: 2024-07-15 | Stop reason: HOSPADM

## 2024-07-15 RX ORDER — HEPARIN 100 UNIT/ML
500 SYRINGE INTRAVENOUS AS NEEDED
Status: DISCONTINUED | OUTPATIENT
Start: 2024-07-15 | End: 2024-07-15 | Stop reason: HOSPADM

## 2024-07-15 ASSESSMENT — PAIN SCALES - GENERAL: PAINLEVEL: 0-NO PAIN

## 2024-07-19 ENCOUNTER — TELEPHONE (OUTPATIENT)
Dept: PALLIATIVE MEDICINE | Facility: HOSPITAL | Age: 89
End: 2024-07-19
Payer: MEDICARE

## 2024-07-19 NOTE — TELEPHONE ENCOUNTER
Called patient to reschedule missed virtual appointment with Vijaya Lucero CNP 7/17/24. Also to see if she has been taking the Cymbalta. Message left encouraging a return call    7/22/24: Another call made and message left

## 2024-07-24 ENCOUNTER — TELEPHONE (OUTPATIENT)
Dept: PALLIATIVE MEDICINE | Facility: HOSPITAL | Age: 89
End: 2024-07-24
Payer: MEDICARE

## 2024-07-24 NOTE — TELEPHONE ENCOUNTER
Called patient to schedule a FUV visit and to see if she was still taking the Duloxetine. She stated yes on the medication and scheduled her next appointment. Information shared with Vijaya Lucero CNP and she stated patient can stop taking the Duloxetine and does not have to see her. This was shared with the patient and acknowledged, appointment cancelled. Encouraged her to call and schedule if she develops increased Neuropathy, acknowledged

## 2024-07-29 ENCOUNTER — SPECIALTY PHARMACY (OUTPATIENT)
Dept: PHARMACY | Facility: CLINIC | Age: 89
End: 2024-07-29

## 2024-07-29 DIAGNOSIS — C83.38 DIFFUSE LARGE B-CELL LYMPHOMA OF LYMPH NODES OF MULTIPLE REGIONS (MULTI): ICD-10-CM

## 2024-07-29 RX ORDER — APIXABAN 5 MG/1
TABLET, FILM COATED ORAL
Qty: 180 TABLET | Refills: 1 | Status: CANCELLED | OUTPATIENT
Start: 2024-07-29 | End: 2025-07-29

## 2024-07-30 DIAGNOSIS — C83.38 DIFFUSE LARGE B-CELL LYMPHOMA OF LYMPH NODES OF MULTIPLE REGIONS (MULTI): ICD-10-CM

## 2024-08-02 ENCOUNTER — APPOINTMENT (OUTPATIENT)
Dept: PALLIATIVE MEDICINE | Facility: HOSPITAL | Age: 89
End: 2024-08-02
Payer: MEDICARE

## 2024-08-05 PROCEDURE — RXMED WILLOW AMBULATORY MEDICATION CHARGE

## 2024-08-06 ENCOUNTER — TELEPHONE (OUTPATIENT)
Dept: ADMISSION | Facility: HOSPITAL | Age: 89
End: 2024-08-06
Payer: MEDICARE

## 2024-08-06 DIAGNOSIS — F41.9 ANXIETY: Primary | ICD-10-CM

## 2024-08-06 RX ORDER — LORAZEPAM 0.5 MG/1
0.5 TABLET ORAL SEE ADMIN INSTRUCTIONS
Qty: 30 TABLET | Refills: 0 | Status: SHIPPED | OUTPATIENT
Start: 2024-08-06 | End: 2024-09-05

## 2024-08-06 NOTE — TELEPHONE ENCOUNTER
Pt requesting refill  Ativan 0.5mg. 1 tablet daily prn  Pharmacy: [facility] [place]  Last FUV 5/29, no show for 7/17 FUV

## 2024-08-13 ENCOUNTER — PHARMACY VISIT (OUTPATIENT)
Dept: PHARMACY | Facility: CLINIC | Age: 89
End: 2024-08-13
Payer: COMMERCIAL

## 2024-09-09 ASSESSMENT — ENCOUNTER SYMPTOMS
PALPITATIONS: 0
DIZZINESS: 1
UNEXPECTED WEIGHT CHANGE: 0
WOUND: 0
FATIGUE: 1
COUGH: 0
HEMATURIA: 0
APPETITE CHANGE: 1
SHORTNESS OF BREATH: 0
NUMBNESS: 1
DYSURIA: 0
NAUSEA: 0
ADENOPATHY: 0
LIGHT-HEADEDNESS: 1
CHILLS: 0
CONSTIPATION: 0
VOMITING: 0
FEVER: 0
BLOOD IN STOOL: 0
LEG SWELLING: 0
DIARRHEA: 0
DIAPHORESIS: 0
WHEEZING: 0

## 2024-09-10 ENCOUNTER — APPOINTMENT (OUTPATIENT)
Dept: HEMATOLOGY/ONCOLOGY | Facility: CLINIC | Age: 89
End: 2024-09-10
Payer: MEDICARE

## 2024-09-10 ENCOUNTER — SPECIALTY PHARMACY (OUTPATIENT)
Dept: PHARMACY | Facility: CLINIC | Age: 89
End: 2024-09-10

## 2024-09-10 PROCEDURE — RXMED WILLOW AMBULATORY MEDICATION CHARGE

## 2024-09-10 NOTE — PROGRESS NOTES
Patient ID: Simi Sandoval is a 88 y.o. female.    Treatment:   Oncology History Overview Note   1. 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 CT imaging was negative for cycle 2 of chemotherapy. The patient subsequently received treatment with 30 Gy radiation to the left breast completing August 2008.    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  Bulky recurrence documented 8/30/19 after presentation with hyponatremia. Bronchoscopy at [facility] on 8/30/19  showed diffuse large B cell lymphoma non germinal center cell with double expression of bcl2 and cmyc, but not double hit.   PET scan from [employer] system done 9/9/19 ordered by Dr. Ulices Leon with findings: lobulated mass in RUL 3.6 x 2.4 cm with SUV 36.8, hilar lymphadenopathy on right  with SUV 32.6, also uptake pleura and intercostal musculature. Abdominal findings include  several abnormal foci in liver , portacaval area with SUV of 4.5, uptake in T1 suspicious.   Initiation of R-gem cis 9/17/19, Completed cycle 5 of R-gem cis on 12/26/2019 with negative PET imaging on 1/30/2020.  Initiation of revlimid maintenance February 2020. discontinued due to poor tolerance  10/2020 Dx with recurrent lyphoma left chest wall  12/1/2020 Started Tafasetinib (Monjuvi) and Revlimid salvage therapy for DLBCL relapse acheived a complete clinical remission.    She completed treatment with Revlimid 5 mg days 1-21/28 days on 11/4/21.  Remains on Tafasitamab (Monjuvi) on days 1 Last dose of Monjuvi after PET imaging 5/8/23 was clear except for questionable  area right cervical level IVB lymph node with SUV of 5.1 due to patient's preference     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)    10/30/2023 Initial Diagnosis    Diffuse large B-cell lymphoma of lymph nodes of multiple regions (CMS/HCC)         Response:     Past Medical History:  1. History of hep C infection diagnosed 2008, not currently on therapy.   2. Diverticulitis May 2012 an admission for diverticulitis in July 2013.   3. Gastroesophageal reflux disease, irritable bowel syndrome.   4. atrial fibrillation, in 2013, and in the setting of hyperthyroidism.   5.  Right breast biopsy 3/22/2012 for abnormal calcifications- benign  6. Chronic hyponatremia    Surgical History:  Lumbar spince surgery January 2017     Family History:   No family history on file.    Social History:     Social History     Tobacco Use    Smoking status: Never     Passive exposure: Never    Smokeless tobacco: Never      -------------------------------------------------------------------------------------------------------  Subjective       HPI    Simi Sandoval is a 88 year old woman who had stage IA diffuse large B-cell lymphoma involving the left breast, initially diagnosed in 2007 with multiple recurrences, who initiated  (12/1/20) Tafasitinib (Monjuvi) and Revlimid salvage therapy for DLBCL relapse acheiving a complete clinical remission and is on tafasitinib maintenance every 2 weeks.      Most recent CT Staging for disease assessment (2/2/22) with no noncontrast CT evidence of intrathoracic or adb/pelvic lymphoproliferative process, usual fluid filled loops of bowel. Previously seen 3 mm left lower lobe pulmonary nodule is not conspicuous  on current imaging.     She completed treatment with Revlimid 5 mg days 1-21/28 days on 11/4/21.  Last dose of Monjuvi after PET imaging 5/8/23 was clear except for questionable  area right cervical level IVB lymph node with SUV of 5.1.  CT scan (8/18/23) [facility].  Patient currently on a treatment holiday.    Simi Sandoval is a 88 year old patient who presents to the clinic today (9/10/24) for evaluation of her DLBCL in  remission, was most recently treated with tafasitinib May 2023 and has been on observation.  CT imaging done  on 3/1/24  show no evidence of disease.   Patient reports she was admitted to [facility] early February 2024 with pneumonia and a bowel impaction and low magnesium and was discharged to home.   Patient again admitted to [facility] 5/15 to 5/29/24 with chronic back pain and weakness and was found to have a low sodium, UTI was ruled out.  CT abdomen and pelvis showed distended bladder, CT brain small vessel ischemic changes and lumbar spine showed prior surgery.  Duloxetine makes her feel dizzy and lightheaded.  Lorazepam helps with anxiety. Eating and drinking okay.     Patient evaluated at [facility] June 2024 with brain MRI which showed mild white matter chance chronic microvascular ischemia.         Review of Systems   Constitutional:  Positive for appetite change and fatigue. Negative for chills, diaphoresis, fever and unexpected weight change.   Respiratory:  Negative for cough, shortness of breath and wheezing.    Cardiovascular:  Negative for chest pain, leg swelling (mild swelling to bilateral legs) and palpitations.   Gastrointestinal:  Negative for blood in stool, constipation, diarrhea, nausea and vomiting.   Genitourinary:  Negative for dysuria and hematuria.    Musculoskeletal:  Negative for gait problem.   Skin:  Negative for rash and wound.   Neurological:  Positive for dizziness, light-headedness and numbness (left arm and hand). Negative for gait problem.   Hematological:  Negative for adenopathy.      -------------------------------------------------------------------------------------------------------  Objective   BSA: There is no height or weight on file to calculate BSA.  There were no vitals taken for this visit.    Physical Exam  Vitals reviewed.   Constitutional:       Appearance: Normal appearance.      Comments: Looks pale in no distress   HENT:      Head: Normocephalic and atraumatic.       Nose: Nose normal.      Mouth/Throat:      Mouth: Mucous membranes are moist.   Eyes:      Extraocular Movements: Extraocular movements intact.      Conjunctiva/sclera: Conjunctivae normal.      Pupils: Pupils are equal, round, and reactive to light.   Cardiovascular:      Rate and Rhythm: Normal rate and regular rhythm.      Pulses: Normal pulses.      Heart sounds: Normal heart sounds. No murmur heard.     No friction rub. No gallop.      Comments: Normal rate, irregular   Pulmonary:      Effort: Pulmonary effort is normal. No respiratory distress.      Breath sounds: Normal breath sounds. No wheezing.   Abdominal:      General: Abdomen is flat. Bowel sounds are normal. There is no distension.      Palpations: Abdomen is soft. There is no mass.      Tenderness: There is no abdominal tenderness.   Musculoskeletal:         General: Normal range of motion.      Right lower leg: No edema (non-pitting).      Left lower leg: No edema (non-pitting).   Lymphadenopathy:      Comments: No lymphadenopathy   Skin:     General: Skin is warm and dry.      Comments: Possible lipoma to left shoulder, soft/rubbery, non-tender.   Neurological:      General: No focal deficit present.      Mental Status: She is alert and oriented to person, place, and time. Mental status is at baseline.   Psychiatric:         Mood and Affect: Mood normal.         Behavior: Behavior normal.         Thought Content: Thought content normal.       Performance Status:  ECOG Performance Status: 1 restricted from physically strenuous work  -------------------------------------------------------------------------------------------------------  Assessment/Plan      DLBCL Third recurrence October 2020 extensive recurrence in left pleural as well as other sites.        Initiated Tafasitamab (Monjuvi) 12 g/kg IV  in combination with Revlimid on 12/1/20.   Monjuvi will be given on day 1,4,8,15, 22 of 28 day cycle for first cycle, then weekly (days 1,8,15, & 22)  for cycles 2,3, then cycles 4 and beyond on days 1 & 15/28  day cycles.  Acheived CR     PET imaging (5/8/23) - interval complete resolution of hypermetabolic activity involving a peripancreatic lymph node.  Additionally, the previously described periaortic lymph node has also demonstrated complete interval resolution of hypermetabolic activity that was present in previous imaging on 2/1/23 but now describes a new right level IVB lymph node demonstrating  intense hypermetabolic activity with maximum SUV of 5.1      Completed 33 cycles of Tafasitamab (Monjuvi) scheduled on May 16 2023 and requested treatment holiday.   Disease surveillance 3/1/24 [facility]!    6/11/24 Patient is status quo with no evidence of recurrent disease. Recent hospital stay, given hydroxyzine for anxiety. Consider reimaging late fall      In the future bite molecules would be an option.         Pain management -  currently under good control on Lyrica and restless leg med. No longer seeing pall med, currently using acetaminophen, hydrocodone-acetaminophen, pregabalin, and lidocaine patches     3. History of hepatitis C. The patient has been followed by Dr. Boby Hale in  past and now followed by her PCP Dr. Coulter at [facility], unclear what tests have been done. LFTs have been normal.     4. Atrial fibrillation and hypertension:  Patent has had regular followup with cardiologist  at [facility] ( Dr. Paul Harris). Continues Metoprolol with rate control.  Also on Eliquis 5mg BID  & Amlodipine.      5. Mild memory loss-  seems to be functioning pretty well  not clear whether she has seen geriatrics.     6. Health maintenance- Prevnar 13 vaccine updated  10/29/16, has had flu shot 2019.  Mammogram completed 6/2019. Has completed COVID vaccines  and booster and Evusheld April 2022.  1/2/24:  Advised patient to receive flu, covid, RSV vaccines at any local pharmacy.     7. Psychosocial- anxiety and depression as above.  -uses prn lorazepam for anxiety     8.  Goals  of Care: last confirmed 10/31/23 that the patient does not want to be resuscitated or be placed on life support systems.  She would want  afib addressed. Patient informed me that although she has been  from her  for years, they are still . Strongly encouraged patient to complete health care POA to determine who would make medica decisions Debbiechely Richardson LSW in to consult     Central Line:  Mediport to right chest, site with no erythema, tenderness, edema, or drainage     Electrolyte disorders:  continues on oral magnesium and potassium, labs from yesterday show oral supplements effective    RTC: Infusion for port flush in monthly   Follow up visit with Dr. Mac in 3 months with blood draw. .-------------------------------------------------------------------------------------------------------  Stella Mac MD

## 2024-09-12 ENCOUNTER — PHARMACY VISIT (OUTPATIENT)
Dept: PHARMACY | Facility: CLINIC | Age: 89
End: 2024-09-12
Payer: COMMERCIAL

## 2024-09-17 ENCOUNTER — TELEPHONE (OUTPATIENT)
Dept: HEMATOLOGY/ONCOLOGY | Facility: HOSPITAL | Age: 89
End: 2024-09-17
Payer: MEDICARE

## 2024-09-17 NOTE — TELEPHONE ENCOUNTER
Pt had appointment today with Dr. Mac. 1st attempt call made by  was voicemail. 2nd attempt to son who is under the pt's contact list. Per pt's son, pt is in the hospital for UTI at [facility]. Pt also called back and stated she is at the hospital, and will call to reschedule appointments with Dr. Mac when she is discharged from the hospital.

## 2024-10-03 ENCOUNTER — APPOINTMENT (OUTPATIENT)
Dept: HEMATOLOGY/ONCOLOGY | Facility: HOSPITAL | Age: 89
End: 2024-10-03
Payer: MEDICARE

## 2024-10-04 ENCOUNTER — LAB (OUTPATIENT)
Dept: HEMATOLOGY/ONCOLOGY | Facility: CLINIC | Age: 89
End: 2024-10-04
Payer: MEDICARE

## 2024-10-04 VITALS
OXYGEN SATURATION: 99 % | SYSTOLIC BLOOD PRESSURE: 125 MMHG | RESPIRATION RATE: 16 BRPM | DIASTOLIC BLOOD PRESSURE: 66 MMHG | HEART RATE: 83 BPM | TEMPERATURE: 97.2 F

## 2024-10-04 DIAGNOSIS — C83.38 DIFFUSE LARGE B-CELL LYMPHOMA OF LYMPH NODES OF MULTIPLE REGIONS (MULTI): ICD-10-CM

## 2024-10-04 DIAGNOSIS — C83.30 DIFFUSE LARGE B-CELL LYMPHOMA, UNSPECIFIED SITE: ICD-10-CM

## 2024-10-04 LAB
BASOPHILS # BLD AUTO: 0.05 X10*3/UL (ref 0–0.1)
BASOPHILS NFR BLD AUTO: 0.6 %
EOSINOPHIL # BLD AUTO: 0.11 X10*3/UL (ref 0–0.4)
EOSINOPHIL NFR BLD AUTO: 1.4 %
ERYTHROCYTE [DISTWIDTH] IN BLOOD BY AUTOMATED COUNT: 12.5 % (ref 11.5–14.5)
HCT VFR BLD AUTO: 40 % (ref 36–46)
HGB BLD-MCNC: 13.6 G/DL (ref 12–16)
IMM GRANULOCYTES # BLD AUTO: 0.03 X10*3/UL (ref 0–0.5)
IMM GRANULOCYTES NFR BLD AUTO: 0.4 % (ref 0–0.9)
LYMPHOCYTES # BLD AUTO: 0.67 X10*3/UL (ref 0.8–3)
LYMPHOCYTES NFR BLD AUTO: 8.4 %
MCH RBC QN AUTO: 30.8 PG (ref 26–34)
MCHC RBC AUTO-ENTMCNC: 34 G/DL (ref 32–36)
MCV RBC AUTO: 91 FL (ref 80–100)
MONOCYTES # BLD AUTO: 2.19 X10*3/UL (ref 0.05–0.8)
MONOCYTES NFR BLD AUTO: 27.3 %
NEUTROPHILS # BLD AUTO: 4.97 X10*3/UL (ref 1.6–5.5)
NEUTROPHILS NFR BLD AUTO: 61.9 %
NRBC BLD-RTO: ABNORMAL /100{WBCS}
PLATELET # BLD AUTO: 297 X10*3/UL (ref 150–450)
RBC # BLD AUTO: 4.42 X10*6/UL (ref 4–5.2)
WBC # BLD AUTO: 8 X10*3/UL (ref 4.4–11.3)

## 2024-10-04 PROCEDURE — 80053 COMPREHEN METABOLIC PANEL: CPT

## 2024-10-04 PROCEDURE — 36591 DRAW BLOOD OFF VENOUS DEVICE: CPT

## 2024-10-04 PROCEDURE — 83615 LACTATE (LD) (LDH) ENZYME: CPT

## 2024-10-04 PROCEDURE — 83735 ASSAY OF MAGNESIUM: CPT

## 2024-10-04 PROCEDURE — 2500000004 HC RX 250 GENERAL PHARMACY W/ HCPCS (ALT 636 FOR OP/ED): Performed by: INTERNAL MEDICINE

## 2024-10-04 PROCEDURE — 85025 COMPLETE CBC W/AUTO DIFF WBC: CPT

## 2024-10-04 RX ORDER — HEPARIN 100 UNIT/ML
500 SYRINGE INTRAVENOUS AS NEEDED
Status: DISCONTINUED | OUTPATIENT
Start: 2024-10-04 | End: 2024-10-04 | Stop reason: HOSPADM

## 2024-10-04 RX ORDER — HEPARIN SODIUM,PORCINE/PF 10 UNIT/ML
50 SYRINGE (ML) INTRAVENOUS AS NEEDED
Status: DISCONTINUED | OUTPATIENT
Start: 2024-10-04 | End: 2024-10-04 | Stop reason: HOSPADM

## 2024-10-04 RX ORDER — HEPARIN 100 UNIT/ML
500 SYRINGE INTRAVENOUS AS NEEDED
OUTPATIENT
Start: 2024-10-04

## 2024-10-04 RX ORDER — HEPARIN SODIUM,PORCINE/PF 10 UNIT/ML
50 SYRINGE (ML) INTRAVENOUS AS NEEDED
OUTPATIENT
Start: 2024-10-04

## 2024-10-04 ASSESSMENT — PAIN SCALES - GENERAL: PAINLEVEL: 0-NO PAIN

## 2024-10-05 LAB
ALBUMIN SERPL BCP-MCNC: 3.8 G/DL (ref 3.4–5)
ALP SERPL-CCNC: 68 U/L (ref 33–136)
ALT SERPL W P-5'-P-CCNC: 22 U/L (ref 7–45)
ANION GAP SERPL CALC-SCNC: 16 MMOL/L (ref 10–20)
AST SERPL W P-5'-P-CCNC: 22 U/L (ref 9–39)
BILIRUB SERPL-MCNC: 0.7 MG/DL (ref 0–1.2)
BUN SERPL-MCNC: 14 MG/DL (ref 6–23)
CALCIUM SERPL-MCNC: 8.7 MG/DL (ref 8.6–10.6)
CHLORIDE SERPL-SCNC: 96 MMOL/L (ref 98–107)
CO2 SERPL-SCNC: 22 MMOL/L (ref 21–32)
CREAT SERPL-MCNC: 0.88 MG/DL (ref 0.5–1.05)
EGFRCR SERPLBLD CKD-EPI 2021: 64 ML/MIN/1.73M*2
GLUCOSE SERPL-MCNC: 84 MG/DL (ref 74–99)
LDH SERPL L TO P-CCNC: 129 U/L (ref 84–246)
MAGNESIUM SERPL-MCNC: 1.35 MG/DL (ref 1.6–2.4)
POTASSIUM SERPL-SCNC: 3.9 MMOL/L (ref 3.5–5.3)
PROT SERPL-MCNC: 5.8 G/DL (ref 6.4–8.2)
SODIUM SERPL-SCNC: 130 MMOL/L (ref 136–145)

## 2024-10-07 ASSESSMENT — ENCOUNTER SYMPTOMS
LIGHT-HEADEDNESS: 1
DIZZINESS: 1
FEVER: 0
UNEXPECTED WEIGHT CHANGE: 0
ADENOPATHY: 0
VOMITING: 0
WHEEZING: 0
APPETITE CHANGE: 1
CHILLS: 0
NAUSEA: 0
SHORTNESS OF BREATH: 0
NUMBNESS: 1
CONSTIPATION: 0
FATIGUE: 1

## 2024-10-08 ENCOUNTER — TELEPHONE (OUTPATIENT)
Dept: HEMATOLOGY/ONCOLOGY | Facility: HOSPITAL | Age: 89
End: 2024-10-08

## 2024-10-08 ENCOUNTER — OFFICE VISIT (OUTPATIENT)
Dept: HEMATOLOGY/ONCOLOGY | Facility: CLINIC | Age: 89
End: 2024-10-08
Payer: MEDICARE

## 2024-10-08 VITALS
OXYGEN SATURATION: 99 % | BODY MASS INDEX: 25.23 KG/M2 | RESPIRATION RATE: 16 BRPM | WEIGHT: 144.18 LBS | DIASTOLIC BLOOD PRESSURE: 68 MMHG | TEMPERATURE: 97.3 F | SYSTOLIC BLOOD PRESSURE: 125 MMHG | HEART RATE: 73 BPM

## 2024-10-08 DIAGNOSIS — C83.38 DIFFUSE LARGE B-CELL LYMPHOMA OF LYMPH NODES OF MULTIPLE REGIONS (MULTI): Primary | ICD-10-CM

## 2024-10-08 DIAGNOSIS — F41.9 ANXIETY: ICD-10-CM

## 2024-10-08 DIAGNOSIS — E83.42 HYPOMAGNESEMIA: ICD-10-CM

## 2024-10-08 DIAGNOSIS — Z95.828 PORT-A-CATH IN PLACE: ICD-10-CM

## 2024-10-08 DIAGNOSIS — E87.1 HYPONATREMIA: ICD-10-CM

## 2024-10-08 PROCEDURE — 1126F AMNT PAIN NOTED NONE PRSNT: CPT

## 2024-10-08 PROCEDURE — 3078F DIAST BP <80 MM HG: CPT

## 2024-10-08 PROCEDURE — 1159F MED LIST DOCD IN RCRD: CPT

## 2024-10-08 PROCEDURE — 99214 OFFICE O/P EST MOD 30 MIN: CPT

## 2024-10-08 PROCEDURE — 3074F SYST BP LT 130 MM HG: CPT

## 2024-10-08 PROCEDURE — 1160F RVW MEDS BY RX/DR IN RCRD: CPT

## 2024-10-08 PROCEDURE — 1157F ADVNC CARE PLAN IN RCRD: CPT

## 2024-10-08 ASSESSMENT — PAIN SCALES - GENERAL: PAINLEVEL: 0-NO PAIN

## 2024-10-08 ASSESSMENT — ENCOUNTER SYMPTOMS
LOSS OF SENSATION IN FEET: 0
CARDIOVASCULAR NEGATIVE: 1
NERVOUS/ANXIOUS: 1
BACK PAIN: 1
DIARRHEA: 1
COUGH: 1
DEPRESSION: 0
DIAPHORESIS: 1
OCCASIONAL FEELINGS OF UNSTEADINESS: 0

## 2024-10-08 NOTE — PATIENT INSTRUCTIONS
Obtain monthly port flushes.    Advised to follow up with primary care provider Dr. Coulter regarding lower sodium and magnesium levels to see if she needs to make adjustments to sodium chloride and magnesium oxide dosage.      Encouraged to schedule with gastrointestinal service.

## 2024-10-08 NOTE — PROGRESS NOTES
Patient ID: Simi Sandoval is a 89 y.o. female.    Treatment:   Oncology History Overview Note   1. 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 CT imaging was negative for cycle 2 of chemotherapy. The patient subsequently received treatment with 30 Gy radiation to the left breast completing August 2008.    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  Bulky recurrence documented 8/30/19 after presentation with hyponatremia. Bronchoscopy at [facility] on 8/30/19  showed diffuse large B cell lymphoma non germinal center cell with double expression of bcl2 and cmyc, but not double hit.   PET scan from [facility] system done 9/9/19 ordered by Dr. Ulices Leon with findings: lobulated mass in RUL 3.6 x 2.4 cm with SUV 36.8, hilar lymphadenopathy on right  with SUV 32.6, also uptake pleura and intercostal musculature. Abdominal findings include  several abnormal foci in liver , portacaval area with SUV of 4.5, uptake in T1 suspicious.   Initiation of R-gem cis 9/17/19, Completed cycle 5 of R-gem cis on 12/26/2019 with negative PET imaging on 1/30/2020.  Initiation of revlimid maintenance February 2020. discontinued due to poor tolerance  10/2020 Dx with recurrent lyphoma left chest wall  12/1/2020 Started Tafasetinib (Monjuvi) and Revlimid salvage therapy for DLBCL relapse acheived a complete clinical remission.    She completed treatment with Revlimid 5 mg days 1-21/28 days on 11/4/21.  Remains on Tafasitamab (Monjuvi) on days 1 Last dose of Monjuvi after PET imaging 5/8/23 was clear except for questionable  area right cervical level IVB lymph node with SUV of 5.1 due to patient's preference     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)    10/30/2023 Initial Diagnosis    Diffuse large B-cell lymphoma of lymph nodes of multiple regions (CMS/HCC)       Response:     Past Medical History:  1. History of hep C infection diagnosed 2008, not currently on therapy.   2. Diverticulitis May 2012 an admission for diverticulitis in July 2013.   3. Gastroesophageal reflux disease, irritable bowel syndrome.   4. atrial fibrillation, in 2013, and in the setting of hyperthyroidism.   5.  Right breast biopsy 3/22/2012 for abnormal calcifications- benign  6. Chronic hyponatremia    Surgical History:  Lumbar spince surgery January 2017     Family History:   No family history on file.    Social History:     Social History     Tobacco Use    Smoking status: Never     Passive exposure: Never    Smokeless tobacco: Never      -------------------------------------------------------------------------------------------------------  Subjective       HPI    Simi Sandoval is a 89 year old woman who had stage IA diffuse large B-cell lymphoma involving the left breast, initially diagnosed in 2007 with multiple recurrences, who initiated  (12/1/20) Tafasitinib (Monjuvi) and Revlimid salvage therapy for DLBCL relapse acheiving a complete clinical remission and is on tafasitinib maintenance every 2 weeks.      Most recent CT Staging for disease assessment (2/2/22) with no noncontrast CT evidence of intrathoracic or adb/pelvic lymphoproliferative process, usual fluid filled loops of bowel. Previously seen 3 mm left lower lobe pulmonary nodule is not conspicuous  on current imaging.     She completed treatment with Revlimid 5 mg days 1-21/28 days on 11/4/21.  Last dose of Monjuvi after PET imaging 5/8/23 was clear except for questionable  area right cervical level IVB lymph node with SUV of 5.1.  CT scan (8/18/23) SARAI.  Patient currently on a treatment holiday.  CT imaging done on 3/1/24  show no evidence of disease.     CT abdomen and pelvis showed distended bladder, CT brain  small vessel ischemic changes and lumbar spine showed prior surgery.      Simi Sandoval is a 89 year old patient who presents to the clinic today (10/8/24) for evaluation of her DLBCL in remission, was most recently treated with tafasitinib May 2023 and has been on observation.  Simi presents to the clinic today (10/8/24) unaccompanied for follow up evaluation of her DLBCL and blood work.    Since her last visit in June, she has had multiple ED visits and a hospital admission.  She presented to the ED on 8/16/24 for LLQ pain and diarrhea.  Hospital admission 9/16-9/23/24 at [facility] for hyponatremia, UTI, and side effects after starting duloxetine.  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  She called EMS and had ED evaluation on 9/28/24 for headache. CT head negative.  Was given rocephin and keflex for possible UTI.      Reports energy level is okay today, can be lower some days.  Has been active at home doing house work.  Uses cane for ambulation at times.  No recent falls.  Having PT and speech therapy once a week.  Appetite is poor at times and makes herself eat.  Has gained a few pounds back.  Started having night sweats over the last week after receiving the flu vaccine last Monday.  Has had diarrhea off and on all week but this is improving.  Has been using OTC imodium.  Intermittent dry cough.  Reports no shortness of breath.  Reports anxiety and was recently prescribed antianxiety medication buspirone 5 mg.  Was taking 3,000 mg tylenol for her back pain but was told to decrease.  Is now taking about 1,600 mg daily.  Is using norco as needed.  Uses lidocaine patches.  Reports intermittent light headedness and dizziness.  Is currently taking magnesium oxide 500 mg twice daily and sodium chloride 1 g twice daily.  Stopped spironolactone at hospital discharge.  Is following with PCP Dr. Coulter at [facility].  Port to right chest is doing well and was last flushed  on 10/4/24.      Review of Systems   Constitutional:  Positive for appetite change, diaphoresis and fatigue. Negative for chills, fever and unexpected weight change.   Respiratory:  Positive for cough. Negative for shortness of breath and wheezing.    Cardiovascular: Negative.    Gastrointestinal:  Positive for diarrhea. Negative for constipation, nausea and vomiting.   Genitourinary: Negative.     Musculoskeletal:  Positive for back pain and gait problem.   Skin: Negative.    Neurological:  Positive for dizziness, gait problem, light-headedness and numbness (left arm and hand).   Hematological:  Negative for adenopathy.   Psychiatric/Behavioral:  The patient is nervous/anxious.    -------------------------------------------------------------------------------------------------------  Objective   BSA: 1.71 meters squared  /68   Pulse 73   Temp 36.3 °C (97.3 °F) (Core)   Resp 16   Wt 65.4 kg (144 lb 2.9 oz)   SpO2 99%   BMI 25.23 kg/m²     Physical Exam  Vitals reviewed.   Constitutional:       Appearance: Normal appearance.   HENT:      Head: Normocephalic and atraumatic.      Mouth/Throat:      Mouth: Mucous membranes are moist.   Eyes:      Extraocular Movements: Extraocular movements intact.      Conjunctiva/sclera: Conjunctivae normal.      Pupils: Pupils are equal, round, and reactive to light.   Cardiovascular:      Rate and Rhythm: Normal rate and regular rhythm.      Pulses: Normal pulses.      Heart sounds: Normal heart sounds. No murmur heard.     No friction rub. No gallop.      Comments: Normal rate, irregular   Pulmonary:      Effort: Pulmonary effort is normal. No respiratory distress.      Breath sounds: Normal breath sounds. No wheezing.   Abdominal:      General: Abdomen is flat. Bowel sounds are normal. There is no distension.      Palpations: Abdomen is soft. There is no mass.      Tenderness: There is no abdominal tenderness.   Musculoskeletal:         General: Normal range of motion.       Right lower leg: Right lower leg edema: non-pitting.      Left lower leg: Left lower leg edema: non-pitting.   Lymphadenopathy:      Comments: No lymphadenopathy   Skin:     General: Skin is warm and dry.      Comments: Possible lipoma to left shoulder, soft/rubbery, non-tender.   Neurological:      General: No focal deficit present.      Mental Status: She is alert and oriented to person, place, and time. Mental status is at baseline.   Psychiatric:         Mood and Affect: Mood normal.         Behavior: Behavior normal.         Thought Content: Thought content normal.     Performance Status:  ECOG Performance Status: 1 restricted from physically strenuous work  -------------------------------------------------------------------------------------------------------  Assessment/Plan      DLBCL Third recurrence October 2020 extensive recurrence in left pleural as well as other sites.        Initiated Tafasitamab (Monjuvi) 12 g/kg IV  in combination with Revlimid on 12/1/20.   Monjuvi will be given on day 1,4,8,15, 22 of 28 day cycle for first cycle, then weekly (days 1,8,15, & 22) for cycles 2,3, then cycles 4 and beyond on days 1 & 15/28  day cycles.  Acheived CR     PET imaging (5/8/23) - interval complete resolution of hypermetabolic activity involving a peripancreatic lymph node.  Additionally, the previously described periaortic lymph node has also demonstrated complete interval resolution of hypermetabolic activity that was present in previous imaging on 2/1/23 but now describes a new right level IVB lymph node demonstrating  intense hypermetabolic activity with maximum SUV of 5.1      Completed 33 cycles of Tafasitamab (Monjuvi) scheduled on May 16 2023 and requested treatment holiday.   Disease surveillance 3/1/24 SARAI!    In the future bite molecules would be an option.        6/11/24:  CBC results stable.  No concerning findings on physical examination.  Had CT abdomen and pelvis (8/16/24) at [facility] which  shows no evidence of disease.  May consider reimaging CT neck, CAP in future.  Follow up visit in 3 months.       Pain management -  No longer seeing pall med, currently using acetaminophen, hydrocodone-acetaminophen, and lidocaine patches, restless leg med.     3. History of hepatitis C. The patient has been followed by Dr. Boby Hale in  past and now followed by her PCP Dr. Coulter at [facility], unclear what tests have been done. LFTs have been normal.     4. Atrial fibrillation and hypertension:  Patent has had regular followup with cardiologist  at [facility] ( Dr. Paul Harris). Continues Metoprolol with rate control.  Also on Eliquis 5mg BID & Amlodipine.     Hyponatremia and Hypomagnesemia:  10/4/24:  Sodium level decreased to 130 and magnesium 1.35.  Patient asymptomatic.  Reports that her spirolactone was recently discontinued around the end of September.  Was advised to change sodium chloride 1,000 mg to BID, was previously on TID.  Continues magnesium oxide 400 mg BID, was previously taking TID.  Advised Siim to increase sodium chloride 1,000 mg back to TID and magnesium oxide 400 mg TID.  Recommended Simi contact PCP Dr. Coulter to update on recent blood work and to make sure he is okay with her increasing sodium chloride and magnesium oxide.  Simi reports she plans to contact Dr. Coulter and obtain blood work that he recently ordered.        5. Mild memory loss-  seems to be functioning pretty well  not clear whether she has seen geriatrics.     6. Health maintenance- Prevnar 13 vaccine updated  10/29/16, has had flu shot 2019.  Mammogram completed 6/2019.  Has completed COVID vaccines and booster and Evusheld April 2022.  Received high dose influenza vaccine 9/30/24.  Advised updated covid, shingrix, and pneumonia vaccines at local pharmacy.       7. Psychosocial- anxiety and depression as above.  -uses prn lorazepam for anxiety     8.  Goals of Care: last confirmed 10/31/23 that the patient does not want to  be resuscitated or be placed on life support systems.  She would want  afib addressed. Patient informed me that although she has been  from her  for years, they are still . Strongly encouraged patient to complete health care POA to determine who would make medica decisions Debbie RIVERAW in to consult.    Central Line:  Mediport to right chest, site with no erythema, tenderness, edema, or drainage.  Simi requesting to keep port at this time.  Placed orders for monthly port flush.    RTC:   Advised to reach out to PCP Dr. Coulter regarding sodium chloride and magnesium oxide dosages and to have scheduled blood work to monitor sodium and magnesium levels.    Infusion visit for monthly port flushes  Follow up visit with provider in 3 months with blood draw. -------------------------------------------------------------------------------------------------------  CORNELIUS Fields-RASHAAD

## 2024-10-11 ENCOUNTER — SPECIALTY PHARMACY (OUTPATIENT)
Dept: PHARMACY | Facility: CLINIC | Age: 89
End: 2024-10-11

## 2024-10-11 PROBLEM — Z95.828 PORT-A-CATH IN PLACE: Status: ACTIVE | Noted: 2024-10-11

## 2024-10-11 PROCEDURE — RXMED WILLOW AMBULATORY MEDICATION CHARGE

## 2024-10-15 ENCOUNTER — PHARMACY VISIT (OUTPATIENT)
Dept: PHARMACY | Facility: CLINIC | Age: 89
End: 2024-10-15
Payer: COMMERCIAL

## 2024-10-17 ENCOUNTER — OFFICE VISIT (OUTPATIENT)
Dept: URGENT CARE | Age: 89
End: 2024-10-17
Payer: MEDICARE

## 2024-10-17 VITALS
TEMPERATURE: 97.9 F | OXYGEN SATURATION: 95 % | RESPIRATION RATE: 18 BRPM | HEART RATE: 82 BPM | DIASTOLIC BLOOD PRESSURE: 83 MMHG | SYSTOLIC BLOOD PRESSURE: 169 MMHG

## 2024-10-17 DIAGNOSIS — R10.9 ABDOMINAL PAIN, UNSPECIFIED ABDOMINAL LOCATION: ICD-10-CM

## 2024-10-17 DIAGNOSIS — R19.7 DIARRHEA, UNSPECIFIED TYPE: Primary | ICD-10-CM

## 2024-10-17 LAB
POC APPEARANCE, URINE: CLEAR
POC BILIRUBIN, URINE: NEGATIVE
POC BLOOD, URINE: NEGATIVE
POC COLOR, URINE: YELLOW
POC GLUCOSE, URINE: NEGATIVE MG/DL
POC KETONES, URINE: NEGATIVE MG/DL
POC LEUKOCYTES, URINE: NEGATIVE
POC NITRITE,URINE: NEGATIVE
POC PH, URINE: 6 PH
POC PROTEIN, URINE: NEGATIVE MG/DL
POC SPECIFIC GRAVITY, URINE: <=1.005
POC UROBILINOGEN, URINE: 0.2 EU/DL

## 2024-10-17 NOTE — PROGRESS NOTES
HPI:  Patient states that for the past 2 weeks she had constipation and diarrhea alternating, but for the past few days it was mainly watery diarrhea.  Pt states that she had some abdominal cramps when she was constipated, but after diarrhea abdominal cramps resolved.  No n/v.  No fever/chills.  No fatigue.  No cp or sob. No blood or mucus in stool.  Pt states that she took Pepto and diarrhea has decreased today.  Pt states that she has no frequency, urgency or dysuria.  No unusual food.  No travel.  Was treated for UTI a month ago.          ROS:  No fever/chills  No n/v  No cp  No sob  No fatigue  No muscle cramps  No dizziness    PE:    A&O x3  NCAT  No conjunctival erythema  Sinus rhythm  CTAB  Abd:soft, nd, nt,+bs  no cva tndop, no suprapubic tndop  MOEx4  No focal deficit  Judgement normal    Results:  UA: no acute findings    A/P:   Diarrhea  AP    Increase potassium in diet.  You can take 2 doses of Imodium.  Increase fluids.  Avoid spicy/fatty/acidic food.  Keep a diary of symptoms.  Recheck with your doctor in 2 days if no improvement and discuss stool cultures including C.diff considering antibiotic use a month ago.  Go to the ER if starts getting worse.

## 2024-11-17 RX ORDER — HEPARIN SODIUM,PORCINE/PF 10 UNIT/ML
50 SYRINGE (ML) INTRAVENOUS AS NEEDED
OUTPATIENT
Start: 2024-11-17

## 2024-11-17 RX ORDER — HEPARIN 100 UNIT/ML
500 SYRINGE INTRAVENOUS AS NEEDED
OUTPATIENT
Start: 2024-11-17

## 2024-12-24 ENCOUNTER — SPECIALTY PHARMACY (OUTPATIENT)
Dept: PHARMACY | Facility: CLINIC | Age: 89
End: 2024-12-24

## 2024-12-27 ENCOUNTER — PHARMACY VISIT (OUTPATIENT)
Dept: PHARMACY | Facility: CLINIC | Age: 89
End: 2024-12-27
Payer: COMMERCIAL

## 2024-12-27 PROCEDURE — RXMED WILLOW AMBULATORY MEDICATION CHARGE

## 2025-01-07 ENCOUNTER — APPOINTMENT (OUTPATIENT)
Dept: HEMATOLOGY/ONCOLOGY | Facility: CLINIC | Age: 89
End: 2025-01-07
Payer: MEDICARE

## 2025-01-07 ENCOUNTER — TELEPHONE (OUTPATIENT)
Dept: HEMATOLOGY/ONCOLOGY | Facility: CLINIC | Age: 89
End: 2025-01-07

## 2025-01-08 ENCOUNTER — TELEPHONE (OUTPATIENT)
Dept: HEMATOLOGY/ONCOLOGY | Facility: HOSPITAL | Age: 89
End: 2025-01-08

## 2025-01-08 ENCOUNTER — APPOINTMENT (OUTPATIENT)
Dept: RADIOLOGY | Facility: HOSPITAL | Age: 89
DRG: 840 | End: 2025-01-08
Payer: MEDICARE

## 2025-01-08 ENCOUNTER — HOSPITAL ENCOUNTER (INPATIENT)
Facility: HOSPITAL | Age: 89
End: 2025-01-08
Attending: INTERNAL MEDICINE
Payer: MEDICARE

## 2025-01-08 DIAGNOSIS — R60.0 EDEMA OF LEFT LOWER EXTREMITY: ICD-10-CM

## 2025-01-08 DIAGNOSIS — I13.10 HYPERTENSIVE HEART AND CHRONIC KIDNEY DISEASE WITHOUT HEART FAILURE, WITH STAGE 1 THROUGH STAGE 4 CHRONIC KIDNEY DISEASE, OR UNSPECIFIED CHRONIC KIDNEY DISEASE: ICD-10-CM

## 2025-01-08 DIAGNOSIS — C83.38 DIFFUSE LARGE B-CELL LYMPHOMA OF LYMPH NODES OF MULTIPLE REGIONS (MULTI): ICD-10-CM

## 2025-01-08 DIAGNOSIS — I48.91 ATRIAL FIBRILLATION WITH RVR (MULTI): ICD-10-CM

## 2025-01-08 DIAGNOSIS — C83.30 DLBCL (DIFFUSE LARGE B CELL LYMPHOMA): Primary | ICD-10-CM

## 2025-01-08 LAB
ALBUMIN SERPL BCP-MCNC: 2.7 G/DL (ref 3.4–5)
ALP SERPL-CCNC: 60 U/L (ref 33–136)
ALT SERPL W P-5'-P-CCNC: 10 U/L (ref 7–45)
ANION GAP SERPL CALC-SCNC: 12 MMOL/L (ref 10–20)
AST SERPL W P-5'-P-CCNC: 11 U/L (ref 9–39)
BASOPHILS # BLD AUTO: 0 X10*3/UL (ref 0–0.1)
BASOPHILS NFR BLD AUTO: 0 %
BILIRUB SERPL-MCNC: 0.3 MG/DL (ref 0–1.2)
BUN SERPL-MCNC: 21 MG/DL (ref 6–23)
CALCIUM SERPL-MCNC: 12.6 MG/DL (ref 8.6–10.6)
CHLORIDE SERPL-SCNC: 99 MMOL/L (ref 98–107)
CO2 SERPL-SCNC: 25 MMOL/L (ref 21–32)
CREAT SERPL-MCNC: 1.01 MG/DL (ref 0.5–1.05)
EGFRCR SERPLBLD CKD-EPI 2021: 54 ML/MIN/1.73M*2
EOSINOPHIL # BLD AUTO: 0 X10*3/UL (ref 0–0.4)
EOSINOPHIL NFR BLD AUTO: 0 %
ERYTHROCYTE [DISTWIDTH] IN BLOOD BY AUTOMATED COUNT: 14.3 % (ref 11.5–14.5)
GLUCOSE SERPL-MCNC: 182 MG/DL (ref 74–99)
HCT VFR BLD AUTO: 27 % (ref 36–46)
HGB BLD-MCNC: 10 G/DL (ref 12–16)
IMM GRANULOCYTES # BLD AUTO: 0.03 X10*3/UL (ref 0–0.5)
IMM GRANULOCYTES NFR BLD AUTO: 1.2 % (ref 0–0.9)
LDH SERPL L TO P-CCNC: 271 U/L (ref 84–246)
LYMPHOCYTES # BLD AUTO: 0.01 X10*3/UL (ref 0.8–3)
LYMPHOCYTES NFR BLD AUTO: 0.4 %
MAGNESIUM SERPL-MCNC: 1.74 MG/DL (ref 1.6–2.4)
MCH RBC QN AUTO: 35.7 PG (ref 26–34)
MCHC RBC AUTO-ENTMCNC: 37 G/DL (ref 32–36)
MCV RBC AUTO: 96 FL (ref 80–100)
MONOCYTES # BLD AUTO: 0.17 X10*3/UL (ref 0.05–0.8)
MONOCYTES NFR BLD AUTO: 6.7 %
NEUTROPHILS # BLD AUTO: 2.33 X10*3/UL (ref 1.6–5.5)
NEUTROPHILS NFR BLD AUTO: 91.7 %
NRBC BLD-RTO: 0 /100 WBCS (ref 0–0)
PLATELET # BLD AUTO: 400 X10*3/UL (ref 150–450)
POTASSIUM SERPL-SCNC: 3.8 MMOL/L (ref 3.5–5.3)
PROT SERPL-MCNC: 5 G/DL (ref 6.4–8.2)
RBC # BLD AUTO: 2.8 X10*6/UL (ref 4–5.2)
SODIUM SERPL-SCNC: 132 MMOL/L (ref 136–145)
TSH SERPL-ACNC: 3.38 MIU/L (ref 0.44–3.98)
URATE SERPL-MCNC: 6.6 MG/DL (ref 2.3–6.7)
WBC # BLD AUTO: 25.4 X10*3/UL (ref 4.4–11.3)

## 2025-01-08 PROCEDURE — 84443 ASSAY THYROID STIM HORMONE: CPT

## 2025-01-08 PROCEDURE — 71045 X-RAY EXAM CHEST 1 VIEW: CPT | Performed by: RADIOLOGY

## 2025-01-08 PROCEDURE — 86901 BLOOD TYPING SEROLOGIC RH(D): CPT

## 2025-01-08 PROCEDURE — 83615 LACTATE (LD) (LDH) ENZYME: CPT

## 2025-01-08 PROCEDURE — 83735 ASSAY OF MAGNESIUM: CPT

## 2025-01-08 PROCEDURE — 99223 1ST HOSP IP/OBS HIGH 75: CPT

## 2025-01-08 PROCEDURE — 2500000004 HC RX 250 GENERAL PHARMACY W/ HCPCS (ALT 636 FOR OP/ED)

## 2025-01-08 PROCEDURE — 84075 ASSAY ALKALINE PHOSPHATASE: CPT

## 2025-01-08 PROCEDURE — 1170000001 HC PRIVATE ONCOLOGY ROOM DAILY

## 2025-01-08 PROCEDURE — 85025 COMPLETE CBC W/AUTO DIFF WBC: CPT

## 2025-01-08 PROCEDURE — 2500000001 HC RX 250 WO HCPCS SELF ADMINISTERED DRUGS (ALT 637 FOR MEDICARE OP)

## 2025-01-08 PROCEDURE — 84550 ASSAY OF BLOOD/URIC ACID: CPT

## 2025-01-08 PROCEDURE — 2500000005 HC RX 250 GENERAL PHARMACY W/O HCPCS

## 2025-01-08 PROCEDURE — 71045 X-RAY EXAM CHEST 1 VIEW: CPT

## 2025-01-08 PROCEDURE — 2500000002 HC RX 250 W HCPCS SELF ADMINISTERED DRUGS (ALT 637 FOR MEDICARE OP, ALT 636 FOR OP/ED)

## 2025-01-08 RX ORDER — METHOCARBAMOL 500 MG/1
500 TABLET, FILM COATED ORAL EVERY 8 HOURS PRN
Status: DISPENSED | OUTPATIENT
Start: 2025-01-08

## 2025-01-08 RX ORDER — BUSPIRONE HYDROCHLORIDE 15 MG/1
7.5 TABLET ORAL 3 TIMES DAILY
Status: DISPENSED | OUTPATIENT
Start: 2025-01-09

## 2025-01-08 RX ORDER — DEXAMETHASONE 4 MG/1
4 TABLET ORAL EVERY 12 HOURS SCHEDULED
Status: DISCONTINUED | OUTPATIENT
Start: 2025-01-08 | End: 2025-01-10

## 2025-01-08 RX ORDER — DULOXETIN HYDROCHLORIDE 20 MG/1
20 CAPSULE, DELAYED RELEASE ORAL DAILY
Status: DISPENSED | OUTPATIENT
Start: 2025-01-09

## 2025-01-08 RX ORDER — LIDOCAINE 560 MG/1
1 PATCH PERCUTANEOUS; TOPICAL; TRANSDERMAL DAILY
Status: DISPENSED | OUTPATIENT
Start: 2025-01-08

## 2025-01-08 RX ORDER — OXYCODONE HYDROCHLORIDE 5 MG/1
5 TABLET ORAL EVERY 6 HOURS PRN
Status: DISCONTINUED | OUTPATIENT
Start: 2025-01-08 | End: 2025-01-11

## 2025-01-08 RX ORDER — TALC
6 POWDER (GRAM) TOPICAL NIGHTLY PRN
Status: DISPENSED | OUTPATIENT
Start: 2025-01-08

## 2025-01-08 RX ORDER — METOPROLOL SUCCINATE 50 MG/1
100 TABLET, EXTENDED RELEASE ORAL DAILY
Status: DISPENSED | OUTPATIENT
Start: 2025-01-09

## 2025-01-08 RX ORDER — ROPINIROLE 0.5 MG/1
0.5 TABLET, FILM COATED ORAL NIGHTLY
Status: DISPENSED | OUTPATIENT
Start: 2025-01-08

## 2025-01-08 RX ORDER — METHIMAZOLE 5 MG/1
2.5 TABLET ORAL DAILY
Status: DISPENSED | OUTPATIENT
Start: 2025-01-09

## 2025-01-08 RX ORDER — LANOLIN ALCOHOL/MO/W.PET/CERES
400 CREAM (GRAM) TOPICAL
Status: DISPENSED | OUTPATIENT
Start: 2025-01-08

## 2025-01-08 RX ORDER — PANTOPRAZOLE SODIUM 40 MG/1
40 TABLET, DELAYED RELEASE ORAL
Status: DISPENSED | OUTPATIENT
Start: 2025-01-09

## 2025-01-08 RX ORDER — AMLODIPINE BESYLATE 5 MG/1
5 TABLET ORAL DAILY
Status: DISPENSED | OUTPATIENT
Start: 2025-01-09

## 2025-01-08 RX ORDER — FAMOTIDINE 20 MG/1
20 TABLET, FILM COATED ORAL NIGHTLY
Status: DISCONTINUED | OUTPATIENT
Start: 2025-01-08 | End: 2025-01-08

## 2025-01-08 RX ORDER — AMIODARONE HYDROCHLORIDE 200 MG/1
200 TABLET ORAL DAILY
Status: DISPENSED | OUTPATIENT
Start: 2025-01-09

## 2025-01-08 RX ORDER — TALC
3 POWDER (GRAM) TOPICAL NIGHTLY PRN
Status: DISCONTINUED | OUTPATIENT
Start: 2025-01-08 | End: 2025-01-08

## 2025-01-08 RX ORDER — SODIUM CHLORIDE 9 MG/ML
75 INJECTION, SOLUTION INTRAVENOUS CONTINUOUS
Status: DISCONTINUED | OUTPATIENT
Start: 2025-01-08 | End: 2025-01-09

## 2025-01-08 RX ORDER — IPRATROPIUM BROMIDE AND ALBUTEROL SULFATE 2.5; .5 MG/3ML; MG/3ML
3 SOLUTION RESPIRATORY (INHALATION) EVERY 6 HOURS PRN
Status: ACTIVE | OUTPATIENT
Start: 2025-01-08

## 2025-01-08 RX ORDER — VANCOMYCIN HYDROCHLORIDE 125 MG/1
125 CAPSULE ORAL 4 TIMES DAILY
Status: DISPENSED | OUTPATIENT
Start: 2025-01-08 | End: 2025-01-13

## 2025-01-08 RX ADMIN — VANCOMYCIN HYDROCHLORIDE 125 MG: 125 CAPSULE ORAL at 21:56

## 2025-01-08 RX ADMIN — ROPINIROLE 0.5 MG: 0.5 TABLET, FILM COATED ORAL at 21:56

## 2025-01-08 RX ADMIN — MAGNESIUM OXIDE TAB 400 MG (241.3 MG ELEMENTAL MG) 400 MG: 400 (241.3 MG) TAB at 21:56

## 2025-01-08 RX ADMIN — APIXABAN 5 MG: 5 TABLET, FILM COATED ORAL at 21:56

## 2025-01-08 RX ADMIN — Medication 6 MG: at 21:56

## 2025-01-08 RX ADMIN — OXYCODONE 5 MG: 5 TABLET ORAL at 22:26

## 2025-01-08 RX ADMIN — Medication 15 ML: at 21:56

## 2025-01-08 RX ADMIN — DEXAMETHASONE 4 MG: 4 TABLET ORAL at 21:56

## 2025-01-08 RX ADMIN — LIDOCAINE 1 PATCH: 4 PATCH TOPICAL at 21:56

## 2025-01-08 SDOH — HEALTH STABILITY: PHYSICAL HEALTH: ON AVERAGE, HOW MANY DAYS PER WEEK DO YOU ENGAGE IN MODERATE TO STRENUOUS EXERCISE (LIKE A BRISK WALK)?: 1 DAY

## 2025-01-08 SDOH — SOCIAL STABILITY: SOCIAL INSECURITY
WITHIN THE LAST YEAR, HAVE YOU BEEN KICKED, HIT, SLAPPED, OR OTHERWISE PHYSICALLY HURT BY YOUR PARTNER OR EX-PARTNER?: NO

## 2025-01-08 SDOH — SOCIAL STABILITY: SOCIAL NETWORK: IN A TYPICAL WEEK, HOW MANY TIMES DO YOU TALK ON THE PHONE WITH FAMILY, FRIENDS, OR NEIGHBORS?: TWICE A WEEK

## 2025-01-08 SDOH — SOCIAL STABILITY: SOCIAL INSECURITY
WITHIN THE LAST YEAR, HAVE YOU BEEN RAPED OR FORCED TO HAVE ANY KIND OF SEXUAL ACTIVITY BY YOUR PARTNER OR EX-PARTNER?: NO

## 2025-01-08 SDOH — SOCIAL STABILITY: SOCIAL INSECURITY: HAVE YOU HAD ANY THOUGHTS OF HARMING ANYONE ELSE?: NO

## 2025-01-08 SDOH — ECONOMIC STABILITY: INCOME INSECURITY: IN THE PAST 12 MONTHS HAS THE ELECTRIC, GAS, OIL, OR WATER COMPANY THREATENED TO SHUT OFF SERVICES IN YOUR HOME?: NO

## 2025-01-08 SDOH — ECONOMIC STABILITY: FOOD INSECURITY
WITHIN THE PAST 12 MONTHS, YOU WORRIED THAT YOUR FOOD WOULD RUN OUT BEFORE YOU GOT THE MONEY TO BUY MORE.: SOMETIMES TRUE

## 2025-01-08 SDOH — SOCIAL STABILITY: SOCIAL INSECURITY: ABUSE: ADULT

## 2025-01-08 SDOH — HEALTH STABILITY: MENTAL HEALTH: HOW MANY DRINKS CONTAINING ALCOHOL DO YOU HAVE ON A TYPICAL DAY WHEN YOU ARE DRINKING?: PATIENT DOES NOT DRINK

## 2025-01-08 SDOH — SOCIAL STABILITY: SOCIAL INSECURITY: POSSIBLE ABUSE REPORTED TO:: OTHER (COMMENT)

## 2025-01-08 SDOH — HEALTH STABILITY: PHYSICAL HEALTH
HOW OFTEN DO YOU NEED TO HAVE SOMEONE HELP YOU WHEN YOU READ INSTRUCTIONS, PAMPHLETS, OR OTHER WRITTEN MATERIAL FROM YOUR DOCTOR OR PHARMACY?: RARELY

## 2025-01-08 SDOH — SOCIAL STABILITY: SOCIAL NETWORK: HOW OFTEN DO YOU GET TOGETHER WITH FRIENDS OR RELATIVES?: TWICE A WEEK

## 2025-01-08 SDOH — SOCIAL STABILITY: SOCIAL INSECURITY: HAVE YOU HAD THOUGHTS OF HARMING ANYONE ELSE?: NO

## 2025-01-08 SDOH — SOCIAL STABILITY: SOCIAL INSECURITY: DO YOU FEEL ANYONE HAS EXPLOITED OR TAKEN ADVANTAGE OF YOU FINANCIALLY OR OF YOUR PERSONAL PROPERTY?: NO

## 2025-01-08 SDOH — ECONOMIC STABILITY: FOOD INSECURITY: WITHIN THE PAST 12 MONTHS, THE FOOD YOU BOUGHT JUST DIDN'T LAST AND YOU DIDN'T HAVE MONEY TO GET MORE.: SOMETIMES TRUE

## 2025-01-08 SDOH — SOCIAL STABILITY: SOCIAL INSECURITY: WITHIN THE LAST YEAR, HAVE YOU BEEN HUMILIATED OR EMOTIONALLY ABUSED IN OTHER WAYS BY YOUR PARTNER OR EX-PARTNER?: NO

## 2025-01-08 SDOH — SOCIAL STABILITY: SOCIAL INSECURITY: WITHIN THE LAST YEAR, HAVE YOU BEEN AFRAID OF YOUR PARTNER OR EX-PARTNER?: NO

## 2025-01-08 SDOH — SOCIAL STABILITY: SOCIAL NETWORK
DO YOU BELONG TO ANY CLUBS OR ORGANIZATIONS SUCH AS CHURCH GROUPS, UNIONS, FRATERNAL OR ATHLETIC GROUPS, OR SCHOOL GROUPS?: NO

## 2025-01-08 SDOH — SOCIAL STABILITY: SOCIAL INSECURITY: DOES ANYONE TRY TO KEEP YOU FROM HAVING/CONTACTING OTHER FRIENDS OR DOING THINGS OUTSIDE YOUR HOME?: NO

## 2025-01-08 SDOH — SOCIAL STABILITY: SOCIAL INSECURITY: HAS ANYONE EVER THREATENED TO HURT YOUR FAMILY OR YOUR PETS?: NO

## 2025-01-08 SDOH — HEALTH STABILITY: MENTAL HEALTH
DO YOU FEEL STRESS - TENSE, RESTLESS, NERVOUS, OR ANXIOUS, OR UNABLE TO SLEEP AT NIGHT BECAUSE YOUR MIND IS TROUBLED ALL THE TIME - THESE DAYS?: TO SOME EXTENT

## 2025-01-08 SDOH — HEALTH STABILITY: MENTAL HEALTH: HOW OFTEN DO YOU HAVE SIX OR MORE DRINKS ON ONE OCCASION?: NEVER

## 2025-01-08 SDOH — SOCIAL STABILITY: SOCIAL INSECURITY: ARE YOU MARRIED, WIDOWED, DIVORCED, SEPARATED, NEVER MARRIED, OR LIVING WITH A PARTNER?: SEPARATED

## 2025-01-08 SDOH — SOCIAL STABILITY: SOCIAL INSECURITY: ARE THERE ANY APPARENT SIGNS OF INJURIES/BEHAVIORS THAT COULD BE RELATED TO ABUSE/NEGLECT?: NO

## 2025-01-08 SDOH — HEALTH STABILITY: MENTAL HEALTH: EXPERIENCED ANY OF THE FOLLOWING LIFE EVENTS: OTHER (COMMENT)

## 2025-01-08 SDOH — HEALTH STABILITY: PHYSICAL HEALTH: ON AVERAGE, HOW MANY MINUTES DO YOU ENGAGE IN EXERCISE AT THIS LEVEL?: 10 MIN

## 2025-01-08 SDOH — HEALTH STABILITY: MENTAL HEALTH: HOW OFTEN DO YOU HAVE A DRINK CONTAINING ALCOHOL?: NEVER

## 2025-01-08 SDOH — SOCIAL STABILITY: SOCIAL INSECURITY: ARE YOU OR HAVE YOU BEEN THREATENED OR ABUSED PHYSICALLY, EMOTIONALLY, OR SEXUALLY BY ANYONE?: NO

## 2025-01-08 SDOH — SOCIAL STABILITY: SOCIAL INSECURITY: DO YOU FEEL UNSAFE GOING BACK TO THE PLACE WHERE YOU ARE LIVING?: NO

## 2025-01-08 SDOH — SOCIAL STABILITY: SOCIAL NETWORK: HOW OFTEN DO YOU ATTEND MEETINGS OF THE CLUBS OR ORGANIZATIONS YOU BELONG TO?: NEVER

## 2025-01-08 SDOH — SOCIAL STABILITY: SOCIAL INSECURITY: WERE YOU ABLE TO COMPLETE ALL THE BEHAVIORAL HEALTH SCREENINGS?: YES

## 2025-01-08 SDOH — SOCIAL STABILITY: SOCIAL NETWORK: HOW OFTEN DO YOU ATTEND CHURCH OR RELIGIOUS SERVICES?: MORE THAN 4 TIMES PER YEAR

## 2025-01-08 ASSESSMENT — COGNITIVE AND FUNCTIONAL STATUS - GENERAL
WALKING IN HOSPITAL ROOM: A LITTLE
DRESSING REGULAR UPPER BODY CLOTHING: A LITTLE
HELP NEEDED FOR BATHING: A LOT
DAILY ACTIVITIY SCORE: 16
PATIENT BASELINE BEDBOUND: NO
STANDING UP FROM CHAIR USING ARMS: A LOT
DRESSING REGULAR LOWER BODY CLOTHING: A LOT
MOVING TO AND FROM BED TO CHAIR: A LITTLE
EATING MEALS: A LITTLE
MOBILITY SCORE: 17
MOVING FROM LYING ON BACK TO SITTING ON SIDE OF FLAT BED WITH BEDRAILS: A LITTLE
TOILETING: A LITTLE
PERSONAL GROOMING: A LITTLE
TURNING FROM BACK TO SIDE WHILE IN FLAT BAD: A LITTLE
CLIMB 3 TO 5 STEPS WITH RAILING: A LITTLE

## 2025-01-08 ASSESSMENT — PATIENT HEALTH QUESTIONNAIRE - PHQ9
1. LITTLE INTEREST OR PLEASURE IN DOING THINGS: SEVERAL DAYS
SUM OF ALL RESPONSES TO PHQ9 QUESTIONS 1 & 2: 1
2. FEELING DOWN, DEPRESSED OR HOPELESS: SEVERAL DAYS
SUM OF ALL RESPONSES TO PHQ9 QUESTIONS 1 & 2: 2
2. FEELING DOWN, DEPRESSED OR HOPELESS: SEVERAL DAYS
1. LITTLE INTEREST OR PLEASURE IN DOING THINGS: NOT AT ALL

## 2025-01-08 ASSESSMENT — ACTIVITIES OF DAILY LIVING (ADL)
ADEQUATE_TO_COMPLETE_ADL: YES
LACK_OF_TRANSPORTATION: NO
BATHING: INDEPENDENT
DRESSING YOURSELF: INDEPENDENT
JUDGMENT_ADEQUATE_SAFELY_COMPLETE_DAILY_ACTIVITIES: YES
PATIENT'S MEMORY ADEQUATE TO SAFELY COMPLETE DAILY ACTIVITIES?: YES
TOILETING: INDEPENDENT
GROOMING: INDEPENDENT
FEEDING YOURSELF: INDEPENDENT
HEARING - RIGHT EAR: HEARING AID
HEARING - LEFT EAR: HEARING AID
WALKS IN HOME: INDEPENDENT
ASSISTIVE_DEVICE: EYEGLASSES;CANE

## 2025-01-08 ASSESSMENT — LIFESTYLE VARIABLES
SKIP TO QUESTIONS 9-10: 1
PRESCIPTION_ABUSE_PAST_12_MONTHS: NO
AUDIT-C TOTAL SCORE: 0
HOW MANY STANDARD DRINKS CONTAINING ALCOHOL DO YOU HAVE ON A TYPICAL DAY: PATIENT DOES NOT DRINK
HOW OFTEN DO YOU HAVE 6 OR MORE DRINKS ON ONE OCCASION: NEVER
SUBSTANCE_ABUSE_PAST_12_MONTHS: NO
SKIP TO QUESTIONS 9-10: 1
PRESCIPTION_ABUSE_PAST_12_MONTHS: NO
AUDIT-C TOTAL SCORE: 0
HOW OFTEN DO YOU HAVE A DRINK CONTAINING ALCOHOL: NEVER
AUDIT-C TOTAL SCORE: 0
SUBSTANCE_ABUSE_PAST_12_MONTHS: NO

## 2025-01-08 ASSESSMENT — PAIN SCALES - WONG BAKER
WONGBAKER_NUMERICALRESPONSE: HURTS WORST
WONGBAKER_NUMERICALRESPONSE: HURTS LITTLE BIT

## 2025-01-08 ASSESSMENT — PAIN SCALES - GENERAL
PAINLEVEL_OUTOF10: 9
PAINLEVEL_OUTOF10: 10 - WORST POSSIBLE PAIN

## 2025-01-08 ASSESSMENT — COLUMBIA-SUICIDE SEVERITY RATING SCALE - C-SSRS
6. HAVE YOU EVER DONE ANYTHING, STARTED TO DO ANYTHING, OR PREPARED TO DO ANYTHING TO END YOUR LIFE?: NO
2. HAVE YOU ACTUALLY HAD ANY THOUGHTS OF KILLING YOURSELF?: NO
1. IN THE PAST MONTH, HAVE YOU WISHED YOU WERE DEAD OR WISHED YOU COULD GO TO SLEEP AND NOT WAKE UP?: NO

## 2025-01-08 ASSESSMENT — PAIN DESCRIPTION - ORIENTATION: ORIENTATION: LEFT

## 2025-01-08 ASSESSMENT — PAIN DESCRIPTION - LOCATION: LOCATION: LEG

## 2025-01-09 ENCOUNTER — APPOINTMENT (OUTPATIENT)
Dept: RADIOLOGY | Facility: HOSPITAL | Age: 89
DRG: 840 | End: 2025-01-09
Payer: MEDICARE

## 2025-01-09 ENCOUNTER — OFFICE VISIT (OUTPATIENT)
Dept: HEMATOLOGY/ONCOLOGY | Facility: HOSPITAL | Age: 89
DRG: 840 | End: 2025-01-09
Payer: MEDICARE

## 2025-01-09 LAB
25(OH)D3 SERPL-MCNC: 42 NG/ML (ref 30–100)
ABO GROUP (TYPE) IN BLOOD: NORMAL
ALBUMIN SERPL BCP-MCNC: 2.5 G/DL (ref 3.4–5)
ALP SERPL-CCNC: 55 U/L (ref 33–136)
ALT SERPL W P-5'-P-CCNC: 12 U/L (ref 7–45)
ANION GAP SERPL CALC-SCNC: 14 MMOL/L (ref 10–20)
ANTIBODY SCREEN: NORMAL
AST SERPL W P-5'-P-CCNC: 13 U/L (ref 9–39)
BASOPHILS # BLD AUTO: 0.02 X10*3/UL (ref 0–0.1)
BASOPHILS NFR BLD AUTO: 0.1 %
BILIRUB SERPL-MCNC: 0.3 MG/DL (ref 0–1.2)
BUN SERPL-MCNC: 20 MG/DL (ref 6–23)
CALCIUM SERPL-MCNC: 12.2 MG/DL (ref 8.6–10.6)
CARDIAC TROPONIN I PNL SERPL HS: 15 NG/L (ref 0–34)
CHLORIDE SERPL-SCNC: 99 MMOL/L (ref 98–107)
CO2 SERPL-SCNC: 27 MMOL/L (ref 21–32)
CREAT SERPL-MCNC: 0.92 MG/DL (ref 0.5–1.05)
EGFRCR SERPLBLD CKD-EPI 2021: 60 ML/MIN/1.73M*2
EOSINOPHIL # BLD AUTO: 0 X10*3/UL (ref 0–0.4)
EOSINOPHIL NFR BLD AUTO: 0 %
ERYTHROCYTE [DISTWIDTH] IN BLOOD BY AUTOMATED COUNT: 14.6 % (ref 11.5–14.5)
GLUCOSE BLD MANUAL STRIP-MCNC: 123 MG/DL (ref 74–99)
GLUCOSE SERPL-MCNC: 122 MG/DL (ref 74–99)
HCT VFR BLD AUTO: 22.2 % (ref 36–46)
HGB BLD-MCNC: 9 G/DL (ref 12–16)
HOLD SPECIMEN: NORMAL
IMM GRANULOCYTES # BLD AUTO: 0.26 X10*3/UL (ref 0–0.5)
IMM GRANULOCYTES NFR BLD AUTO: 1.3 % (ref 0–0.9)
LYMPHOCYTES # BLD AUTO: 0.18 X10*3/UL (ref 0.8–3)
LYMPHOCYTES NFR BLD AUTO: 0.9 %
MCH RBC QN AUTO: 39.3 PG (ref 26–34)
MCHC RBC AUTO-ENTMCNC: 40.5 G/DL (ref 32–36)
MCV RBC AUTO: 97 FL (ref 80–100)
MONOCYTES # BLD AUTO: 0.91 X10*3/UL (ref 0.05–0.8)
MONOCYTES NFR BLD AUTO: 4.5 %
NEUTROPHILS # BLD AUTO: 18.87 X10*3/UL (ref 1.6–5.5)
NEUTROPHILS NFR BLD AUTO: 93.2 %
NRBC BLD-RTO: 0 /100 WBCS (ref 0–0)
PLATELET # BLD AUTO: 407 X10*3/UL (ref 150–450)
POTASSIUM SERPL-SCNC: 4.6 MMOL/L (ref 3.5–5.3)
PROT SERPL-MCNC: 4.5 G/DL (ref 6.4–8.2)
PTH-INTACT SERPL-MCNC: 11 PG/ML (ref 18.5–88)
RBC # BLD AUTO: 2.29 X10*6/UL (ref 4–5.2)
RH FACTOR (ANTIGEN D): NORMAL
SODIUM SERPL-SCNC: 135 MMOL/L (ref 136–145)
WBC # BLD AUTO: 20.2 X10*3/UL (ref 4.4–11.3)

## 2025-01-09 PROCEDURE — 72148 MRI LUMBAR SPINE W/O DYE: CPT

## 2025-01-09 PROCEDURE — 84484 ASSAY OF TROPONIN QUANT: CPT

## 2025-01-09 PROCEDURE — 83970 ASSAY OF PARATHORMONE: CPT

## 2025-01-09 PROCEDURE — 2500000004 HC RX 250 GENERAL PHARMACY W/ HCPCS (ALT 636 FOR OP/ED)

## 2025-01-09 PROCEDURE — 84075 ASSAY ALKALINE PHOSPHATASE: CPT

## 2025-01-09 PROCEDURE — 82652 VIT D 1 25-DIHYDROXY: CPT

## 2025-01-09 PROCEDURE — 93010 ELECTROCARDIOGRAM REPORT: CPT | Performed by: INTERNAL MEDICINE

## 2025-01-09 PROCEDURE — 72148 MRI LUMBAR SPINE W/O DYE: CPT | Performed by: RADIOLOGY

## 2025-01-09 PROCEDURE — 93005 ELECTROCARDIOGRAM TRACING: CPT

## 2025-01-09 PROCEDURE — 82397 CHEMILUMINESCENT ASSAY: CPT

## 2025-01-09 PROCEDURE — 99233 SBSQ HOSP IP/OBS HIGH 50: CPT | Performed by: INTERNAL MEDICINE

## 2025-01-09 PROCEDURE — 2500000002 HC RX 250 W HCPCS SELF ADMINISTERED DRUGS (ALT 637 FOR MEDICARE OP, ALT 636 FOR OP/ED)

## 2025-01-09 PROCEDURE — 2500000001 HC RX 250 WO HCPCS SELF ADMINISTERED DRUGS (ALT 637 FOR MEDICARE OP)

## 2025-01-09 PROCEDURE — 85025 COMPLETE CBC W/AUTO DIFF WBC: CPT

## 2025-01-09 PROCEDURE — 2500000005 HC RX 250 GENERAL PHARMACY W/O HCPCS

## 2025-01-09 PROCEDURE — 1170000001 HC PRIVATE ONCOLOGY ROOM DAILY

## 2025-01-09 PROCEDURE — 82947 ASSAY GLUCOSE BLOOD QUANT: CPT

## 2025-01-09 PROCEDURE — 82306 VITAMIN D 25 HYDROXY: CPT

## 2025-01-09 RX ORDER — PROCHLORPERAZINE MALEATE 10 MG
10 TABLET ORAL EVERY 6 HOURS PRN
OUTPATIENT
Start: 2025-01-17

## 2025-01-09 RX ORDER — SODIUM CHLORIDE 9 MG/ML
100 INJECTION, SOLUTION INTRAVENOUS CONTINUOUS
Status: DISCONTINUED | OUTPATIENT
Start: 2025-01-09 | End: 2025-01-10

## 2025-01-09 RX ORDER — DIPHENHYDRAMINE HYDROCHLORIDE 50 MG/ML
50 INJECTION INTRAMUSCULAR; INTRAVENOUS AS NEEDED
OUTPATIENT
Start: 2025-01-24

## 2025-01-09 RX ORDER — DIPHENHYDRAMINE HCL 50 MG
50 CAPSULE ORAL ONCE
OUTPATIENT
Start: 2025-01-24

## 2025-01-09 RX ORDER — PROCHLORPERAZINE MALEATE 10 MG
10 TABLET ORAL EVERY 6 HOURS PRN
OUTPATIENT
Start: 2025-01-24

## 2025-01-09 RX ORDER — FAMOTIDINE 10 MG/ML
20 INJECTION INTRAVENOUS ONCE AS NEEDED
OUTPATIENT
Start: 2025-01-17

## 2025-01-09 RX ORDER — DIPHENHYDRAMINE HCL 50 MG
50 CAPSULE ORAL ONCE
OUTPATIENT
Start: 2025-01-17

## 2025-01-09 RX ORDER — PROCHLORPERAZINE EDISYLATE 5 MG/ML
10 INJECTION INTRAMUSCULAR; INTRAVENOUS EVERY 6 HOURS PRN
OUTPATIENT
Start: 2025-01-31

## 2025-01-09 RX ORDER — ZOLEDRONIC ACID 0.04 MG/ML
4 INJECTION, SOLUTION INTRAVENOUS ONCE
Status: DISCONTINUED | OUTPATIENT
Start: 2025-01-09 | End: 2025-01-09

## 2025-01-09 RX ORDER — ACETAMINOPHEN 325 MG/1
650 TABLET ORAL ONCE
OUTPATIENT
Start: 2025-01-24

## 2025-01-09 RX ORDER — FAMOTIDINE 10 MG/ML
20 INJECTION INTRAVENOUS AS NEEDED
OUTPATIENT
Start: 2025-01-24

## 2025-01-09 RX ORDER — DIPHENHYDRAMINE HYDROCHLORIDE 50 MG/ML
50 INJECTION INTRAMUSCULAR; INTRAVENOUS AS NEEDED
OUTPATIENT
Start: 2025-01-17

## 2025-01-09 RX ORDER — FUROSEMIDE 10 MG/ML
40 INJECTION INTRAMUSCULAR; INTRAVENOUS ONCE
Status: COMPLETED | OUTPATIENT
Start: 2025-01-09 | End: 2025-01-09

## 2025-01-09 RX ORDER — DEXAMETHASONE 4 MG/1
16 TABLET ORAL ONCE
OUTPATIENT
Start: 2025-01-17

## 2025-01-09 RX ORDER — PROCHLORPERAZINE EDISYLATE 5 MG/ML
10 INJECTION INTRAMUSCULAR; INTRAVENOUS EVERY 6 HOURS PRN
OUTPATIENT
Start: 2025-01-17

## 2025-01-09 RX ORDER — DEXAMETHASONE 4 MG/1
16 TABLET ORAL ONCE
OUTPATIENT
Start: 2025-01-31

## 2025-01-09 RX ORDER — ALBUTEROL SULFATE 0.83 MG/ML
3 SOLUTION RESPIRATORY (INHALATION) AS NEEDED
OUTPATIENT
Start: 2025-01-24

## 2025-01-09 RX ORDER — ALBUTEROL SULFATE 0.83 MG/ML
3 SOLUTION RESPIRATORY (INHALATION) AS NEEDED
OUTPATIENT
Start: 2025-01-17

## 2025-01-09 RX ORDER — PROCHLORPERAZINE MALEATE 10 MG
10 TABLET ORAL EVERY 6 HOURS PRN
OUTPATIENT
Start: 2025-01-31

## 2025-01-09 RX ORDER — ALBUTEROL SULFATE 0.83 MG/ML
3 SOLUTION RESPIRATORY (INHALATION) AS NEEDED
OUTPATIENT
Start: 2025-01-31

## 2025-01-09 RX ORDER — DEXAMETHASONE 4 MG/1
16 TABLET ORAL EVERY 24 HOURS
OUTPATIENT
Start: 2025-01-24

## 2025-01-09 RX ORDER — DIPHENHYDRAMINE HCL 50 MG
50 CAPSULE ORAL ONCE
OUTPATIENT
Start: 2025-01-31

## 2025-01-09 RX ORDER — DIPHENHYDRAMINE HYDROCHLORIDE 50 MG/ML
50 INJECTION INTRAMUSCULAR; INTRAVENOUS AS NEEDED
OUTPATIENT
Start: 2025-01-31

## 2025-01-09 RX ORDER — ACETAMINOPHEN 325 MG/1
650 TABLET ORAL ONCE
OUTPATIENT
Start: 2025-01-17

## 2025-01-09 RX ORDER — PROCHLORPERAZINE EDISYLATE 5 MG/ML
10 INJECTION INTRAMUSCULAR; INTRAVENOUS EVERY 6 HOURS PRN
OUTPATIENT
Start: 2025-01-24

## 2025-01-09 RX ORDER — MAGNESIUM SULFATE HEPTAHYDRATE 40 MG/ML
2 INJECTION, SOLUTION INTRAVENOUS ONCE
Status: COMPLETED | OUTPATIENT
Start: 2025-01-09 | End: 2025-01-09

## 2025-01-09 RX ORDER — EPINEPHRINE 1 MG/ML
0.3 INJECTION, SOLUTION, CONCENTRATE INTRAVENOUS EVERY 5 MIN PRN
OUTPATIENT
Start: 2025-01-17

## 2025-01-09 RX ORDER — EPINEPHRINE 1 MG/ML
0.3 INJECTION, SOLUTION, CONCENTRATE INTRAVENOUS EVERY 5 MIN PRN
OUTPATIENT
Start: 2025-01-31

## 2025-01-09 RX ORDER — FAMOTIDINE 10 MG/ML
20 INJECTION INTRAVENOUS ONCE AS NEEDED
OUTPATIENT
Start: 2025-01-31

## 2025-01-09 RX ORDER — EPINEPHRINE 1 MG/ML
0.3 INJECTION, SOLUTION, CONCENTRATE INTRAVENOUS EVERY 5 MIN PRN
OUTPATIENT
Start: 2025-01-24

## 2025-01-09 RX ORDER — ACETAMINOPHEN 325 MG/1
650 TABLET ORAL ONCE
OUTPATIENT
Start: 2025-01-31

## 2025-01-09 RX ADMIN — DEXAMETHASONE 4 MG: 4 TABLET ORAL at 20:14

## 2025-01-09 RX ADMIN — FUROSEMIDE 40 MG: 10 INJECTION, SOLUTION INTRAMUSCULAR; INTRAVENOUS at 13:03

## 2025-01-09 RX ADMIN — METHOCARBAMOL 500 MG: 500 TABLET ORAL at 15:47

## 2025-01-09 RX ADMIN — METOPROLOL SUCCINATE 100 MG: 50 TABLET, EXTENDED RELEASE ORAL at 09:34

## 2025-01-09 RX ADMIN — MAGNESIUM SULFATE IN WATER 2 G: 40 INJECTION, SOLUTION INTRAVENOUS at 02:40

## 2025-01-09 RX ADMIN — DEXAMETHASONE 4 MG: 4 TABLET ORAL at 09:35

## 2025-01-09 RX ADMIN — APIXABAN 5 MG: 5 TABLET, FILM COATED ORAL at 20:13

## 2025-01-09 RX ADMIN — AMIODARONE HYDROCHLORIDE 200 MG: 200 TABLET ORAL at 09:35

## 2025-01-09 RX ADMIN — METHOCARBAMOL 500 MG: 500 TABLET ORAL at 02:07

## 2025-01-09 RX ADMIN — BUSPIRONE HYDROCHLORIDE 7.5 MG: 15 TABLET ORAL at 20:13

## 2025-01-09 RX ADMIN — VANCOMYCIN HYDROCHLORIDE 125 MG: 125 CAPSULE ORAL at 17:40

## 2025-01-09 RX ADMIN — VANCOMYCIN HYDROCHLORIDE 125 MG: 125 CAPSULE ORAL at 13:03

## 2025-01-09 RX ADMIN — ROPINIROLE 0.5 MG: 0.5 TABLET, FILM COATED ORAL at 20:14

## 2025-01-09 RX ADMIN — ZOLEDRONIC ACID 4 MG: 4 INJECTION, SOLUTION, CONCENTRATE INTRAVENOUS at 11:44

## 2025-01-09 RX ADMIN — OXYCODONE 5 MG: 5 TABLET ORAL at 13:02

## 2025-01-09 RX ADMIN — SODIUM CHLORIDE 100 ML/HR: 9 INJECTION, SOLUTION INTRAVENOUS at 09:06

## 2025-01-09 RX ADMIN — VANCOMYCIN HYDROCHLORIDE 125 MG: 125 CAPSULE ORAL at 20:14

## 2025-01-09 RX ADMIN — VANCOMYCIN HYDROCHLORIDE 125 MG: 125 CAPSULE ORAL at 06:04

## 2025-01-09 RX ADMIN — PANTOPRAZOLE SODIUM 40 MG: 40 TABLET, DELAYED RELEASE ORAL at 06:04

## 2025-01-09 RX ADMIN — LIDOCAINE 1 PATCH: 4 PATCH TOPICAL at 20:14

## 2025-01-09 RX ADMIN — BUSPIRONE HYDROCHLORIDE 7.5 MG: 15 TABLET ORAL at 09:34

## 2025-01-09 RX ADMIN — MAGNESIUM OXIDE TAB 400 MG (241.3 MG ELEMENTAL MG) 400 MG: 400 (241.3 MG) TAB at 17:40

## 2025-01-09 RX ADMIN — APIXABAN 5 MG: 5 TABLET, FILM COATED ORAL at 09:35

## 2025-01-09 RX ADMIN — SODIUM CHLORIDE 75 ML/HR: 9 INJECTION, SOLUTION INTRAVENOUS at 00:25

## 2025-01-09 RX ADMIN — BUSPIRONE HYDROCHLORIDE 7.5 MG: 15 TABLET ORAL at 15:06

## 2025-01-09 RX ADMIN — METHIMAZOLE 2.5 MG: 5 TABLET ORAL at 09:35

## 2025-01-09 RX ADMIN — ALTEPLASE 2 MG: 2.2 INJECTION, POWDER, LYOPHILIZED, FOR SOLUTION INTRAVENOUS at 02:22

## 2025-01-09 RX ADMIN — AMLODIPINE BESYLATE 5 MG: 5 TABLET ORAL at 09:35

## 2025-01-09 RX ADMIN — OXYCODONE 5 MG: 5 TABLET ORAL at 06:07

## 2025-01-09 RX ADMIN — DULOXETINE HYDROCHLORIDE 20 MG: 20 CAPSULE, DELAYED RELEASE ORAL at 09:35

## 2025-01-09 ASSESSMENT — COGNITIVE AND FUNCTIONAL STATUS - GENERAL
DAILY ACTIVITIY SCORE: 16
WALKING IN HOSPITAL ROOM: A LITTLE
MOBILITY SCORE: 17
MOVING TO AND FROM BED TO CHAIR: A LITTLE
EATING MEALS: A LITTLE
STANDING UP FROM CHAIR USING ARMS: A LOT
DRESSING REGULAR LOWER BODY CLOTHING: A LOT
MOVING FROM LYING ON BACK TO SITTING ON SIDE OF FLAT BED WITH BEDRAILS: A LITTLE
HELP NEEDED FOR BATHING: A LOT
DRESSING REGULAR UPPER BODY CLOTHING: A LITTLE
CLIMB 3 TO 5 STEPS WITH RAILING: A LITTLE
TURNING FROM BACK TO SIDE WHILE IN FLAT BAD: A LITTLE
TOILETING: A LITTLE
PERSONAL GROOMING: A LITTLE

## 2025-01-09 ASSESSMENT — ENCOUNTER SYMPTOMS
AGITATION: 0
DIZZINESS: 0
CONFUSION: 0
BLOOD IN STOOL: 0
TREMORS: 0
FREQUENCY: 0
VOMITING: 0
FACIAL ASYMMETRY: 0
ABDOMINAL PAIN: 0
WEAKNESS: 1
ADENOPATHY: 0
COUGH: 0
FEVER: 0
CHEST TIGHTNESS: 0
APPETITE CHANGE: 1
MYALGIAS: 1
CONSTIPATION: 0
UNEXPECTED WEIGHT CHANGE: 0
EYES NEGATIVE: 1
ABDOMINAL DISTENTION: 0
NUMBNESS: 1
WHEEZING: 0
DIARRHEA: 0
SHORTNESS OF BREATH: 1
BRUISES/BLEEDS EASILY: 0
DIFFICULTY URINATING: 0
HEADACHES: 0
HEMATURIA: 0
FATIGUE: 0
ACTIVITY CHANGE: 0
RHINORRHEA: 0
NAUSEA: 0
SORE THROAT: 0

## 2025-01-09 ASSESSMENT — PAIN - FUNCTIONAL ASSESSMENT
PAIN_FUNCTIONAL_ASSESSMENT: 0-10

## 2025-01-09 ASSESSMENT — PAIN SCALES - GENERAL
PAINLEVEL_OUTOF10: 6
PAINLEVEL_OUTOF10: 8
PAINLEVEL_OUTOF10: 9
PAINLEVEL_OUTOF10: 2
PAINLEVEL_OUTOF10: 2

## 2025-01-09 ASSESSMENT — PAIN SCALES - WONG BAKER: WONGBAKER_NUMERICALRESPONSE: HURTS WHOLE LOT

## 2025-01-09 ASSESSMENT — PAIN DESCRIPTION - LOCATION: LOCATION: GENERALIZED

## 2025-01-09 ASSESSMENT — ACTIVITIES OF DAILY LIVING (ADL): LACK_OF_TRANSPORTATION: NO

## 2025-01-09 NOTE — CARE PLAN
Problem: Respiratory  Goal: Clear secretions with interventions this shift  Outcome: Progressing  Goal: Minimize anxiety/maximize coping throughout shift  Outcome: Progressing  Goal: Minimal/no exertional discomfort or dyspnea this shift  Outcome: Progressing  Goal: No signs of respiratory distress (eg. Use of accessory muscles. Peds grunting)  Outcome: Progressing  Goal: Patent airway maintained this shift  Outcome: Progressing  Goal: Tolerate mechanical ventilation evidenced by VS/agitation level this shift  Outcome: Progressing  Goal: Tolerate pulmonary toileting this shift  Outcome: Progressing  Goal: Verbalize decreased shortness of breath this shift  Outcome: Progressing  Goal: Wean oxygen to maintain O2 saturation per order/standard this shift  Outcome: Progressing  Goal: Increase self care and/or family involvement in next 24 hours  Outcome: Progressing     Problem: Fall/Injury  Goal: Not fall by end of shift  Outcome: Progressing  Goal: Be free from injury by end of the shift  Outcome: Progressing  Goal: Verbalize understanding of personal risk factors for fall in the hospital  Outcome: Progressing  Goal: Verbalize understanding of risk factor reduction measures to prevent injury from fall in the home  Outcome: Progressing  Goal: Use assistive devices by end of the shift  Outcome: Progressing  Goal: Pace activities to prevent fatigue by end of the shift  Outcome: Progressing     Problem: Infection related to problem list condition  Goal: Infection will resolve through treatment  Outcome: Progressing     Problem: Skin  Goal: Decreased wound size/increased tissue granulation at next dressing change  Outcome: Progressing  Flowsheets (Taken 1/9/2025 1237)  Decreased wound size/increased tissue granulation at next dressing change:   Promote sleep for wound healing   Protective dressings over bony prominences  Goal: Participates in plan/prevention/treatment measures  Outcome: Progressing  Flowsheets (Taken  1/9/2025 1237)  Participates in plan/prevention/treatment measures:   Discuss with provider PT/OT consult   Elevate heels   Increase activity/out of bed for meals  Goal: Prevent/manage excess moisture  Outcome: Progressing  Flowsheets (Taken 1/9/2025 1237)  Prevent/manage excess moisture:   Cleanse incontinence/protect with barrier cream   Moisturize dry skin   Follow provider orders for dressing changes   Monitor for/manage infection if present  Goal: Prevent/minimize sheer/friction injuries  Outcome: Progressing  Flowsheets (Taken 1/9/2025 1237)  Prevent/minimize sheer/friction injuries:   Complete micro-shifts as needed if patient unable. Adjust patient position to relieve pressure points, not a full turn   HOB 30 degrees or less   Increase activity/out of bed for meals   Turn/reposition every 2 hours/use positioning/transfer devices   Use pull sheet  Goal: Promote/optimize nutrition  Outcome: Progressing  Flowsheets (Taken 1/9/2025 1237)  Promote/optimize nutrition:   Assist with feeding   Discuss with provider if NPO > 2 days   Offer water/supplements/favorite foods   Consume > 50% meals/supplements   Monitor/record intake including meals  Goal: Promote skin healing  Outcome: Progressing  Flowsheets (Taken 1/9/2025 1237)  Promote skin healing:   Assess skin/pad under line(s)/device(s)   Ensure correct size (line/device) and apply per  instructions   Protective dressings over bony prominences   Rotate device position/do not position patient on device   Turn/reposition every 2 hours/use positioning/transfer devices   The patient's goals for the shift include      The clinical goals for the shift include Patient will be hemodynamically stable.

## 2025-01-09 NOTE — PROGRESS NOTES
Simi Sandoval is a 89 y.o. female on day 1 of admission presenting with DLBCL (diffuse large B cell lymphoma).    Subjective   Afebrile, VSS. Pt pleasant during visit, son at bedside. Discussed possible BiTE therapy. Discussed other healthcare issues with son and progress (PNA, Cdiff). Pt complained of pain in left calf, will get ultrasound to rule out DVT. Pt endorses improvement in her diarrhea.        Objective     Physical Exam  Constitutional:       Appearance: She is ill-appearing.   HENT:      Head: Normocephalic.      Nose: Nose normal.      Mouth/Throat:      Mouth: Mucous membranes are dry.      Pharynx: No oropharyngeal exudate or posterior oropharyngeal erythema.   Eyes:      General: No scleral icterus.     Extraocular Movements: Extraocular movements intact.      Pupils: Pupils are equal, round, and reactive to light.   Cardiovascular:      Rate and Rhythm: Normal rate and regular rhythm.      Pulses: Normal pulses.      Heart sounds: Normal heart sounds. No murmur heard.     No friction rub. No gallop.   Pulmonary:      Effort: Pulmonary effort is normal.      Breath sounds: Decreased air movement present.   Abdominal:      General: Bowel sounds are increased. There is no distension.      Palpations: Abdomen is soft.      Tenderness: There is no abdominal tenderness.   Musculoskeletal:         General: Tenderness (LLE) present.      Cervical back: Normal range of motion and neck supple.      Left lower leg: Edema present.   Skin:     General: Skin is warm and dry.   Neurological:      General: No focal deficit present.      Mental Status: She is alert and oriented to person, place, and time. Mental status is at baseline.   Psychiatric:         Mood and Affect: Mood normal.         Behavior: Behavior normal.         Thought Content: Thought content normal.         Judgment: Judgment normal.         Last Recorded Vitals  Blood pressure 137/78, pulse 78, temperature 36.7 °C (98.1 °F), resp. rate  "18, height 1.626 m (5' 4\"), weight 64 kg (141 lb), SpO2 96%.  Intake/Output last 3 Shifts:  I/O last 3 completed shifts:  In: 445 (7 mL/kg) [I.V.:445 (7 mL/kg)]  Out: 150 (2.3 mL/kg) [Urine:150 (0.1 mL/kg/hr)]  Weight: 64 kg     Relevant Results                 Assessment & Plan  DLBCL (diffuse large B cell lymphoma)    Simi Sandoval is an 89-year-old F with PMH DLBCL of left breast, A-fib (on Eliquis), pulmonary hypertension, Hysterectomy, Thyroid disease,  CHF, NSTEMI, s/p uretral stent (12/13) who comes in for evaluation of left flank pain, left  sided abdominal pain  and diarrhea at OSH on 12/27. Colonoscopy completed 12/30 and was found to have Cdiff colitis  with pancolitis. Pt was started on Vanco for Pancolitis and was treated with zosyn for aspiration pneumonia seen on CT A/P S/P colonoscopy. CT imaging found multiple pulmonary nodules bilaterally, largest dependently on the left measuring 2.6 cm. Biopsy of left lung nodule done 12/11/2024 showed diffuse aggressive large B-cell lymphoma, recurrent by history. Heme/Onc saw the pt at OSH and recommended to transfer to [facility] for further work up, evaluation and treatment of disease recurrence.     ONC   #Diffuse large B-cell lymphoma   - Last treated with Tafasitamab 5/16/2023   - New lung nodules favoring recurrence of lymphoma back in December.    - last chemo in 2023   - Pathology from lung nodule biopsy 12/11/2024 confirmed disease recurrence .  - CT Flank 12/27/2024 shows disease progression in pulmonary nodules and new cortical lytic lesions in L4-5.   - S/p Dexamethasone 4 mg BID at OSH by Heme/Onc and ordered MRI per Oncology at OSH on admission to rule out cord compression.  - MRI 1/9: negative for cord compression, soft tissue masses involving the paraspinal musculature, left greater than right with involvement of the left lateral epidural space at the L5 level resulting in moderate spinal canal stenosis  - Plan for possible BiTE therapy with " Teclistamab     HEME  #Leukocytosis  -WBC 25.4 on admit  -most likely secondary to colitis  #Anemia  -Hgb 10 on admit  -Transfuse if Hgb<7  -Monitor CBC daily     PULM  #Acute hypoxic Resp failure    #Aspiration Pneumonia  - Patient likely with aspiration pneumonia status post colonoscopy on 12/30/2024.  She was supine laying on her left side which is where the infiltrate was shown on chest x-ray.  Concern for aspiration during procedure  - S/p 7 day course of Zosyn at OSH  - CXR on admission shows with mild-to-moderate degree of pulmonary interstitial edema. Small left  and right pleural effusion  - 40mg IV lasix (1/9) for pulm edema  - S/p bipap for acute hypoxic respiratory failure at OSH  - S/p intubation in 11/2024 for pneumonia and CHF exacerbation  - Doesn't wear O2 at baseline, wean as tolerated     FEN/GI  Admit wt: 64 kg  F: NS@100ml/hr  E: PRN  N: Low path  #GURINDER (resolved)   - sCr 1.01 on admit  - Monitor CMP daily  # C. diff colitis with pan colitis  (Resolving)  - CT A/P shows mild pancolitis on distal rectosigmoid and pseudomembranes colitis on Colonoscopy (12/30)   - S/p PO Vanco at OSH and continue po vancomycin to 125mg qid on admit with last doses 1/13/25.   # Hypercalcemia  - sCa 12.6 on admit  - Increase mIVF to 100ml/hr  - Zometa 4mg x1  #Hyponatremia  - On IVF NS on admit  - Monitor daily     URO  # Left hydronephrosis, Urinary Retention s/p Left ureteral stent placed on 12/13/24   - Seen by Urology at OSH  - S/p maki removed on 1/5   - Voiding fine on admit     CARDS   - LVEF 57%. Vegetation noted on prior echo.  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 She denies any increase or change in her neuropathy symptoms. Per Neurology at OSH  - Continue  supportive measures    - No further neurological testing needed at this point      MSK   # Lumbar Spinal stenosis    - Started on Oxy 5 mg PRN and cont with Robaxin 500 mg TID PRN     PSYCH   # Anxiety   - C/w Buspar 7.5 mg TID and Cymbalta     ENDO   # Hyperthyroidism   - C/w methimazole      Dispo  - Full code  - R Med port  - Primary Oncologist Dr Mac    Patient seen, examined, and discussed with Dr. Stella Mac.         Delmer Hendrickson, APRN-CNP

## 2025-01-09 NOTE — CARE PLAN
Problem: Respiratory  Goal: Minimize anxiety/maximize coping throughout shift  1/8/2025 2243 by Palak Tran RN  Outcome: Progressing  1/8/2025 2243 by Palak Tran RN  Outcome: Progressing  1/8/2025 2243 by Palak Tran RN  Outcome: Progressing  1/8/2025 2135 by Palak Tran RN  Outcome: Progressing     Problem: Respiratory  Goal: Minimal/no exertional discomfort or dyspnea this shift  1/8/2025 2243 by Palak Tran, RN  Outcome: Progressing  1/8/2025 2243 by Palak Tran RN  Outcome: Progressing  1/8/2025 2243 by Palak Tran RN  Outcome: Progressing  1/8/2025 2135 by Palak Tran RN  Outcome: Progressing     Problem: Respiratory  Goal: No signs of respiratory distress (eg. Use of accessory muscles. Peds grunting)  1/8/2025 2243 by Palak Tran RN  Outcome: Progressing  1/8/2025 2243 by Palak Tran RN  Outcome: Progressing  1/8/2025 2243 by Palak Tran RN  Outcome: Progressing  1/8/2025 2135 by Palak Tran RN  Outcome: Progressing     Problem: Respiratory  Goal: Patent airway maintained this shift  1/8/2025 2243 by Palak Tran RN  Outcome: Progressing  1/8/2025 2243 by Palak Tran RN  Outcome: Progressing  1/8/2025 2243 by Palak Tran RN  Outcome: Progressing  1/8/2025 2135 by Palak Tran RN  Outcome: Progressing     Problem: Respiratory  Goal: Tolerate mechanical ventilation evidenced by VS/agitation level this shift  1/8/2025 2243 by Palak Tran RN  Outcome: Progressing  1/8/2025 2243 by Palak Tran RN  Outcome: Progressing  1/8/2025 2243 by Palak Tran RN  Outcome: Progressing  1/8/2025 2135 by Palak Tran RN  Outcome: Progressing     Problem: Respiratory  Goal: Tolerate pulmonary toileting this shift  1/8/2025 2243 by Palak Tran RN  Outcome: Progressing  1/8/2025 2243 by Palak Tran RN  Outcome: Progressing  1/8/2025 2243 by Palak Tran RN  Outcome: Progressing  1/8/2025 2135 by Palak Tran RN  Outcome: Progressing     Problem: Respiratory  Goal: Wean oxygen to maintain  O2 saturation per order/standard this shift  1/8/2025 2243 by Palak Tran RN  Outcome: Progressing  1/8/2025 2243 by Palak Tran RN  Outcome: Progressing  1/8/2025 2243 by Palak Tran RN  Outcome: Progressing  1/8/2025 2135 by Palak Tran RN  Outcome: Progressing     Problem: Respiratory  Goal: Verbalize decreased shortness of breath this shift  1/8/2025 2243 by Palak Tran RN  Outcome: Progressing  1/8/2025 2243 by Palak Tran RN  Outcome: Progressing  1/8/2025 2243 by Palak Tran RN  Outcome: Progressing  1/8/2025 2135 by Palak Tran RN  Outcome: Progressing     Problem: Respiratory  Goal: Increase self care and/or family involvement in next 24 hours  1/8/2025 2243 by Palak Tran RN  Outcome: Progressing  1/8/2025 2243 by Palak Tran, RN  Outcome: Progressing  1/8/2025 2243 by Palak Tran RN  Outcome: Progressing  1/8/2025 2135 by Palak Tran RN  Outcome: Progressing     Problem: Fall/Injury  Goal: Not fall by end of shift  1/8/2025 2243 by Palak Tran RN  Outcome: Progressing  1/8/2025 2243 by Palak Tran RN  Outcome: Progressing  1/8/2025 2243 by Palak Tran RN  Outcome: Progressing  Goal: Be free from injury by end of the shift  1/8/2025 2243 by Palak Tran, RN  Outcome: Progressing  1/8/2025 2243 by Palak Tran RN  Outcome: Progressing  1/8/2025 2243 by Palak Tran RN  Outcome: Progressing  Goal: Verbalize understanding of personal risk factors for fall in the hospital  1/8/2025 2243 by Palak Tran RN  Outcome: Progressing  1/8/2025 2243 by Palak Tran RN  Outcome: Progressing  1/8/2025 2243 by Palak Tran RN  Outcome: Progressing  Goal: Verbalize understanding of risk factor reduction measures to prevent injury from fall in the home  1/8/2025 2243 by Palak Tran, RN  Outcome: Progressing  1/8/2025 2243 by Davae Marc, RN  Outcome: Progressing  1/8/2025 2243 by Palak Tran RN  Outcome: Progressing  Goal: Use assistive devices by end of the shift  1/8/2025 2243 by  Palak Tran, RN  Outcome: Progressing  1/8/2025 2243 by Palak Tran RN  Outcome: Progressing  1/8/2025 2243 by Palak Tran RN  Outcome: Progressing  Goal: Pace activities to prevent fatigue by end of the shift  1/8/2025 2243 by Palak Tran, RN  Outcome: Progressing  1/8/2025 2243 by Palak Tran, RN  Outcome: Progressing  1/8/2025 2243 by Palak Tran RN  Outcome: Progressing     Problem: Infection related to problem list condition  Goal: Infection will resolve through treatment  1/8/2025 2243 by Palak Tran, RN  Outcome: Progressing  1/8/2025 2243 by Palak Tran RN  Outcome: Progressing  1/8/2025 2243 by Palak Tran RN  Outcome: Progressing     Problem: Skin  Goal: Decreased wound size/increased tissue granulation at next dressing change  1/8/2025 2243 by Palak Tran, RN  Outcome: Progressing  1/8/2025 2243 by Palak Tran RN  Outcome: Progressing  1/8/2025 2243 by Palak Tran RN  Outcome: Progressing  Goal: Participates in plan/prevention/treatment measures  1/8/2025 2243 by Palak Tran RN  Outcome: Progressing  1/8/2025 2243 by Palak Tran RN  Outcome: Progressing  1/8/2025 2243 by Palak Tran RN  Outcome: Progressing  Goal: Prevent/manage excess moisture  1/8/2025 2243 by Palak Tran RN  Outcome: Progressing  1/8/2025 2243 by Palak Tran RN  Outcome: Progressing  1/8/2025 2243 by Palak Tran RN  Outcome: Progressing  Goal: Prevent/minimize sheer/friction injuries  1/8/2025 2243 by Palak Tran, RN  Outcome: Progressing  1/8/2025 2243 by Palak Tran RN  Outcome: Progressing  1/8/2025 2243 by Palak Tran RN  Outcome: Progressing  Goal: Promote/optimize nutrition  1/8/2025 2243 by Palak Tran, RN  Outcome: Progressing  1/8/2025 2243 by Palak Tran RN  Outcome: Progressing  1/8/2025 2243 by Palak Tran RN  Outcome: Progressing  Goal: Promote skin healing  1/8/2025 2243 by Palak Tran RN  Outcome: Progressing  1/8/2025 2243 by Palak Tran RN  Outcome: Progressing  1/8/2025  2243 by Palak Tran RN  Outcome: Progressing     Problem: Respiratory  Goal: Clear secretions with interventions this shift  1/8/2025 2243 by Palak Tran RN  Outcome: Progressing  1/8/2025 2243 by Palak Tran RN  Outcome: Progressing  1/8/2025 2243 by Palak Tran RN  Outcome: Progressing  1/8/2025 2135 by Palak Tran RN  Outcome: Progressing

## 2025-01-09 NOTE — PROGRESS NOTES
01/09/25 1417   Discharge Planning   Living Arrangements Children   Support Systems Children   Assistance Needed none   Type of Residence Private residence   Home or Post Acute Services None   Expected Discharge Disposition Home   Does the patient need discharge transport arranged? No   Financial Resource Strain   How hard is it for you to pay for the very basics like food, housing, medical care, and heating? Not very   Housing Stability   In the last 12 months, was there a time when you were not able to pay the mortgage or rent on time? N   In the past 12 months, how many times have you moved where you were living? 0   At any time in the past 12 months, were you homeless or living in a shelter (including now)? N   Transportation Needs   In the past 12 months, has lack of transportation kept you from medical appointments or from getting medications? no   In the past 12 months, has lack of transportation kept you from meetings, work, or from getting things needed for daily living? No     Pt with DLBCL was admitted with pain due to disease progression.  Recent C.diff and aspiration PNA.  Met with the pt and her son to discuss her home going plan/verify demographics.  The pt lives with her son and states she has still been independent at home and uses a cane when out of the house.  She was still driving until recently.  She has used [facility] in the past but is not active with home care currently.  PT eval pending.  Anticipate that the pt may need HC for PT after discharge.   She has a mediport.  MD is Dr. Stella Mac.  Will continue to follow to assist with the home going plan.  Carissa Roberts RN, [facility]

## 2025-01-09 NOTE — H&P
History Of Present Illness  Simi Sandoval is a 89 y.o. female presenting with PMH DLBCL of left breast, A-fib (on Eliquis), pulmonary hypertension, Hysterectomy, Thyroid disease,  CHF, NSTEMI, s/p uretral stent (12/13) who comes in for evaluation of left flank pain, left  sided abdominal pain  and diarrhea at OSH ED on 12/27. Colonoscopy completed 12/30 and was found to have Cdiff colitis  with pancolitis. Pt was started on Vanco for Pancolitis and was treated with antibiotics for aspiration pneumonia seen on CT A/P S/P colonoscopy. CT imaging found multiple pulmonary nodules bilaterally, largest dependently on the left measuring 2.6 cm. Biopsy of left lung nodule done 12/11/2024 showed diffuse aggressive large B-cell lymphoma, recurrent by history. Heme/Onc saw the pt at OSH and recommended to transfer to [facility] for further work up, evaluation and treatment of disease recurrence.  On admission pt complains of left leg pain radiating downwards on ambulation and right groin and thigh pain on ambulation. Does also admit that her stools her sometimes formed or sometimes like pudding consistency but denies any associated nausea, vomiting, fevers,hematuria, dysuria or chills.     OSH Course  Temp 98.2 /58   Pulse 77   Resp 17  SpO2 100% on 2L   Labs WBC 18.70  Hgb  9.7  Na 133  CA 12.4  Mg 1.6 Colonoscopy 12/30 pseudomembranous colitis  CT and CXR shows aspiration pneumonia  S/P Po vanco, completed 7 days of Zosyn, Dex BID, Magnesium IV  Past Medical History  She has no past medical history on file.    Surgical History  She has no past surgical history on file.    Oncology History Overview Note   1. 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 CT imaging was negative for cycle 2 of chemotherapy. The patient subsequently received treatment with 30 Gy radiation to the left breast  completing August 2008.    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  Bulky recurrence documented 8/30/19 after presentation with hyponatremia. Bronchoscopy at [facility] on 8/30/19  showed diffuse large B cell lymphoma non germinal center cell with double expression of bcl2 and cmyc, but not double hit.   PET scan from [employer] system done 9/9/19 ordered by Dr. Ulices Leon with findings: lobulated mass in RUL 3.6 x 2.4 cm with SUV 36.8, hilar lymphadenopathy on right  with SUV 32.6, also uptake pleura and intercostal musculature. Abdominal findings include  several abnormal foci in liver , portacaval area with SUV of 4.5, uptake in T1 suspicious.   Initiation of R-gem cis 9/17/19, Completed cycle 5 of R-gem cis on 12/26/2019 with negative PET imaging on 1/30/2020.  Initiation of revlimid maintenance February 2020. discontinued due to poor tolerance  10/2020 Dx with recurrent lyphoma left chest wall  12/1/2020 Started Tafasetinib (Monjuvi) and Revlimid salvage therapy for DLBCL relapse acheived a complete clinical remission.    She completed treatment with Revlimid 5 mg days 1-21/28 days on 11/4/21.  Remains on Tafasitamab (Monjuvi) on days 1 Last dose of Monjuvi after PET imaging 5/8/23 was clear except for questionable  area right cervical level IVB lymph node with SUV of 5.1 due to patient's preference     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)   10/30/2023 Initial Diagnosis    Diffuse large B-cell lymphoma of lymph nodes of multiple regions (CMS/HCC)          Social History  She reports that she has never smoked. She has never been exposed to tobacco smoke. She has never used smokeless tobacco. No history on file for alcohol use and drug use.     Allergies  Ace inhibitors, Ciprofloxacin, Cortisone, Diltiazem hcl, Iodinated contrast media, Metronidazole, Nizatidine,  Penicillins, Sertraline, Sulfamethoxazole, and Venlafaxine    Review of Systems   Constitutional:  Positive for appetite change. Negative for activity change, fatigue, fever and unexpected weight change.   HENT:  Negative for congestion, mouth sores, rhinorrhea and sore throat.    Eyes: Negative.    Respiratory:  Positive for shortness of breath. Negative for cough, chest tightness and wheezing.    Cardiovascular:  Positive for leg swelling (b/l legs 1+).   Gastrointestinal:  Negative for abdominal distention, abdominal pain, blood in stool, constipation, diarrhea, nausea and vomiting.   Genitourinary:  Negative for difficulty urinating, frequency, hematuria, pelvic pain and urgency.   Musculoskeletal:  Positive for myalgias.   Skin:  Negative for pallor and rash.   Neurological:  Positive for weakness and numbness. Negative for dizziness, tremors, facial asymmetry and headaches.   Hematological:  Negative for adenopathy. Does not bruise/bleed easily.   Psychiatric/Behavioral:  Negative for agitation, behavioral problems and confusion.         Physical Exam     Last Recorded Vitals  Blood pressure 150/70, pulse 83, temperature 36.9 °C (98.4 °F), temperature source Temporal, resp. rate 16, SpO2 92%.    Relevant Results  Scheduled medications  [START ON 1/9/2025] amiodarone, 200 mg, oral, Daily  [START ON 1/9/2025] amLODIPine, 5 mg, oral, Daily  apixaban, 5 mg, oral, BID  dexAMETHasone, 4 mg, oral, q12h VERONICA  [START ON 1/9/2025] DULoxetine, 20 mg, oral, Daily  lidocaine, 1 patch, transdermal, Daily  magnesium oxide, 400 mg, oral, q12h VERONICA  [START ON 1/9/2025] methIMAzole, 2.5 mg, oral, Daily  [START ON 1/9/2025] metoprolol succinate XL, 100 mg, oral, Daily  [START ON 1/9/2025] pantoprazole, 40 mg, oral, Daily before breakfast  rOPINIRole, 0.5 mg, oral, Nightly  vancomycin, 125 mg, oral, 4x daily      Continuous medications     PRN medications  PRN medications: alteplase, ipratropium-albuteroL, melatonin, moisturizing  mouth  XR chest 1 view    Result Date: 1/2/2025  * * *Final Report* * * DATE OF EXAM: Jan 1 2025  6:29PM   SPX   [id number]  -  XR CHEST 1V FRONTAL PORT  / ACCESSION #  [id number] PROCEDURE REASON: Shortness of breath      * * * * Physician Interpretation * * * * RESULT: EXAMINATION:  CHEST RADIOGRAPH (PORTABLE SINGLE VIEW AP) Exam Date/Time:  1/1/2025 6:29 PM CLINICAL HISTORY: Shortness of breath MQ:  XCPR_5 Comparison:  12/31/2024, 12/30/2024, 12/11/2024 RESULT: Lines, tubes, and devices:  Right medication port catheter, unchanged. Lungs and pleura:  Multifocal patchy airspace opacities in the left lung, slightly decreased from most recent study.  Trace bilateral pleural effusions.  No pneumothorax. Cardiomediastinal silhouette:  Stable cardiomediastinal silhouette. Other:  Degenerative changes in the thoracic spine.    IMPRESSION: Multifocal patchy airspace opacities in the left lung, slightly decreased from most recent study. Trace bilateral pleural effusions. Additional findings as detailed in the report. Transcribed Using Voice Recognition Transcribe Date/Time: Jan 2 2025  9:06A Dictated by: ANIKET ALEJO MD This examination was interpreted and the report reviewed and electronically signed by: ANIKET ALEJO MD on Jan 2 2025  9:07AM  EST    XR chest 1 view    Result Date: 12/31/2024  * * *Final Report* * * DATE OF EXAM: Dec 31 2024  7:39AM   SPX   [id number]  -  XR CHEST 1V FRONTAL   / ACCESSION #  [id number] PROCEDURE REASON: Hypoxemia      * * * * Physician Interpretation * * * * RESULT: EXAMINATION:  CHEST RADIOGRAPH (SINGLE VIEW AP OR PA) CLINICAL HISTORY: Hypoxemia MQ:  XC1_5 Comparison:  12/30/2024 RESULT: Lines, tubes, and devices:  Stable right port. Lungs and pleura:  Persistent increased bilateral interstitial and patchy airspace opacities, greater involvement in the left lung, slightly improved.  Persistent small left pleural effusion and trace right pleural effusions.  No pneumothorax. Cardiomediastinal  silhouette:  Normal cardiomediastinal silhouette. Other:  No acute bony abnormality.    IMPRESSION: Slightly improved airspace opacities, worst in the left lung.  Persistent small left infiltrates right pleural effusions. Transcribed Using Voice Recognition Transcribe Date/Time: Dec 31 2024  7:57A Dictated by: BELKYS FONG DO This examination was interpreted and the report reviewed and electronically signed by: BELKYS FONG DO on Dec 31 2024  7:59AM  EST    XR chest 1 view    Result Date: 12/30/2024  * * *Final Report* * * DATE OF EXAM: Dec 30 2024  4:52PM   SPX   [id number]  -  XR CHEST 1V FRONTAL PORT  / ACCESSION #  [id number] PROCEDURE REASON: Shortness of breath      * * * * Physician Interpretation * * * * RESULT: EXAMINATION:  CHEST RADIOGRAPH (SINGLE VIEW AP OR PA) Clinical History:  Shortness of breath M:  XC1_4 Comparison:  12/12/2024 RESULT: Lines, tubes, and devices:  Stable right port. Lungs and pleura:  Bilateral increased interstitial and patchy airspace opacities, worst in the left lung with airspace opacities spanning the upper mid and lower lungs.  No pneumothorax.  Mild blunting of the left costophrenic angle and trace blunting of the right costophrenic angle. Cardiomediastinal silhouette:  Normal cardiomediastinal silhouette. Other:  No acute bony abnormality.    IMPRESSION: Worsening pulmonary edema with possible superimposed infectious process in the left lung.  Small left and trace right pleural effusions. Transcribed Using Voice Recognition Transcribe Date/Time: Dec 30 2024  4:51P Dictated by: BELKYS OFNG DO This examination was interpreted and the report reviewed and electronically signed by: BELKYS FONG DO on Dec 30 2024  4:54PM  EST    Flexible Sigmoidoscopy    Result Date: 12/30/2024  [facility] Gastrointestinal Endoscopy Patient Name: Simi Sandoval Procedure Date: 12/30/2024 1:24 PM MRN: [id number] Account Number: [id number] YOB: 1935 Admit  Type: Inpatient Age: 89 Room: [place] [address] Gender: Female Note Status: Finalized Attending MD: Heather Carroll MD, [id number] Procedure:             Colonoscopy Indications:           Generalized abdominal pain, Diarrhea (secondary to                        noninfectious colitis), Hematochezia, Abnormal CT                        of the GI tract colitis seen on CT, suspect                        pseudomembraneous colitis given IV abx hx in past                        and c diff (+) Comorbidities      INPATIENT, COLITIS ON CT, suspect c diff colitis  Providers:             Heather Carroll MD Patient Profile:       This is an 89 year old female. Refer to note in                        patient chart for documentation of history and                        physical. Last Colonoscopy: date unknown. Referring Physician:   Heather Carroll MD (Referring MD) Medicines:             Monitored Anesthesia Care Complications:         No immediate complications. Requesting Provider:   Procedure:             Pre-Anesthesia Assessment:                        - Prior to the procedure, a History and Physical                        was performed, and patient medications and                        allergies were reviewed. The patient is competent.                        The risks and benefits of the procedure and the                        sedation options and risks were discussed with the                        patient. All questions were answered and informed                        consent was obtained. Patient identification and                        proposed procedure were verified by the physician                        and the nurse in the pre-procedure area in the                        procedure room. Mental Status Examination: alert                        and oriented. Airway Examination: normal                        oropharyngeal airway and neck mobility. Respiratory                        Examination: clear to  auscultation. CV Examination:                        normal. Prophylactic Antibiotics: The patient does                        not require prophylactic antibiotics. Prior                        Anticoagulants: The patient has taken no                        anticoagulant or antiplatelet agents. ASA Grade                        Assessment: IV - A patient with severe systemic                        disease that is a constant threat to life. After                        reviewing the risks and benefits, the patient was                        deemed in satisfactory condition to undergo the                        procedure. The anesthesia plan was to use monitored                        anesthesia care (MAC). Immediately prior to                        administration of medications, the patient was                        re-assessed for adequacy to receive sedatives. The                        heart rate, respiratory rate, oxygen saturations,                        blood pressure, adequacy of pulmonary ventilation,                        and response to care were monitored throughout the                        procedure. The physical status of the patient was                        re-assessed after the procedure.                        After I obtained informed consent, the scope was                        passed under direct vision. Throughout the                        procedure, the patient's blood pressure, pulse, and                        oxygen saturations were monitored continuously. The                        Colonoscope was introduced through the anus and                        advanced to the cecum, identified by appendiceal                        orifice and ileocecal valve. The colonoscopy was                        performed with difficulty due to dolichocolon,                        multiple diverticula in the colon, a redundant                        colon, significant looping and a tortuous colon.                         The patient tolerated the procedure well. The                        quality of the bowel preparation was fair. The                        ileocecal valve, appendiceal orifice, and rectum                        were photographed. Scope Withdrawal Time: 0 hours 8 minutes 14 seconds Moderate Sedation:      MAC anesthesia was administered by the anesthesia team. Total Procedure Duration: 0 hours 18 minutes 30 seconds Findings:      The perianal exam findings include internal hemorrhoids that prolapse      with straining, but require manual replacement into the anal canal      (Grade III) and a skin tag.      The digital rectal exam findings include decreased sphincter tone.      Multiple large-mouthed, medium-mouthed and small-mouthed diverticula      were found in the sigmoid colon.      Multiple large-mouthed, medium-mouthed and small-mouthed diverticula      were found in the entire colon.      The sigmoid colon was significantly tortuous.      The sigmoid colon, descending colon and ascending colon revealed      significantly excessive looping.      A diffuse pseudomembrane was found in the entire colon. Biopsies were      taken with a cold forceps for histology.      A 3 mm polyp was found in the cecum. The polyp was sessile. The polyp      was removed with a cold biopsy forceps. Resection and retrieval were      complete.       Non-bleeding hemorrhoids were found during retroflexion. The      hemorrhoids were Grade III (internal hemorrhoids that prolapse but      require manual reduction).      No additional abnormalities were found on retroflexion. Impression:            - Preparation of the colon was fair. not good                        enough for <10mm polyps                        - Internal hemorrhoids that prolapse with                        straining, but require manual replacement into the                        anal canal (Grade III) and perianal skin tag found                         on perianal exam.                        - Decreased sphincter tone found on digital rectal                        exam.                        - Severe diverticulosis in the sigmoid colon.                        - Diverticulosis in the entire examined colon.                        - Tortuous colon.                        - There was significant looping of the colon.                        - Pseudomembranous enterocolitis, nearly resolved                        - One 3 mm polyp in the cecum, removed with a cold                        biopsy forceps. Resected and retrieved.                        - Non-bleeding hemorrhoids. Recommendation:        - Return patient to [facility] for [facility] care.                        - Continue present medications, including the vanco                        - Await pathology results.                        - Repeat colonoscopy is not recommended for                        surveillance for routine colon cancer purposes                        based on pathology results.                        - Return to referring provider as previously                        scheduled.                        - Patient has a contact number available for                        emergencies. The signs and symptoms of potential                        delayed complications were discussed with the                        patient. Return to normal activities tomorrow.                        Written discharge instructions were provided to the                        patient.                        - Resume previous diet. Procedure Code(s):     --- Professional ---                        36503, Colonoscopy, flexible; with biopsy, single                        or multiple Diagnosis Code(s):     --- Professional ---                        K64.2, Third degree hemorrhoids                        K62.89, Other specified diseases of anus and rectum                        A04.72, Enterocolitis due to Clostridium  difficile,                        not specified as recurrent                        D12.0, Benign neoplasm of cecum                        K64.4, Residual hemorrhoidal skin tags                        R10.84, Generalized abdominal pain                        K52.9, Noninfective gastroenteritis and colitis,                        unspecified                        K92.1, Melena (includes Hematochezia)                        K57.30, Diverticulosis of large intestine without                        perforation or abscess without bleeding                        R93.3, Abnormal findings on diagnostic imaging of                        other parts of digestive tract                        Q43.8, Other specified congenital malformations of                        intestine CPT copyright 2021 [employer]. All rights reserved. The codes documented in this report are preliminary and upon  review may be revised to meet current compliance requirements. Attending Participation:      I personally performed the entire procedure. Scope In: 1:43:49 PM Scope Out: 2:02:19 PM MD Heather Sainz MD 12/30/2024 2:10:14 PM This report has been signed electronically by Heather Carroll MD Number of Addenda: 0 Note Initiated On: 12/30/2024 1:24 PM Estimated Blood Loss:      Estimated blood loss: none.    CT abdomen pelvis wo IV contrast    Result Date: 12/27/2024  * * *Final Report* * * DATE OF EXAM: Dec 27 2024  7:21PM   [facility] [place]   0529  -  CT FLANK WO IVCON  / ACCESSION #  [id number] PROCEDURE REASON: flank pain, stent in place      * * * * Physician Interpretation * * * * RESULT: EXAMINATION:   CT ABDOMEN AND PELVIS WITHOUT IV CONTRAST (Renal stone protocol) CLINICAL HISTORY: Flank pain.  Left ureteral stent.  Remote history of lymphoma.  Abnormal mass lesions recent CT abdomen and pelvis. TECHNIQUE: Non-contrast imaging of the abdomen and pelvis was performed through the urinary tract.  Study performed  without intravenous or oral contrast to evaluate for urinary tract calculus. MQ:  CTAbdPelvF_1 Contrast: IV contrast: None Oral contrast: None CT Radiation dose: Integrated dose-length product (DLP) for this visit  =  287 mGy*cm. CT Dose Reduction Employed: Automated exposure control(AEC) and iterative recon COMPARISON: CT abdomen pelvis without contrast 12/07/2024 RESULT: Limitations:   Unenhanced imaging is limited for the evaluation of some renal and other intra-abdominal and pelvic pathology. Urinary Tract: Right kidney and ureter: No acute findings Left kidney and ureter: Resolved left hydroureteronephrosis status post placement left ureteral stent.  No new abnormality. Bladder: Small-volume today unremarkable.  New left ureteral stent. Abdomen and Pelvis: Liver: Normal size and nonfatty Biliary: Central biliary ductal dilation status post cholecystectomy not obviously progressive and difficult to see the absence of IV contrast Spleen: No splenomegaly. Pancreas: No peripancreatic inflammatory stranding Adrenals: No change GI Tract:  Nonobstructive.  New generalized mild colonic wall thickening and new low pelvic rectosigmoid pericolonic hazy stranding and fascial edema. Lymph Nodes: Progressive enlargement pathologic left periaortic and bilateral iliac lymph nodes. Mesentery/peritoneum: No ascites. Vasculature: No aortoiliac aneurysm Pelvis: New perirectal sigmoid hazy stranding and fascial thickening.   Uterus absent.  Bladder unremarkable and not overdistended. Bones and Soft Tissues: 1.  Compared to 08/16/2024 new osseous destruction of the L4-5 left posterolateral postoperative osseous fusion associated with a soft tissue mass. 2.  Compared to the same exam new cortical lytic lesions proximal right femoral metaphysis medially and laterally, medial right ischial cortex at the acetabulum, and central right inferior pubic ramus posteriorly.   These findings new or progressive compared to 12/07/2024. 3.  Larger  deep subcutaneous nodule upper left upper gluteal region compared to 12/07/2024, new compared 08/16/2024 4.  Larger left gluteal intermuscular nodules subjacent to the gluteus marietta compared to 12/07/2024, new compared to 08/16/2024 Lower thorax: Larger nodules/nodular consolidations both lung bases compared to 12/07/2024.  Not cavitary.  Smaller small right pleural effusion, similar trace left pleural effusion.. Localizer images: Frontal and lateral images.  New left ureteral stent.   Bowel gas pattern nonobstructive.  Improved basilar expansion and aeration.  Preexistent lumbosacral surgical hardware.  Right upper abdominal surgical clips.  External artifacts    IMPRESSION: 1.  Resolved left hydroureteronephrosis status post ureteral stent placement.  No new left upper tract abnormality.  No acute right renal abnormality 2.  New probable mild pancolitis, most pronounced distal rectosigmoid 3.  Progressive destructive bone findings postoperative lower lumbar spine and right femur, right hemipelvis as described comparing to 12/07/2024, the lesions new compared to 08/16/2024.  Includes soft tissue mass associated with the destructive lesion left L4-5 posterolateral postoperative osseous fusion. 4.  Progressive pulmonary nodules compared to 12/07/2024 5.  Progressive adenopathy compared to 12/07/2024 6.  Left buttock larger subcutaneous nodule and larger gluteal intermuscular nodules compared to 12/07/2024 7.  Dilated central biliary ducts not unexpected post cholecystectomy and not obviously progressive Transcribed Using Voice Recognition Transcribe Date/Time: Dec 27 2024  9:14P Dictated by: ROCHELLE ENAMORADO MD This examination was interpreted and the report reviewed and electronically signed by: ROCHELLE ENAMORADO MD on Dec 27 2024 10:01PM  EST    CT head wo IV contrast    Result Date: 12/16/2024  * * *Final Report* * * DATE OF EXAM: Dec 16 2024  4:39PM   [facility]   0504  -  CT BRAIN WO IVCON  / ACCESSION #  [id number] PROCEDURE  REASON: Dizziness, non-specific      * * * * Physician Interpretation * * * *  EXAMINATION: CT BRAIN WO IVCON CLINICAL HISTORY: Lymphoma. TECHNIQUE:  Serial axial images without IV contrast were obtained from the vertex to the foramen magnum. MQ:  CTBWO_3 CT Radiation dose: Integrated Dose-Length Product (DLP) for this visit =   778  mGy*cm CT Dose Reduction Employed: Iterative recon COMPARISON: September 28, 2024. RESULT: Post-operative change: None. Acute change: No evidence of an acute infarct or other acute parenchymal process. Hemorrhage: No evidence of acute intracranial hemorrhage. ECASS hemorrhagic transformation score: Not Applicable Mass Lesion / Mass Effect: There is no evidence of an intracranial mass or extraaxial fluid collection. No significant mass effect. Chronic change: Scattered patchy foci of low attenuation are present within supratentorial white matter which is a nonspecific finding but likely represents mild microvascular ischemia. Parenchyma: There is mild generalized volume loss. The brain parenchyma is otherwise within normal limits for age. Ventricles: The ventricles are within normal limits of size and configuration for age. Paranasal sinuses and skull base: The visualized paranasal sinuses are grossly clear. The skull base and imaged soft tissues are unremarkable. Localizer images: No additional findings.    IMPRESSION: No acute intracranial abnormality. : [facility]   Transcribe Date/Time: Dec 16 2024  5:28P Dictated by : GEORGE BOO MD This examination was interpreted and the report reviewed and electronically signed by:   GEORGE BOO MD on Dec 16 2024  5:30PM  EST    XR chest 2 views    Result Date: 12/12/2024  * * *Final Report* * * DATE OF EXAM: Dec 12 2024  8:59AM   MMX   [id number]  -  XR CHEST 2V FRONTAL/LAT  / ACCESSION #  [id number] PROCEDURE REASON: Other      * * * * Physician Interpretation * * * *  XR CHEST 2V FRONTAL/LAT Exam Date/Time:  12/12/2024 8:59 AM  Clinical History:  Pneumothorax status post lung biopsy Comparison:  Multiple previous studies most recent dated 12/11/2024 RESULT: LINES, TUBES, AND DEVICES:  Right-sided chest port in satisfactory position. CARDIOMEDIASTINAL SILHOUETTE:  Stable. LUNGS AND PLEURA: Minimal blunting both costophrenic angles consistent with small bilateral pleural effusions, probable mild bibasilar atelectasis.  No pulmonary vascular congestion. No pneumothorax identified. OTHER:  N/A    IMPRESSION: No visualized pneumothorax. Small bilateral pleural effusions with mild bibasilar atelectasis. : [place]   Transcribe Date/Time: Dec 12 2024  9:31A Dictated by : DANISHA PARKER MD This examination was interpreted and the report reviewed and electronically signed by: DANISHA PARKER MD on Dec 12 2024  9:32AM  EST    XR chest 1 view    Result Date: 12/11/2024  * * *Final Report* * * DATE OF EXAM: Dec 11 2024  7:48PM   MMX   [id number]  -  XR CHEST 1V FRONTAL PORT  / ACCESSION #  [id number] PROCEDURE REASON: Pneumothorax      * * * * Physician Interpretation * * * *  EXAMINATION:  CHEST RADIOGRAPH (PORTABLE SINGLE VIEW AP) Exam Date/Time:  12/11/2024 7:48 PM CLINICAL HISTORY: Pneumothorax MQ:  XCPR_5 Comparison:  Chest radiograph from earlier the same day RESULT: Lines, tubes, and devices:  None. Lungs and pleura:  Trace residual LEFT apical pneumothorax, decreased from chest radiograph earlier the same day.  LEFT greater than RIGHT bibasilar atelectasis with multiple pulmonary nodules similar to prior. Cardiomediastinal silhouette:  Nonenlarged cardiac silhouette.   Atherosclerosis is seen in the thoracic aorta. Other:  No acute osseous findings.    IMPRESSION: Trace residual LEFT apical pneumothorax, decreased from chest radiograph earlier the same day. : [place]   Transcribe Date/Time: Dec 11 2024  9:42P Dictated by : RAYMOND ALVAREZ MD This examination was interpreted and the report reviewed and electronically signed by:  RAYMOND ALVAREZ MD on Dec 11 2024  9:46PM  EST    IR biopsy chest lung    Result Date: 12/11/2024  * * *Final Report* * * DATE OF EXAM: Dec 11 2024  2:00PM   [facility]   2010  -  CT BIOPSY LUNG  / ACCESSION #  [id number] PROCEDURE REASON: Lung nodule, > 8mm      * * * * Physician Interpretation * * * *  PROCEDURE:   CT GUIDED BIOPSY OF THE LUNG MASS HISTORY: History of diffuse large B-cell lymphoma, recent admission for shortness of breath, CT chest demonstrated multiple new pulmonary nodules, concerning for recurrence, biopsy is requested. CONSENT:  Risks, benefits, treatment options, potential complications and personnel to be involved were discussed (including the risks of radiation exposure, contrast and anesthesia administration, and any equipment needed for the procedure to ensure best possible outcome) with the patient and all questions were answered and consent was obtained prior to procedure. MEDICATION RECONCILIATION:  The patient's medications and allergies were reviewed in the electronic medical record and reconciled to the proposed procedure/treatment. CLAUDIO-PROCEDURE DISCUSSION:  The appropriate elements of the pre-procedure discussion, safety check list and sign-out were performed. TIME OUT:  A time out was performed immediately prior to procedure start with the nursing,  and interventional team, correctly identifying the name, date of birth, procedure, anatomy (including marking of site and side if applicable), patient position, procedure consent form, relevant diagnostic and radiology test results, antibiotic administration if applicable, safety precautions, and procedure-specific equipment needs. Start of procedure: 1:20 PM End of procedure: 1:45 PM Patient position:  Modified left lateral decubitus Anesthesia:  After establishing pulse oximetry, BP and EKG monitoring by the Radiology nurse, moderate sedation with Versed and Fentanyl was administered. Intra-service time (monitoring for moderate sedation):   20 minutes Patient monitoring:  I personally supervised and directed an independent trained observer who assisted in monitoring the patient?s level of consciousness and physiological status throughout the procedure. Local anesthesia:  1 % lidocaine ANTIBIOTICS:  None Antibiotic infusion start time: N/A ADDITIONAL MED:  None ADDITIONAL MED:  None  ADDITIONAL MED:  None Dose-Length Product (DLP): 851.12 mGy*cm. CT Dose Reduction Employed: TECHNIQUE: After reviewing a previous CT chest a pleural-based left upper lobe nodule was selected as the biopsy target. Limited High-resolution non-contrast-enhanced helical CT of the chest was performed. Mass was localized under CT and skin overlying the mass was marked. The patient was prepped and draped using all elements of maximal sterile barrier technique (cap, mask, sterile gown, sterile gloves, a large sterile sheet, hand hygiene and cutaneous antisepsis), sterile ultrasound gel and sterile ultrasound probe covers..  After local anesthesia, a 17-gauge Bard Federico guide needle was placed into the lung parenchyma adjacent to the mass under CT guidance.  Through the guiding needle, 18-gauge Bard max core biopsy needle was placed.  4 passes and 3 core biopsy was obtained and sent to pathology in normal saline.  Needle guide was removed and hemostasis was obtained by direct manual compression.  Post biopsy CT of the chest was then obtained. RESULT: Pre-biopsy CT of the chest demonstrated the targeted pleural-based left upper lobe nodule.  Post biopsy CT of the chest demonstrate a sleeve of localized pneumothorax at the site of biopsy without extension and no evidence of hemorrhage. The patient tolerated the procedure well.  There were no significant complications and no other complications during the procedure. CONCLUSION:  The patient was comfortable and was transferred to the recovery room in stable condition. Patient was observed for 2 hours in recovery area and follow-up chest  x-rays demonstrated no evidence of pneumothorax. Estimated Blood Loss: Minimal Number and Type of Removed Specimens:  3:  Surgical pathology ATTENDING RADIOLOGIST:    Maria T Carpenter M.D. ASSISTANT:  None The procedure was performed by the: attending radiologist, without an assistant. The attending radiologist performed the following procedural activities: Entire procedure    IMPRESSION: UNEVENTFUL CT GUIDED LEFT-SIDED LUNG NODULE BIOPSY, AS DETAILED ABOVE. : [facility]   Transcribe Date/Time: Dec 11 2024  4:50P Dictated by : MARIA T CARPENTER MD This examination was interpreted and the report reviewed and electronically signed by: MARIA T CARPENTER MD on Dec 11 2024  4:56PM  EST    XR chest 1 view    Result Date: 12/11/2024  * * *Final Report* * * DATE OF EXAM: Dec 11 2024  4:04PM   MMX   [id number]  -  XR CHEST 1V FRONTAL PORT  / ACCESSION #  [id number] PROCEDURE REASON: Post-operative/post-procedure assessment      * * * * Physician Interpretation * * * *  EXAMINATION:   XR CHEST 1V FRONTAL PORT Clinical History:  2 hr post op.   Post-operative/post-procedure assessment. Two hour post-op, please do at 1545. Comparison:  Same day to 40 4:00 PM along with same day biopsy images. RESULT: Lines, tubes, and devices:  Stable chest wall port catheter. Lungs and pleura:  There is now a tiny LEFT apical pneumothorax visible with 4 mm apical pleural separation.  Mild scarring/atelectasis in RIGHT lung.  Biopsied LEFT lung lesion better seen on same-day chest CT. Cardiomediastinal silhouette:  Stable. Other: -    IMPRESSION: Tiny LEFT apical pneumothorax. : [facility]   Transcribe Date/Time: Dec 11 2024  4:16P Dictated by : ISAK AWAN MD This examination was interpreted and the report reviewed and electronically signed by: ISAK AWAN MD on Dec 11 2024  4:22PM  EST    XR chest 1 view    Result Date: 12/11/2024  * * *Final Report* * * DATE OF EXAM: Dec 11 2024  2:59PM   MMX   [id number]  -  XR CHEST 1V FRONTAL  PORT  / ACCESSION #  [id number] PROCEDURE REASON: Post-operative/post-procedure assessment      * * * * Physician Interpretation * * * *  EXAMINATION:   XR CHEST 1V FRONTAL PORT Clinical History:  post op.   Post-operative/post-procedure assessment. Please perform at 1445.  Status post biopsy of LEFT lung lesion Comparison:  Same day biopsy images.  Chest CT from 12/07/2024.  Chest x-ray from 12/07/2024. RESULT: Lines, tubes, and devices:  RIGHT chest wall port catheter again seen. Lungs and pleura:  No evidence of pneumothorax.  Mild atelectasis LEFT lung base.  Biopsied nodule better seen on prior cross-sectional imaging.   Cardiomediastinal silhouette:  Normal. Other: -    IMPRESSION: No evidence of pneumothorax after biopsy. : [facility]   Transcribe Date/Time: Dec 11 2024  3:32P Dictated by : ISAK AWAN MD This examination was interpreted and the report reviewed and electronically signed by: ISAK AWAN MD on Dec 11 2024  3:34PM  EST        Assessment/Plan   Assessment & Plan  DLBCL (diffuse large B cell lymphoma)    Simi Sandoval is an 89-year-old F with PMH DLBCL of left breast, A-fib (on Eliquis), pulmonary hypertension, Hysterectomy, Thyroid disease,  CHF, NSTEMI, s/p uretral stent (12/13) who comes in for evaluation of left flank pain, left  sided abdominal pain  and diarrhea at OSH on 12/27. Colonoscopy completed 12/30 and was found to have Cdiff colitis  with pancolitis. Pt was started on Vanco for Pancolitis and was treated with zosyn for aspiration pneumonia seen on CT A/P S/P colonoscopy. CT imaging found multiple pulmonary nodules bilaterally, largest dependently on the left measuring 2.6 cm. Biopsy of left lung nodule done 12/11/2024 showed diffuse aggressive large B-cell lymphoma, recurrent by history. Heme/Onc saw the pt at OSH and recommended to transfer to [facility] for further work up, evaluation and treatment of disease recurrence.    ONC   #Diffuse large B-cell lymphoma    -Last treated with Tafasitamab 5/16/2023   -New lung nodules favoring recurrence of lymphoma back in December.    -last chemo in 2023   -Pathology from lung nodule biopsy 12/11/2024 confirmed disease recurrence .  -CT Flank 12/27/2024 shows disease progression in pulmonary nodules and new cortical lytic lesions in L4-5.   -S/p Dexamethasone 4 mg BID at OSH by Heme/Onc and ordered MRI per Oncology at OSH on admission to rule out cord compression. MRI pending.    HEME  #Leukocytosis  -WBC 25.4 on admit  -most likely secondary to colitis  #Anemia  -Hgb 10 on admit  -Transfuse if Hgb<7  -Monitor CBC daily    PULM  #Acute hypoxic Resp failure    #Aspiration Pneumonia  -secondary to HCAP (Patient likely with aspiration pneumonia status post colonoscopy on 12/30/2024.  She was supine laying on her left side which is where the infiltrate was shown on chest x-ray.  Concern for aspiration during procedure)   -S/p 2L NC at OSH and cont on admit  -wean as tolerated  -Cont with Duoneb  -finished 7 day course of Zosyn at OSH  -CXR on admission shows with mild-to-moderate degree of pulmonary interstitial edema.Small left  and right pleural effusion.  -S/p bipap for acute hypoxic respiratory failure at OSH  -S/p intubation in 11/2024 for pneumonia and CHF exacerbation.    FEN/GI  #GURINDER (resolved)   -sCr 1.01 on admit  -Monitor CMP daily  #C. diff colitis with pan colitis  (Resolving)  -CT A/P shows mild pancolitis on distal rectosigmoid and pseudomembranes colitis on Colonoscopy (12/30)   -S/p PO Vanco at OSH and continue po vancomycin to 125mg qid on admit with last doses 1/13/25.   #Hypercalcemia  -sCa 12.6 on admit  -started on NS IVF at 75ml/hr  #Hyponatremia  -On IVF NS on admit  -Monitor daily    URO  #Left hydronephrosis ,Urinary Retention s/p Left ureteral stent placed on 12/13/24   -seen by Urology at OSH  -S/p maki removed on 1/5   -Voiding fine on admit    CARDS   -LVEF 57%. Vegetation noted on prior echo.  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 She denies any increase or change in her neuropathy symptoms. Per Neurology at OSH  -continue supportive measures    -no further neurological testing needed at this point      MSK   #Lumbar Spinal stenosis    -started on Oxy 5 mg PRN and cont with Robaxin 500 mg TID PRN    PSYCH   #Anxiety   -c/w Buspar 7.5 mg TID and Cymbalta    ENDO   #Hyperthyroidism   -c/w methimazole     Dispo  -Full code  -R Med port  -Primary Oncologist Dr Mac    I spent 50 minutes in the professional and overall care of this patient.      Jimena Steele, APRN-CNP

## 2025-01-09 NOTE — CONSULTS
"Nutrition Initial Assessment:   Nutrition Assessment    --->Reason for Assessment: Admission nursing screening    Patient is a 89 y.o. female with h/o DLBCL of L breast, transferred to this facility from OSH after being found to have recurrence of disease.    Nutrition History:  This service met with pt this afternoon, pt laying in bed at time of visit with her.    Tells appetite has been decreased for ~6 mos now, does try to eat 2-3 times/day, though usually just smaller amounts at a time. Always tries to drink at least 1 Ensure supplement each day.    Since admit, denied any n/v or trouble chewing/swallowing. Of note, pt with with h/o aspiration PNA at OSH--\"Patient likely with aspiration pneumonia status post colonoscopy on 12/30/2024. She was supine laying on her left side which is where the infiltrate was shown on chest x-ray. Concern for aspiration during procedure.\"    Also noted to be C diff + at OSH, tells she thinks her diarrhea is improving. Appetite remains decreased, was noted to be drinking and Ensure Plus High Protein shake during visit with her, tells she brought it from the OSH.    --Vitamin/Herbal Supplement Use: per review of EMR/home meds: magnesium, melatonin  --Food Allergies/Intolerances: none       Anthropometrics:  Height: 162.6 cm (5' 4\")   Weight: 64 kg (141 lb)   BMI (Calculated): 24.19  IBW/kg (Dietitian Calculated): 54.5 kg  Percent of IBW: 117 %       Weight History:   --Pt tells her weight has been variable, unsure as to how much wt she has lost recently.    Wt Readings per review of EMR:   01/08/25 64 kg (141 lb) (current wt)   01/08/25 64 kg (141 lb)   10/08/24 65.4 kg (144 lb 2.9 oz)--->2% wt loss from this date to present (not significant)   07/15/24 68 kg (150 lb)--->6% wt loss from this date to present (not significant)   06/11/24 68 kg (150 lb)   04/04/24 68 kg (150 lb)   03/05/24 64.9 kg (143 lb)   01/02/24 64.5 kg (142 lb 1.6 oz)   12/04/23 69.6 kg (153 lb 6.3 oz)---->8% wt " loss from this date to present (not significant)   10/31/23 68.7 kg (151 lb 7.3 oz)   06/27/23 72.3 kg (159 lb 6.3 oz)   03/21/23 72.5 kg (159 lb 13.3 oz)   03/07/23 72.6 kg (160 lb 0.9 oz)   02/28/23 71.7 kg (158 lb 1.1 oz)   02/21/23 72.2 kg (159 lb 2.8 oz)     Nutrition Focused Physical Exam Findings:  Subcutaneous Fat Loss:   Orbital Fat Pads: Severe (dark circles, hollowing and loose skin)  Buccal Fat Pads: Severe (hollow, sunken and narrow face)  Triceps: Severe (negligible fat tissue)  Ribs: Defer  Muscle Wasting:  Temporalis: Severe (hollowed scooping depression)  Pectoralis (Clavicular Region): Severe (protruding prominent clavicle)  Deltoid/Trapezius: Severe (squared shoulders, acromion process prominent)  Interosseous: Severe (depressed area between thumb and forefinger)  Trapezius/Infraspinatus/Supraspinatus (Scapular Region): Defer  Quadriceps: Defer  Gastrocnemius: Defer  Edema:  Edema: none  Physical Findings:  Hair: Negative  Skin: Negative    Nutrition Significant Labs:  CBC Trend:   Results from last 7 days   Lab Units 01/09/25  0602 01/08/25 2047   WBC AUTO x10*3/uL 20.2* 25.4*   RBC AUTO x10*6/uL 2.29* 2.80*   HEMOGLOBIN g/dL 9.0* 10.0*   HEMATOCRIT % 22.2* 27.0*   MCV fL 97 96   PLATELETS AUTO x10*3/uL 407 400   BMP Trend:   Results from last 7 days   Lab Units 01/09/25  0602 01/08/25 2047   GLUCOSE mg/dL 122* 182*   CALCIUM mg/dL 12.2* 12.6*   SODIUM mmol/L 135* 132*   POTASSIUM mmol/L 4.6 3.8   CO2 mmol/L 27 25   CHLORIDE mmol/L 99 99   BUN mg/dL 20 21   CREATININE mg/dL 0.92 1.01   A1C:  Lab Results   Component Value Date    HGBA1C 5.6 09/26/2022   BG POCT trend:   Results from last 7 days   Lab Units 01/09/25  0912   POCT GLUCOSE mg/dL 123*   Liver Function Trend:   Results from last 7 days   Lab Units 01/09/25  0602 01/08/25 2047   ALK PHOS U/L 55 60   AST U/L 13 11   ALT U/L 12 10   BILIRUBIN TOTAL mg/dL 0.3 0.3   Renal Lab Trend:   Results from last 7 days   Lab Units 01/09/25  0602  01/08/25 2047   POTASSIUM mmol/L 4.6 3.8   SODIUM mmol/L 135* 132*   MAGNESIUM mg/dL  --  1.74   EGFR mL/min/1.73m*2 60* 54*   BUN mg/dL 20 21   CREATININE mg/dL 0.92 1.01   Vit D:   Lab Results   Component Value Date    VITD25 42 01/09/2025     Medications:  Scheduled medications  amiodarone, 200 mg, oral, Daily  amLODIPine, 5 mg, oral, Daily  apixaban, 5 mg, oral, BID  busPIRone, 7.5 mg, oral, TID  dexAMETHasone, 4 mg, oral, q12h VERONICA  DULoxetine, 20 mg, oral, Daily  furosemide, 40 mg, intravenous, Once  lidocaine, 1 patch, transdermal, Daily  magnesium oxide, 400 mg, oral, q12h VERONICA  methIMAzole, 2.5 mg, oral, Daily  metoprolol succinate XL, 100 mg, oral, Daily  pantoprazole, 40 mg, oral, Daily before breakfast  rOPINIRole, 0.5 mg, oral, Nightly  vancomycin, 125 mg, oral, 4x daily    Continuous medications  sodium chloride 0.9%, 75 mL/hr, Last Rate: 75 mL/hr (01/09/25 0608)  sodium chloride 0.9%, 100 mL/hr, Last Rate: 100 mL/hr (01/09/25 0906)    PRN medications  PRN medications: alteplase, ipratropium-albuteroL, melatonin, methocarbamol, moisturizing mouth, oxyCODONE    I/O:   Last BM Date: 01/08/25    Dietary Orders (From admission, onward)       Start     Ordered    01/09/25 1244  Oral nutritional supplements  Until discontinued        Question Answer Comment   Deliver with All meals    Select supplement: Ensure Plus        01/09/25 1243    01/08/25 2134  May Participate in Room Service  ( ROOM SERVICE MAY PARTICIPATE)  Once        Question:  .  Answer:  Yes    01/08/25 2133 01/08/25 1929  Adult diet Low microbial  Diet effective now        Question:  Diet type  Answer:  Low microbial    01/08/25 1933                Estimated Needs:   Total Energy Estimated Needs (kCal): ~0545-7441  Method for Estimating Needs: MSJ (1067) x ~1.3-1.5  Total Protein Estimated Needs (g): 65+  Method for Estimating Needs: 55 (IBW) x ~1.2g/kg+  Total Fluid Estimated Needs (mL): 1ml/kcal or per MD/team        Nutrition Diagnosis    Malnutrition Diagnosis  Patient has Malnutrition Diagnosis: Yes  Diagnosis Status: New  Malnutrition Diagnosis: Severe malnutrition related to chronic disease or condition (CA, CHF)  As Evidenced by: pt estimated to be consuming <75% estimated energy needs x >1 month with areas of severe muscle and fat losses noted during nutrition exam    Additional Assessment Information: Muscle and fat losses, likely at least in part, a factor of age, though considering decreased PO, do feel pt presenting with severe malnutrition.     Do feel she benefits from ongoing use of oral nutrition supplements in-house, considering malnutrition, and is agreeable to Ensure Plus at this facility.       Nutrition Interventions/Recommendations     1. Continue Low Microbial Diet, only as tolerated  --If c/f aspiration, recommend formal SLP eval  --RDN placed order for Ensure Plus TID for added nutrition    2. Daily weights (standing preferred, if feasible)        Nutrition Prescription for Oral Nutrition: ~7145-0370 kcals/day, 65+ g pro/day        Nutrition Interventions:   Interventions: Meals and snacks, Medical food supplement  Meals and Snacks: General healthful diet  Goal: Low Microbial Diet  Medical Food Supplement: Commercial beverage  Goal: Ensure Plus TID (350 kcals, 13g pro each)    Nutrition Education: Pt denied any questions for RDN--wanting to take a nap.       Nutrition Monitoring and Evaluation   Food/Nutrient Related History Monitoring  Monitoring and Evaluation Plan: Amount of food  Amount of Food: Estimated amout of food, Medical food intake  Criteria: consume 50% or more of meals/snacks/supplements    Body Composition/Growth/Weight History  Monitoring and Evaluation Plan: Weight  Weight: Measured weight  Criteria: maintain stable wt    Biochemical Data, Medical Tests and Procedures  Monitoring and Evaluation Plan: Electrolyte/renal panel, Glucose/endocrine profile  Electrolyte and Renal Panel: Magnesium, Phosphorus,  Potassium, Sodium  Criteria: WNL  Glucose/Endocrine Profile: Glucose, casual, Glucose, fasting  Criteria: WNL          Time Spent (min): 45 minutes

## 2025-01-10 LAB
1,25(OH)2D SERPL-MCNC: 57.6 PG/ML (ref 19.9–79.3)
ACANTHOCYTES BLD QL SMEAR: NORMAL
ALBUMIN SERPL BCP-MCNC: 2.5 G/DL (ref 3.4–5)
ALBUMIN SERPL BCP-MCNC: 2.6 G/DL (ref 3.4–5)
ALBUMIN SERPL BCP-MCNC: 2.7 G/DL (ref 3.4–5)
ALP SERPL-CCNC: 54 U/L (ref 33–136)
ALP SERPL-CCNC: 56 U/L (ref 33–136)
ALT SERPL W P-5'-P-CCNC: 15 U/L (ref 7–45)
ALT SERPL W P-5'-P-CCNC: 15 U/L (ref 7–45)
ANION GAP SERPL CALC-SCNC: 12 MMOL/L (ref 10–20)
ANION GAP SERPL CALC-SCNC: 14 MMOL/L (ref 10–20)
AST SERPL W P-5'-P-CCNC: 15 U/L (ref 9–39)
AST SERPL W P-5'-P-CCNC: 16 U/L (ref 9–39)
BASOPHILS # BLD AUTO: 0 X10*3/UL (ref 0–0.1)
BASOPHILS NFR BLD AUTO: 0 %
BILIRUB DIRECT SERPL-MCNC: 0.1 MG/DL (ref 0–0.3)
BILIRUB SERPL-MCNC: 0.3 MG/DL (ref 0–1.2)
BILIRUB SERPL-MCNC: 0.4 MG/DL (ref 0–1.2)
BUN SERPL-MCNC: 25 MG/DL (ref 6–23)
BUN SERPL-MCNC: 25 MG/DL (ref 6–23)
BURR CELLS BLD QL SMEAR: NORMAL
CALCIUM SERPL-MCNC: 11.7 MG/DL (ref 8.6–10.6)
CALCIUM SERPL-MCNC: 12.4 MG/DL (ref 8.6–10.6)
CHLORIDE SERPL-SCNC: 98 MMOL/L (ref 98–107)
CHLORIDE SERPL-SCNC: 98 MMOL/L (ref 98–107)
CO2 SERPL-SCNC: 27 MMOL/L (ref 21–32)
CO2 SERPL-SCNC: 28 MMOL/L (ref 21–32)
CREAT SERPL-MCNC: 1.04 MG/DL (ref 0.5–1.05)
CREAT SERPL-MCNC: 1.09 MG/DL (ref 0.5–1.05)
CRP SERPL-MCNC: 1.14 MG/DL
EGFRCR SERPLBLD CKD-EPI 2021: 49 ML/MIN/1.73M*2
EGFRCR SERPLBLD CKD-EPI 2021: 52 ML/MIN/1.73M*2
EOSINOPHIL # BLD AUTO: 0.04 X10*3/UL (ref 0–0.4)
EOSINOPHIL NFR BLD AUTO: 0.4 %
ERYTHROCYTE [DISTWIDTH] IN BLOOD BY AUTOMATED COUNT: 14.4 % (ref 11.5–14.5)
FERRITIN SERPL-MCNC: 321 NG/ML (ref 8–150)
GLUCOSE BLD MANUAL STRIP-MCNC: 104 MG/DL (ref 74–99)
GLUCOSE SERPL-MCNC: 114 MG/DL (ref 74–99)
GLUCOSE SERPL-MCNC: 121 MG/DL (ref 74–99)
HCT VFR BLD AUTO: 23.8 % (ref 36–46)
HGB BLD-MCNC: 9.2 G/DL (ref 12–16)
IMM GRANULOCYTES # BLD AUTO: 0.08 X10*3/UL (ref 0–0.5)
IMM GRANULOCYTES NFR BLD AUTO: 0.8 % (ref 0–0.9)
LDH SERPL L TO P-CCNC: 241 U/L (ref 84–246)
LYMPHOCYTES # BLD AUTO: 0.06 X10*3/UL (ref 0.8–3)
LYMPHOCYTES NFR BLD AUTO: 0.6 %
MAGNESIUM SERPL-MCNC: 1.57 MG/DL (ref 1.6–2.4)
MAGNESIUM SERPL-MCNC: 1.68 MG/DL (ref 1.6–2.4)
MCH RBC QN AUTO: 36.5 PG (ref 26–34)
MCHC RBC AUTO-ENTMCNC: 38.7 G/DL (ref 32–36)
MCV RBC AUTO: 94 FL (ref 80–100)
MONOCYTES # BLD AUTO: 0.54 X10*3/UL (ref 0.05–0.8)
MONOCYTES NFR BLD AUTO: 5.5 %
NEUTROPHILS # BLD AUTO: 9.02 X10*3/UL (ref 1.6–5.5)
NEUTROPHILS NFR BLD AUTO: 92.7 %
NRBC BLD-RTO: 0 /100 WBCS (ref 0–0)
OVALOCYTES BLD QL SMEAR: NORMAL
PHOSPHATE SERPL-MCNC: 3 MG/DL (ref 2.5–4.9)
PHOSPHATE SERPL-MCNC: 3.2 MG/DL (ref 2.5–4.9)
PLATELET # BLD AUTO: 442 X10*3/UL (ref 150–450)
POLYCHROMASIA BLD QL SMEAR: NORMAL
POTASSIUM SERPL-SCNC: 4 MMOL/L (ref 3.5–5.3)
POTASSIUM SERPL-SCNC: 4.3 MMOL/L (ref 3.5–5.3)
PROT SERPL-MCNC: 4.7 G/DL (ref 6.4–8.2)
PROT SERPL-MCNC: 4.8 G/DL (ref 6.4–8.2)
RBC # BLD AUTO: 2.52 X10*6/UL (ref 4–5.2)
RBC MORPH BLD: NORMAL
SODIUM SERPL-SCNC: 134 MMOL/L (ref 136–145)
SODIUM SERPL-SCNC: 135 MMOL/L (ref 136–145)
TARGETS BLD QL SMEAR: NORMAL
URATE SERPL-MCNC: 7.2 MG/DL (ref 2.3–6.7)
WBC # BLD AUTO: 19.5 X10*3/UL (ref 4.4–11.3)

## 2025-01-10 PROCEDURE — 82947 ASSAY GLUCOSE BLOOD QUANT: CPT

## 2025-01-10 PROCEDURE — 2500000005 HC RX 250 GENERAL PHARMACY W/O HCPCS

## 2025-01-10 PROCEDURE — 2500000002 HC RX 250 W HCPCS SELF ADMINISTERED DRUGS (ALT 637 FOR MEDICARE OP, ALT 636 FOR OP/ED)

## 2025-01-10 PROCEDURE — 82728 ASSAY OF FERRITIN: CPT

## 2025-01-10 PROCEDURE — 84550 ASSAY OF BLOOD/URIC ACID: CPT | Performed by: NURSE PRACTITIONER

## 2025-01-10 PROCEDURE — 2500000004 HC RX 250 GENERAL PHARMACY W/ HCPCS (ALT 636 FOR OP/ED): Mod: TB

## 2025-01-10 PROCEDURE — 82040 ASSAY OF SERUM ALBUMIN: CPT

## 2025-01-10 PROCEDURE — 84075 ASSAY ALKALINE PHOSPHATASE: CPT

## 2025-01-10 PROCEDURE — 83735 ASSAY OF MAGNESIUM: CPT

## 2025-01-10 PROCEDURE — 2500000001 HC RX 250 WO HCPCS SELF ADMINISTERED DRUGS (ALT 637 FOR MEDICARE OP)

## 2025-01-10 PROCEDURE — 2500000001 HC RX 250 WO HCPCS SELF ADMINISTERED DRUGS (ALT 637 FOR MEDICARE OP): Performed by: INTERNAL MEDICINE

## 2025-01-10 PROCEDURE — 84550 ASSAY OF BLOOD/URIC ACID: CPT

## 2025-01-10 PROCEDURE — 2500000004 HC RX 250 GENERAL PHARMACY W/ HCPCS (ALT 636 FOR OP/ED)

## 2025-01-10 PROCEDURE — 84100 ASSAY OF PHOSPHORUS: CPT

## 2025-01-10 PROCEDURE — 85025 COMPLETE CBC W/AUTO DIFF WBC: CPT

## 2025-01-10 PROCEDURE — 86140 C-REACTIVE PROTEIN: CPT

## 2025-01-10 PROCEDURE — 82248 BILIRUBIN DIRECT: CPT

## 2025-01-10 PROCEDURE — 80076 HEPATIC FUNCTION PANEL: CPT | Mod: CCI

## 2025-01-10 PROCEDURE — 83615 LACTATE (LD) (LDH) ENZYME: CPT

## 2025-01-10 PROCEDURE — 2500000004 HC RX 250 GENERAL PHARMACY W/ HCPCS (ALT 636 FOR OP/ED): Performed by: INTERNAL MEDICINE

## 2025-01-10 PROCEDURE — 99233 SBSQ HOSP IP/OBS HIGH 50: CPT | Performed by: INTERNAL MEDICINE

## 2025-01-10 PROCEDURE — 1170000001 HC PRIVATE ONCOLOGY ROOM DAILY

## 2025-01-10 RX ORDER — EPINEPHRINE 1 MG/ML
0.3 INJECTION, SOLUTION, CONCENTRATE INTRAVENOUS EVERY 5 MIN PRN
Status: ACTIVE | OUTPATIENT
Start: 2025-01-10

## 2025-01-10 RX ORDER — DIPHENHYDRAMINE HCL 50 MG
50 CAPSULE ORAL ONCE
Status: COMPLETED | OUTPATIENT
Start: 2025-01-10 | End: 2025-01-10

## 2025-01-10 RX ORDER — ALLOPURINOL 300 MG/1
300 TABLET ORAL DAILY
Status: DISPENSED | OUTPATIENT
Start: 2025-01-10

## 2025-01-10 RX ORDER — DEXAMETHASONE 4 MG/1
16 TABLET ORAL DAILY
OUTPATIENT
Start: 2025-01-18

## 2025-01-10 RX ORDER — PROCHLORPERAZINE MALEATE 10 MG
10 TABLET ORAL EVERY 6 HOURS PRN
Status: ACTIVE | OUTPATIENT
Start: 2025-01-10

## 2025-01-10 RX ORDER — FAMOTIDINE 10 MG/ML
20 INJECTION INTRAVENOUS ONCE AS NEEDED
Status: ACTIVE | OUTPATIENT
Start: 2025-01-10

## 2025-01-10 RX ORDER — ACYCLOVIR 400 MG/1
400 TABLET ORAL EVERY 12 HOURS SCHEDULED
Status: DISPENSED | OUTPATIENT
Start: 2025-01-10

## 2025-01-10 RX ORDER — DIPHENHYDRAMINE HYDROCHLORIDE 50 MG/ML
50 INJECTION INTRAMUSCULAR; INTRAVENOUS AS NEEDED
Status: ACTIVE | OUTPATIENT
Start: 2025-01-10

## 2025-01-10 RX ORDER — ONDANSETRON 4 MG/1
4 TABLET, FILM COATED ORAL EVERY 8 HOURS PRN
Status: DISCONTINUED | OUTPATIENT
Start: 2025-01-10 | End: 2025-01-12

## 2025-01-10 RX ORDER — PROCHLORPERAZINE EDISYLATE 5 MG/ML
10 INJECTION INTRAMUSCULAR; INTRAVENOUS EVERY 6 HOURS PRN
Status: ACTIVE | OUTPATIENT
Start: 2025-01-10

## 2025-01-10 RX ORDER — CALCITONIN SALMON 200 [USP'U]/ML
4 INJECTION, SOLUTION INTRAMUSCULAR; SUBCUTANEOUS EVERY 12 HOURS
Status: COMPLETED | OUTPATIENT
Start: 2025-01-10 | End: 2025-01-11

## 2025-01-10 RX ORDER — MAGNESIUM SULFATE HEPTAHYDRATE 40 MG/ML
2 INJECTION, SOLUTION INTRAVENOUS ONCE
Status: COMPLETED | OUTPATIENT
Start: 2025-01-10 | End: 2025-01-11

## 2025-01-10 RX ORDER — FLUCONAZOLE 200 MG/1
400 TABLET ORAL DAILY
Status: DISPENSED | OUTPATIENT
Start: 2025-01-10

## 2025-01-10 RX ORDER — ACETAMINOPHEN 325 MG/1
650 TABLET ORAL ONCE
Status: COMPLETED | OUTPATIENT
Start: 2025-01-10 | End: 2025-01-10

## 2025-01-10 RX ORDER — DEXAMETHASONE 4 MG/1
16 TABLET ORAL ONCE
Status: COMPLETED | OUTPATIENT
Start: 2025-01-10 | End: 2025-01-10

## 2025-01-10 RX ORDER — CALCITONIN SALMON 200 [USP'U]/ML
100 INJECTION, SOLUTION INTRAMUSCULAR; SUBCUTANEOUS 2 TIMES DAILY
Status: DISCONTINUED | OUTPATIENT
Start: 2025-01-10 | End: 2025-01-10

## 2025-01-10 RX ORDER — ALBUTEROL SULFATE 0.83 MG/ML
3 SOLUTION RESPIRATORY (INHALATION) AS NEEDED
Status: ACTIVE | OUTPATIENT
Start: 2025-01-10

## 2025-01-10 RX ADMIN — AMIODARONE HYDROCHLORIDE 200 MG: 200 TABLET ORAL at 09:15

## 2025-01-10 RX ADMIN — DEXAMETHASONE 4 MG: 4 TABLET ORAL at 09:14

## 2025-01-10 RX ADMIN — AMLODIPINE BESYLATE 5 MG: 5 TABLET ORAL at 09:14

## 2025-01-10 RX ADMIN — ONDANSETRON HYDROCHLORIDE 4 MG: 4 TABLET, FILM COATED ORAL at 05:34

## 2025-01-10 RX ADMIN — ALLOPURINOL 300 MG: 300 TABLET ORAL at 11:19

## 2025-01-10 RX ADMIN — METOPROLOL SUCCINATE 100 MG: 50 TABLET, EXTENDED RELEASE ORAL at 09:14

## 2025-01-10 RX ADMIN — APIXABAN 5 MG: 5 TABLET, FILM COATED ORAL at 09:14

## 2025-01-10 RX ADMIN — VANCOMYCIN HYDROCHLORIDE 125 MG: 125 CAPSULE ORAL at 07:57

## 2025-01-10 RX ADMIN — VANCOMYCIN HYDROCHLORIDE 125 MG: 125 CAPSULE ORAL at 13:27

## 2025-01-10 RX ADMIN — BUSPIRONE HYDROCHLORIDE 7.5 MG: 15 TABLET ORAL at 09:14

## 2025-01-10 RX ADMIN — APIXABAN 5 MG: 5 TABLET, FILM COATED ORAL at 21:26

## 2025-01-10 RX ADMIN — METHIMAZOLE 2.5 MG: 5 TABLET ORAL at 09:15

## 2025-01-10 RX ADMIN — MAGNESIUM OXIDE TAB 400 MG (241.3 MG ELEMENTAL MG) 400 MG: 400 (241.3 MG) TAB at 05:34

## 2025-01-10 RX ADMIN — Medication 6 MG: at 21:26

## 2025-01-10 RX ADMIN — METHOCARBAMOL 500 MG: 500 TABLET ORAL at 08:49

## 2025-01-10 RX ADMIN — ACYCLOVIR 400 MG: 400 TABLET ORAL at 11:19

## 2025-01-10 RX ADMIN — BUSPIRONE HYDROCHLORIDE 7.5 MG: 15 TABLET ORAL at 21:26

## 2025-01-10 RX ADMIN — MAGNESIUM OXIDE TAB 400 MG (241.3 MG ELEMENTAL MG) 400 MG: 400 (241.3 MG) TAB at 18:56

## 2025-01-10 RX ADMIN — METHOCARBAMOL 500 MG: 500 TABLET ORAL at 21:26

## 2025-01-10 RX ADMIN — VANCOMYCIN HYDROCHLORIDE 125 MG: 125 CAPSULE ORAL at 21:26

## 2025-01-10 RX ADMIN — CALCITONIN SALMON 250 UNITS: 200 INJECTION, SOLUTION INTRAMUSCULAR; SUBCUTANEOUS at 13:26

## 2025-01-10 RX ADMIN — DEXAMETHASONE 16 MG: 4 TABLET ORAL at 16:44

## 2025-01-10 RX ADMIN — VANCOMYCIN HYDROCHLORIDE 125 MG: 125 CAPSULE ORAL at 17:52

## 2025-01-10 RX ADMIN — ACETAMINOPHEN 650 MG: 325 TABLET ORAL at 16:45

## 2025-01-10 RX ADMIN — OXYCODONE 5 MG: 5 TABLET ORAL at 05:34

## 2025-01-10 RX ADMIN — PANTOPRAZOLE SODIUM 40 MG: 40 TABLET, DELAYED RELEASE ORAL at 07:57

## 2025-01-10 RX ADMIN — DIPHENHYDRAMINE HYDROCHLORIDE 50 MG: 50 CAPSULE ORAL at 16:44

## 2025-01-10 RX ADMIN — ROPINIROLE 0.5 MG: 0.5 TABLET, FILM COATED ORAL at 21:27

## 2025-01-10 RX ADMIN — BUSPIRONE HYDROCHLORIDE 7.5 MG: 15 TABLET ORAL at 15:36

## 2025-01-10 RX ADMIN — EPCORITAMAB-BYSP 0.16 MG: 4 INJECTION, SOLUTION, CONCENTRATE SUBCUTANEOUS at 17:52

## 2025-01-10 RX ADMIN — DULOXETINE HYDROCHLORIDE 20 MG: 20 CAPSULE, DELAYED RELEASE ORAL at 09:15

## 2025-01-10 RX ADMIN — ACYCLOVIR 400 MG: 400 TABLET ORAL at 21:26

## 2025-01-10 RX ADMIN — OXYCODONE 5 MG: 5 TABLET ORAL at 21:26

## 2025-01-10 RX ADMIN — FLUCONAZOLE 400 MG: 200 TABLET ORAL at 11:19

## 2025-01-10 RX ADMIN — OXYCODONE 5 MG: 5 TABLET ORAL at 12:23

## 2025-01-10 RX ADMIN — MAGNESIUM SULFATE IN WATER 2 G: 40 INJECTION, SOLUTION INTRAVENOUS at 21:27

## 2025-01-10 ASSESSMENT — COGNITIVE AND FUNCTIONAL STATUS - GENERAL
TURNING FROM BACK TO SIDE WHILE IN FLAT BAD: A LITTLE
MOBILITY SCORE: 17
STANDING UP FROM CHAIR USING ARMS: A LOT
WALKING IN HOSPITAL ROOM: A LITTLE
MOVING TO AND FROM BED TO CHAIR: A LITTLE
TOILETING: A LITTLE
MOVING TO AND FROM BED TO CHAIR: A LITTLE
PERSONAL GROOMING: A LITTLE
STANDING UP FROM CHAIR USING ARMS: A LOT
TOILETING: A LITTLE
WALKING IN HOSPITAL ROOM: A LITTLE
DRESSING REGULAR UPPER BODY CLOTHING: A LITTLE
DRESSING REGULAR UPPER BODY CLOTHING: A LITTLE
EATING MEALS: A LITTLE
PERSONAL GROOMING: A LITTLE
DAILY ACTIVITIY SCORE: 16
DRESSING REGULAR LOWER BODY CLOTHING: A LOT
CLIMB 3 TO 5 STEPS WITH RAILING: A LITTLE
DRESSING REGULAR LOWER BODY CLOTHING: A LOT
TURNING FROM BACK TO SIDE WHILE IN FLAT BAD: A LITTLE
MOBILITY SCORE: 17
HELP NEEDED FOR BATHING: A LOT
HELP NEEDED FOR BATHING: A LOT
DAILY ACTIVITIY SCORE: 16
EATING MEALS: A LITTLE
MOVING FROM LYING ON BACK TO SITTING ON SIDE OF FLAT BED WITH BEDRAILS: A LITTLE
CLIMB 3 TO 5 STEPS WITH RAILING: A LITTLE
MOVING FROM LYING ON BACK TO SITTING ON SIDE OF FLAT BED WITH BEDRAILS: A LITTLE

## 2025-01-10 ASSESSMENT — PAIN SCALES - GENERAL
PAINLEVEL_OUTOF10: 8
PAINLEVEL_OUTOF10: 9
PAINLEVEL_OUTOF10: 7
PAINLEVEL_OUTOF10: 0 - NO PAIN
PAINLEVEL_OUTOF10: 0 - NO PAIN
PAINLEVEL_OUTOF10: 8
PAINLEVEL_OUTOF10: 0 - NO PAIN

## 2025-01-10 ASSESSMENT — PAIN - FUNCTIONAL ASSESSMENT
PAIN_FUNCTIONAL_ASSESSMENT: 0-10
PAIN_FUNCTIONAL_ASSESSMENT: UNABLE TO SELF-REPORT

## 2025-01-10 ASSESSMENT — PAIN DESCRIPTION - LOCATION: LOCATION: GENERALIZED

## 2025-01-10 NOTE — HOSPITAL COURSE
Simi Sandoval is an 89-year-old F with PMH DLBCL of left breast, A-fib (on Eliquis), pulmonary hypertension, Hysterectomy, Thyroid disease,  CHF, NSTEMI, s/p uretral stent (12/13) who comes in for evaluation of left flank pain, left  sided abdominal pain  and diarrhea at OSH on 12/27. Colonoscopy completed 12/30 and was found to have Cdiff colitis  with pancolitis. Pt was started on Vanco for Pancolitis and was treated with zosyn for aspiration pneumonia seen on CT A/P S/P colonoscopy. CT imaging found multiple pulmonary nodules bilaterally, largest dependently on the left measuring 2.6 cm. Biopsy of left lung nodule done 12/11/2024 showed diffuse aggressive large B-cell lymphoma, recurrent by history. Heme/Onc saw the pt at OSH and recommended to transfer to [facility] for further work up, evaluation and treatment of disease recurrence.    Hospital course c/b:  DLBCL. She was noted to have worsening pulmonary nodules and lytic lesions to the spine at L4-5. A previous lung node biopsy was done in at OSH on 12/11/24 that was positive for disease. She was having significant pain in lower back and lower extremities, so an MRI was obtained to rule out cord compression and it was negative. She was started on epcoritamab on 1/10 and tolerated the first dose. Patient developed AMS 1/16 and was started on dex with some improvement (A+Ox3) but still somewhat confused. Epkinly held while GOC discussed.    Hypercalcemia of malignancy. She presented with a Ca+ level of 12.6 and was started cautiously (hx of CHF) on low maintenance IV fluids on admission. Her Ca+ level remained elevated and on 1/9 received a 4mg dose of zometa. On 1/10, IVF were stopped and she was given calcitonin BID x4 doses (1/10-1/12). Her Ca+ levels eventually normalized.  C. Diff colitis/pancolitis. She had a colonoscopy on 12/30 at OSH that confirmed the colitis and she was started on PO vancomycin. She continued on vancomycin until 1/13/25.   Aspiration  Pneumonia. She was noted to have pneumonia at OSH and was treated with 7 day course of IV zosyn. This was attributed to an aspiration event during the colonoscopy that she had done. She was on 2L NC due to the pneumonia (does not wear oxygen at basline) and eventually the oxygen requirements were increased to 4L with suspicion for possible aspiration PNA again, and patient restarted on zosyn.   UTI. UA/UC 1/13 positive for e coli. Patient already on zosyn for suspected PNA.     GOC discussion held 1/18/25, and patient and patient's family elected to proceed with comfort care and hospice. Hospice consult placed. Patient made DNR-CC on 1/19 after discussion with patient's sons Ethan and Ken. Patient discharged to ***

## 2025-01-10 NOTE — PROGRESS NOTES
Physical Therapy                 Therapy Communication Note    Patient Name: Simi Sandoval  MRN: [id number]  Department: [facility]  Room: [id number]-A  Today's Date: 1/10/2025     Discipline: Physical Therapy    PT Missed Visit: Yes     Missed Visit Reason: Missed Visit Reason: Patient placed on medical hold (Pt is pending lower extremity venous duplex left to assess for DVT. Will re-attempt following US.)    Missed Time: Attempt

## 2025-01-10 NOTE — CARE PLAN
Problem: Respiratory  Goal: Clear secretions with interventions this shift  Outcome: Progressing  Goal: Minimize anxiety/maximize coping throughout shift  Outcome: Progressing  Goal: Minimal/no exertional discomfort or dyspnea this shift  Outcome: Progressing  Goal: No signs of respiratory distress (eg. Use of accessory muscles. Peds grunting)  Outcome: Progressing  Goal: Patent airway maintained this shift  Outcome: Progressing  Goal: Tolerate mechanical ventilation evidenced by VS/agitation level this shift  Outcome: Progressing  Goal: Tolerate pulmonary toileting this shift  Outcome: Progressing  Goal: Verbalize decreased shortness of breath this shift  Outcome: Progressing  Goal: Wean oxygen to maintain O2 saturation per order/standard this shift  Outcome: Progressing  Goal: Increase self care and/or family involvement in next 24 hours  Outcome: Progressing     Problem: Fall/Injury  Goal: Not fall by end of shift  Outcome: Progressing  Goal: Be free from injury by end of the shift  Outcome: Progressing  Goal: Verbalize understanding of personal risk factors for fall in the hospital  Outcome: Progressing  Goal: Verbalize understanding of risk factor reduction measures to prevent injury from fall in the home  Outcome: Progressing  Goal: Use assistive devices by end of the shift  Outcome: Progressing  Goal: Pace activities to prevent fatigue by end of the shift  Outcome: Progressing     Problem: Infection related to problem list condition  Goal: Infection will resolve through treatment  Outcome: Progressing     Problem: Skin  Goal: Decreased wound size/increased tissue granulation at next dressing change  Outcome: Progressing  Flowsheets (Taken 1/10/2025 1146)  Decreased wound size/increased tissue granulation at next dressing change:   Promote sleep for wound healing   Protective dressings over bony prominences  Goal: Participates in plan/prevention/treatment measures  Outcome: Progressing  Flowsheets (Taken  1/10/2025 1146)  Participates in plan/prevention/treatment measures:   Discuss with provider PT/OT consult   Elevate heels   Increase activity/out of bed for meals  Goal: Prevent/manage excess moisture  Outcome: Progressing  Flowsheets (Taken 1/10/2025 1146)  Prevent/manage excess moisture:   Cleanse incontinence/protect with barrier cream   Moisturize dry skin  Goal: Prevent/minimize sheer/friction injuries  Outcome: Progressing  Flowsheets (Taken 1/10/2025 1146)  Prevent/minimize sheer/friction injuries:   HOB 30 degrees or less   Increase activity/out of bed for meals   Turn/reposition every 2 hours/use positioning/transfer devices   Use pull sheet  Goal: Promote/optimize nutrition  Outcome: Progressing  Flowsheets (Taken 1/10/2025 1146)  Promote/optimize nutrition:   Assist with feeding   Consume > 50% meals/supplements   Monitor/record intake including meals  Goal: Promote skin healing  Outcome: Progressing  Flowsheets (Taken 1/9/2025 1237)  Promote skin healing:   Assess skin/pad under line(s)/device(s)   Ensure correct size (line/device) and apply per  instructions   Protective dressings over bony prominences   Rotate device position/do not position patient on device   Turn/reposition every 2 hours/use positioning/transfer devices     Problem: Pain  Goal: Takes deep breaths with improved pain control throughout the shift  Outcome: Progressing  Goal: Turns in bed with improved pain control throughout the shift  Outcome: Progressing  Goal: Walks with improved pain control throughout the shift  Outcome: Progressing  Goal: Performs ADL's with improved pain control throughout shift  Outcome: Progressing  Goal: Participates in PT with improved pain control throughout the shift  Outcome: Progressing  Goal: Free from opioid side effects throughout the shift  Outcome: Progressing  Goal: Free from acute confusion related to pain meds throughout the shift  Outcome: Progressing     Problem: Pain -  Adult  Goal: Verbalizes/displays adequate comfort level or baseline comfort level  Outcome: Progressing     Problem: Safety - Adult  Goal: Free from fall injury  Outcome: Progressing     Problem: Discharge Planning  Goal: Discharge to home or other facility with appropriate resources  Outcome: Progressing     Problem: Chronic Conditions and Co-morbidities  Goal: Patient's chronic conditions and co-morbidity symptoms are monitored and maintained or improved  Outcome: Progressing   The patient's goals for the shift include      The clinical goals for the shift include Patient will be hemoynamically stable

## 2025-01-10 NOTE — PROGRESS NOTES
Simi Sandoval is a 89 y.o. female on day 2 of admission presenting with DLBCL (diffuse large B cell lymphoma).    Subjective   Afebrile, VSS. Pt pleasant, son at bedside. Discussed starting epcoritamab today. Discussed with son cancer staging, expected outcomes. All questions answered. Pt endorsing some back pain, improved stool output, stating more gas than stool.        Objective     Physical Exam  Constitutional:       Appearance: She is ill-appearing.   HENT:      Head: Normocephalic.      Nose: Nose normal.      Mouth/Throat:      Mouth: Mucous membranes are dry.      Pharynx: No oropharyngeal exudate or posterior oropharyngeal erythema.   Eyes:      General: No scleral icterus.     Extraocular Movements: Extraocular movements intact.      Pupils: Pupils are equal, round, and reactive to light.   Cardiovascular:      Rate and Rhythm: Normal rate and regular rhythm.      Pulses: Normal pulses.      Heart sounds: Normal heart sounds. No murmur heard.     No friction rub. No gallop.   Pulmonary:      Effort: Pulmonary effort is normal.      Breath sounds: Decreased air movement present.   Abdominal:      General: Bowel sounds are increased. There is no distension.      Palpations: Abdomen is soft.      Tenderness: There is abdominal tenderness in the right upper quadrant and right lower quadrant.   Musculoskeletal:         General: Tenderness (LLE) present.      Cervical back: Normal range of motion and neck supple.      Left lower leg: Edema present.   Skin:     General: Skin is warm and dry.   Neurological:      General: No focal deficit present.      Mental Status: She is alert and oriented to person, place, and time. Mental status is at baseline.   Psychiatric:         Mood and Affect: Mood normal.         Behavior: Behavior normal.         Thought Content: Thought content normal.         Judgment: Judgment normal.         Last Recorded Vitals  Blood pressure 149/65, pulse 74, temperature 36.5 °C (97.7  "°F), temperature source Temporal, resp. rate 18, height 1.589 m (5' 2.56\"), weight 63.4 kg (139 lb 12.4 oz), SpO2 97%.  Intake/Output last 3 Shifts:  I/O last 3 completed shifts:  In: 1050 (16.4 mL/kg) [I.V.:935 (14.6 mL/kg); IV Piggyback:115]  Out: 1400 (21.9 mL/kg) [Urine:1400 (0.6 mL/kg/hr)]  Weight: 64 kg     Relevant Results                 Assessment & Plan  DLBCL (diffuse large B cell lymphoma)    Simi Sandoval is an 89-year-old F with PMH DLBCL of left breast, A-fib (on Eliquis), pulmonary hypertension, Hysterectomy, Thyroid disease,  CHF, NSTEMI, s/p uretral stent (12/13) who comes in for evaluation of left flank pain, left  sided abdominal pain  and diarrhea at OSH on 12/27. Colonoscopy completed 12/30 and was found to have Cdiff colitis  with pancolitis. Pt was started on Vanco for Pancolitis and was treated with zosyn for aspiration pneumonia seen on CT A/P S/P colonoscopy. CT imaging found multiple pulmonary nodules bilaterally, largest dependently on the left measuring 2.6 cm. Biopsy of left lung nodule done 12/11/2024 showed diffuse aggressive large B-cell lymphoma, recurrent by history. Heme/Onc saw the pt at OSH and recommended to transfer to [facility] for further work up, evaluation and treatment of disease recurrence.    Active Issues 01/10/25  #DLBCL; planning for epcoritamab today, start allopurinol, TLS/CRS labs  #Cdiff colitis; continues on oral vanco until 1/13  #hypercalcemia of malignancy; s/p zometax1, will do calcitonin x2 doses today and reassess levels tomorrow    ONC   #Diffuse large B-cell lymphoma   - Last treated with Tafasitamab 5/16/2023   - New lung nodules favoring recurrence of lymphoma back in December.    - last chemo in 2023   - Pathology from lung nodule biopsy 12/11/2024 confirmed disease recurrence .  - CT Flank 12/27/2024 shows disease progression in pulmonary nodules and new cortical lytic lesions in L4-5.   - S/p Dexamethasone 4 mg BID at OSH by Heme/Onc and ordered " MRI per Oncology at OSH on admission to rule out cord compression.  - MRI 1/9: negative for cord compression, soft tissue masses involving the paraspinal musculature, left greater than right with involvement of the left lateral epidural space at the L5 level resulting in moderate spinal canal stenosis  - Start epcoritamab today, daily CRS labs, BID TLS labs     HEME  #Leukocytosis  -WBC 25.4 on admit  -most likely secondary to colitis  #Anemia  -Hgb 10 on admit  -Transfuse if Hgb<7  -Monitor CBC daily    ID  #Cdiff colitis  -Continue PO vanco until 1/13/25  #Prophy  - ACV, fluconazole    PULM  #Acute hypoxic Resp failure    #Aspiration Pneumonia  - Patient likely with aspiration pneumonia status post colonoscopy on 12/30/2024.  She was supine laying on her left side which is where the infiltrate was shown on chest x-ray.  Concern for aspiration during procedure  - S/p 7 day course of Zosyn at OSH  - CXR on admission shows with mild-to-moderate degree of pulmonary interstitial edema. Small left  and right pleural effusion  - 40mg IV lasix (1/9) for pulm edema  - S/p bipap for acute hypoxic respiratory failure at OSH  - S/p intubation in 11/2024 for pneumonia and CHF exacerbation  - Doesn't wear O2 at baseline, wean as tolerated     FEN/GI  Admit wt: 64 kg, current wt: 63.4 kg  F: PRN  E: PRN  N: Low path  #GURINDER (resolved)   - sCr 1.01 on admit  - Monitor CMP daily  # C. diff colitis with pan colitis  (Resolving)  - CT A/P shows mild pancolitis on distal rectosigmoid and pseudomembranes colitis on Colonoscopy (12/30)   - S/p PO Vanco at OSH and continue po vancomycin to 125mg qid on admit with last doses 1/13/25.   # Hypercalcemia  - sCa 12.6 on admit, 1/10: 12.4  - Increase mIVF to 100ml/hr, stopped fluids 1/10 with consideration of hx of CHF  - Zometa 4mg x1 (1/9)  - Calcitonin BID x2 doses (1/10)  #Hyponatremia  - S/p IVF NS on admit x48hrs  - Monitor daily     URO  # Left hydronephrosis, Urinary Retention s/p  Left ureteral stent placed on 12/13/24   - Seen by Urology at OSH  - S/p maki removed on 1/5   - Voiding fine on admit     CARDS   - LVEF 57%. Vegetation noted on prior echo.  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 She denies any increase or change in her neuropathy symptoms. Per Neurology at OSH  - Continue supportive measures    - No further neurological testing needed at this point      MSK   # Lumbar Spinal stenosis    - Started on Oxy 5 mg PRN and cont with Robaxin 500 mg TID PRN     PSYCH   # Anxiety   - C/w Buspar 7.5 mg TID and Cymbalta     ENDO   # Hyperthyroidism   - C/w methimazole      Dispo  - Full code  - R Med port  - Primary Oncologist Dr Mac    Patient seen, examined, and discussed with Dr. Stella Mac.         Delmer Hendrickson, APRN-CNP

## 2025-01-10 NOTE — CARE PLAN
The patient's goals for the shift include    Problem: Respiratory  Goal: Clear secretions with interventions this shift  Outcome: Progressing  Goal: Minimize anxiety/maximize coping throughout shift  Outcome: Progressing  Goal: Minimal/no exertional discomfort or dyspnea this shift  Outcome: Progressing  Goal: No signs of respiratory distress (eg. Use of accessory muscles. Peds grunting)  Outcome: Progressing  Goal: Patent airway maintained this shift  Outcome: Progressing  Goal: Tolerate mechanical ventilation evidenced by VS/agitation level this shift  Outcome: Progressing  Goal: Tolerate pulmonary toileting this shift  Outcome: Progressing  Goal: Verbalize decreased shortness of breath this shift  Outcome: Progressing  Goal: Wean oxygen to maintain O2 saturation per order/standard this shift  Outcome: Progressing  Goal: Increase self care and/or family involvement in next 24 hours  Outcome: Progressing     Problem: Fall/Injury  Goal: Not fall by end of shift  Outcome: Progressing  Goal: Be free from injury by end of the shift  Outcome: Progressing  Goal: Verbalize understanding of personal risk factors for fall in the hospital  Outcome: Progressing  Goal: Verbalize understanding of risk factor reduction measures to prevent injury from fall in the home  Outcome: Progressing  Goal: Use assistive devices by end of the shift  Outcome: Progressing  Goal: Pace activities to prevent fatigue by end of the shift  Outcome: Progressing     Problem: Infection related to problem list condition  Goal: Infection will resolve through treatment  Outcome: Progressing     Problem: Skin  Goal: Decreased wound size/increased tissue granulation at next dressing change  Outcome: Progressing  Goal: Participates in plan/prevention/treatment measures  Outcome: Progressing  Goal: Prevent/manage excess moisture  Outcome: Progressing  Flowsheets (Taken 1/10/2025 0100)  Prevent/manage excess moisture:   Cleanse incontinence/protect with  barrier cream   Moisturize dry skin  Goal: Prevent/minimize sheer/friction injuries  Outcome: Progressing  Goal: Promote/optimize nutrition  Outcome: Progressing  Goal: Promote skin healing  Outcome: Progressing       The clinical goals for the shift include pt will remain hds throughout shift.

## 2025-01-11 ENCOUNTER — APPOINTMENT (OUTPATIENT)
Dept: RADIOLOGY | Facility: HOSPITAL | Age: 89
DRG: 840 | End: 2025-01-11
Payer: MEDICARE

## 2025-01-11 LAB
ABO GROUP (TYPE) IN BLOOD: NORMAL
ALBUMIN SERPL BCP-MCNC: 2.6 G/DL (ref 3.4–5)
ALBUMIN SERPL BCP-MCNC: 2.7 G/DL (ref 3.4–5)
ALP SERPL-CCNC: 67 U/L (ref 33–136)
ALT SERPL W P-5'-P-CCNC: 19 U/L (ref 7–45)
ANION GAP SERPL CALC-SCNC: 12 MMOL/L (ref 10–20)
ANION GAP SERPL CALC-SCNC: 15 MMOL/L (ref 10–20)
ANTIBODY SCREEN: NORMAL
AST SERPL W P-5'-P-CCNC: 23 U/L (ref 9–39)
ATRIAL RATE: 76 BPM
BASOPHILS # BLD MANUAL: 0 X10*3/UL (ref 0–0.1)
BASOPHILS NFR BLD MANUAL: 0 %
BILIRUB SERPL-MCNC: 0.3 MG/DL (ref 0–1.2)
BUN SERPL-MCNC: 24 MG/DL (ref 6–23)
BUN SERPL-MCNC: 27 MG/DL (ref 6–23)
BURR CELLS BLD QL SMEAR: ABNORMAL
CALCIUM SERPL-MCNC: 10.8 MG/DL (ref 8.6–10.6)
CALCIUM SERPL-MCNC: 11 MG/DL (ref 8.6–10.6)
CHLORIDE SERPL-SCNC: 94 MMOL/L (ref 98–107)
CHLORIDE SERPL-SCNC: 96 MMOL/L (ref 98–107)
CO2 SERPL-SCNC: 26 MMOL/L (ref 21–32)
CO2 SERPL-SCNC: 28 MMOL/L (ref 21–32)
CREAT SERPL-MCNC: 1.1 MG/DL (ref 0.5–1.05)
CREAT SERPL-MCNC: 1.1 MG/DL (ref 0.5–1.05)
EGFRCR SERPLBLD CKD-EPI 2021: 49 ML/MIN/1.73M*2
EGFRCR SERPLBLD CKD-EPI 2021: 49 ML/MIN/1.73M*2
EOSINOPHIL # BLD MANUAL: 0 X10*3/UL (ref 0–0.4)
EOSINOPHIL NFR BLD MANUAL: 0 %
ERYTHROCYTE [DISTWIDTH] IN BLOOD BY AUTOMATED COUNT: 15.1 % (ref 11.5–14.5)
FIBRINOGEN PPP-MCNC: 333 MG/DL (ref 200–400)
GLUCOSE SERPL-MCNC: 134 MG/DL (ref 74–99)
GLUCOSE SERPL-MCNC: 192 MG/DL (ref 74–99)
HCT VFR BLD AUTO: 20.7 % (ref 36–46)
HGB BLD-MCNC: 8.7 G/DL (ref 12–16)
IMM GRANULOCYTES # BLD AUTO: 0.18 X10*3/UL (ref 0–0.5)
IMM GRANULOCYTES NFR BLD AUTO: 0.9 % (ref 0–0.9)
LYMPHOCYTES # BLD MANUAL: 0.24 X10*3/UL (ref 0.8–3)
LYMPHOCYTES NFR BLD MANUAL: 1.2 %
MAGNESIUM SERPL-MCNC: 1.67 MG/DL (ref 1.6–2.4)
MAGNESIUM SERPL-MCNC: 1.97 MG/DL (ref 1.6–2.4)
MCH RBC QN AUTO: 40.1 PG (ref 26–34)
MCHC RBC AUTO-ENTMCNC: 42 G/DL (ref 32–36)
MCV RBC AUTO: 95 FL (ref 80–100)
MONOCYTES # BLD MANUAL: 0.24 X10*3/UL (ref 0.05–0.8)
MONOCYTES NFR BLD MANUAL: 1.2 %
NEUTS SEG # BLD MANUAL: 19.72 X10*3/UL (ref 1.6–5)
NEUTS SEG NFR BLD MANUAL: 97.6 %
NRBC BLD-RTO: 0 /100 WBCS (ref 0–0)
P AXIS: 51 DEGREES
P OFFSET: 189 MS
P ONSET: 130 MS
PHOSPHATE SERPL-MCNC: 2.4 MG/DL (ref 2.5–4.9)
PHOSPHATE SERPL-MCNC: 2.9 MG/DL (ref 2.5–4.9)
PLATELET # BLD AUTO: 406 X10*3/UL (ref 150–450)
POLYCHROMASIA BLD QL SMEAR: ABNORMAL
POTASSIUM SERPL-SCNC: 4.3 MMOL/L (ref 3.5–5.3)
POTASSIUM SERPL-SCNC: 4.5 MMOL/L (ref 3.5–5.3)
PR INTERVAL: 172 MS
PROT SERPL-MCNC: 4.6 G/DL (ref 6.4–8.2)
Q ONSET: 216 MS
QRS COUNT: 13 BEATS
QRS DURATION: 104 MS
QT INTERVAL: 328 MS
QTC CALCULATION(BAZETT): 369 MS
QTC FREDERICIA: 354 MS
R AXIS: 8 DEGREES
RBC # BLD AUTO: 2.17 X10*6/UL (ref 4–5.2)
RBC MORPH BLD: ABNORMAL
RH FACTOR (ANTIGEN D): NORMAL
SCHISTOCYTES BLD QL SMEAR: ABNORMAL
SODIUM SERPL-SCNC: 130 MMOL/L (ref 136–145)
SODIUM SERPL-SCNC: 132 MMOL/L (ref 136–145)
T AXIS: -8 DEGREES
T OFFSET: 380 MS
TOTAL CELLS COUNTED BLD: 83
URATE SERPL-MCNC: 6.9 MG/DL (ref 2.3–6.7)
VENTRICULAR RATE: 76 BPM
WBC # BLD AUTO: 20.2 X10*3/UL (ref 4.4–11.3)

## 2025-01-11 PROCEDURE — 2500000001 HC RX 250 WO HCPCS SELF ADMINISTERED DRUGS (ALT 637 FOR MEDICARE OP): Performed by: NURSE PRACTITIONER

## 2025-01-11 PROCEDURE — 2500000004 HC RX 250 GENERAL PHARMACY W/ HCPCS (ALT 636 FOR OP/ED): Mod: TB

## 2025-01-11 PROCEDURE — 86901 BLOOD TYPING SEROLOGIC RH(D): CPT

## 2025-01-11 PROCEDURE — 83735 ASSAY OF MAGNESIUM: CPT

## 2025-01-11 PROCEDURE — 85007 BL SMEAR W/DIFF WBC COUNT: CPT

## 2025-01-11 PROCEDURE — 71045 X-RAY EXAM CHEST 1 VIEW: CPT

## 2025-01-11 PROCEDURE — 85027 COMPLETE CBC AUTOMATED: CPT

## 2025-01-11 PROCEDURE — 1170000001 HC PRIVATE ONCOLOGY ROOM DAILY

## 2025-01-11 PROCEDURE — 2500000005 HC RX 250 GENERAL PHARMACY W/O HCPCS

## 2025-01-11 PROCEDURE — 85384 FIBRINOGEN ACTIVITY: CPT

## 2025-01-11 PROCEDURE — 2500000001 HC RX 250 WO HCPCS SELF ADMINISTERED DRUGS (ALT 637 FOR MEDICARE OP)

## 2025-01-11 PROCEDURE — 2500000004 HC RX 250 GENERAL PHARMACY W/ HCPCS (ALT 636 FOR OP/ED)

## 2025-01-11 PROCEDURE — 80069 RENAL FUNCTION PANEL: CPT | Mod: CCI

## 2025-01-11 PROCEDURE — 2500000002 HC RX 250 W HCPCS SELF ADMINISTERED DRUGS (ALT 637 FOR MEDICARE OP, ALT 636 FOR OP/ED)

## 2025-01-11 PROCEDURE — 99233 SBSQ HOSP IP/OBS HIGH 50: CPT | Performed by: INTERNAL MEDICINE

## 2025-01-11 PROCEDURE — 84100 ASSAY OF PHOSPHORUS: CPT

## 2025-01-11 PROCEDURE — 2500000004 HC RX 250 GENERAL PHARMACY W/ HCPCS (ALT 636 FOR OP/ED): Mod: TB | Performed by: NURSE PRACTITIONER

## 2025-01-11 PROCEDURE — 80053 COMPREHEN METABOLIC PANEL: CPT

## 2025-01-11 PROCEDURE — 2500000004 HC RX 250 GENERAL PHARMACY W/ HCPCS (ALT 636 FOR OP/ED): Performed by: INTERNAL MEDICINE

## 2025-01-11 PROCEDURE — 71045 X-RAY EXAM CHEST 1 VIEW: CPT | Performed by: RADIOLOGY

## 2025-01-11 RX ORDER — OXYCODONE HYDROCHLORIDE 5 MG/1
10 TABLET ORAL EVERY 6 HOURS PRN
Status: DISPENSED | OUTPATIENT
Start: 2025-01-11

## 2025-01-11 RX ORDER — DEXAMETHASONE 4 MG/1
16 TABLET ORAL DAILY
Status: DISPENSED | OUTPATIENT
Start: 2025-01-11 | End: 2025-01-14

## 2025-01-11 RX ORDER — CALCITONIN SALMON 200 [USP'U]/ML
4 INJECTION, SOLUTION INTRAMUSCULAR; SUBCUTANEOUS EVERY 12 HOURS
Status: COMPLETED | OUTPATIENT
Start: 2025-01-11 | End: 2025-01-12

## 2025-01-11 RX ADMIN — METOPROLOL SUCCINATE 100 MG: 50 TABLET, EXTENDED RELEASE ORAL at 09:14

## 2025-01-11 RX ADMIN — ACYCLOVIR 400 MG: 400 TABLET ORAL at 20:49

## 2025-01-11 RX ADMIN — CALCITONIN SALMON 250 UNITS: 200 INJECTION, SOLUTION INTRAMUSCULAR; SUBCUTANEOUS at 05:57

## 2025-01-11 RX ADMIN — BUSPIRONE HYDROCHLORIDE 7.5 MG: 15 TABLET ORAL at 09:13

## 2025-01-11 RX ADMIN — SODIUM PHOSPHATE, MONOBASIC, MONOHYDRATE AND SODIUM PHOSPHATE, DIBASIC, ANHYDROUS 15 MMOL: 142; 276 INJECTION, SOLUTION INTRAVENOUS at 22:17

## 2025-01-11 RX ADMIN — OXYCODONE HYDROCHLORIDE 10 MG: 5 TABLET ORAL at 16:02

## 2025-01-11 RX ADMIN — AMLODIPINE BESYLATE 5 MG: 5 TABLET ORAL at 09:14

## 2025-01-11 RX ADMIN — VANCOMYCIN HYDROCHLORIDE 125 MG: 125 CAPSULE ORAL at 12:00

## 2025-01-11 RX ADMIN — LIDOCAINE 1 PATCH: 4 PATCH TOPICAL at 20:52

## 2025-01-11 RX ADMIN — VANCOMYCIN HYDROCHLORIDE 125 MG: 125 CAPSULE ORAL at 20:49

## 2025-01-11 RX ADMIN — PANTOPRAZOLE SODIUM 40 MG: 40 TABLET, DELAYED RELEASE ORAL at 06:00

## 2025-01-11 RX ADMIN — FLUCONAZOLE 400 MG: 200 TABLET ORAL at 09:14

## 2025-01-11 RX ADMIN — AMIODARONE HYDROCHLORIDE 200 MG: 200 TABLET ORAL at 09:13

## 2025-01-11 RX ADMIN — CALCITONIN SALMON 250 UNITS: 200 INJECTION, SOLUTION INTRAMUSCULAR; SUBCUTANEOUS at 17:39

## 2025-01-11 RX ADMIN — APIXABAN 5 MG: 5 TABLET, FILM COATED ORAL at 20:49

## 2025-01-11 RX ADMIN — ALLOPURINOL 300 MG: 300 TABLET ORAL at 09:14

## 2025-01-11 RX ADMIN — APIXABAN 5 MG: 5 TABLET, FILM COATED ORAL at 09:14

## 2025-01-11 RX ADMIN — DULOXETINE HYDROCHLORIDE 20 MG: 20 CAPSULE, DELAYED RELEASE ORAL at 09:14

## 2025-01-11 RX ADMIN — BUSPIRONE HYDROCHLORIDE 7.5 MG: 15 TABLET ORAL at 16:03

## 2025-01-11 RX ADMIN — VANCOMYCIN HYDROCHLORIDE 125 MG: 125 CAPSULE ORAL at 06:00

## 2025-01-11 RX ADMIN — METHIMAZOLE 2.5 MG: 5 TABLET ORAL at 09:13

## 2025-01-11 RX ADMIN — MAGNESIUM OXIDE TAB 400 MG (241.3 MG ELEMENTAL MG) 400 MG: 400 (241.3 MG) TAB at 06:00

## 2025-01-11 RX ADMIN — DEXAMETHASONE 16 MG: 4 TABLET ORAL at 11:54

## 2025-01-11 RX ADMIN — BUSPIRONE HYDROCHLORIDE 7.5 MG: 15 TABLET ORAL at 20:50

## 2025-01-11 RX ADMIN — ACYCLOVIR 400 MG: 400 TABLET ORAL at 09:14

## 2025-01-11 RX ADMIN — ROPINIROLE 0.5 MG: 0.5 TABLET, FILM COATED ORAL at 20:51

## 2025-01-11 RX ADMIN — MAGNESIUM OXIDE TAB 400 MG (241.3 MG ELEMENTAL MG) 400 MG: 400 (241.3 MG) TAB at 17:39

## 2025-01-11 RX ADMIN — VANCOMYCIN HYDROCHLORIDE 125 MG: 125 CAPSULE ORAL at 16:03

## 2025-01-11 ASSESSMENT — COGNITIVE AND FUNCTIONAL STATUS - GENERAL
MOVING TO AND FROM BED TO CHAIR: A LITTLE
HELP NEEDED FOR BATHING: A LOT
PERSONAL GROOMING: A LITTLE
DAILY ACTIVITIY SCORE: 14
EATING MEALS: A LITTLE
STANDING UP FROM CHAIR USING ARMS: A LITTLE
MOVING FROM LYING ON BACK TO SITTING ON SIDE OF FLAT BED WITH BEDRAILS: A LITTLE
DRESSING REGULAR LOWER BODY CLOTHING: A LOT
WALKING IN HOSPITAL ROOM: A LITTLE
DRESSING REGULAR UPPER BODY CLOTHING: A LITTLE
EATING MEALS: A LITTLE
HELP NEEDED FOR BATHING: A LOT
MOVING TO AND FROM BED TO CHAIR: A LITTLE
STANDING UP FROM CHAIR USING ARMS: A LITTLE
TOILETING: A LITTLE
CLIMB 3 TO 5 STEPS WITH RAILING: A LITTLE
HELP NEEDED FOR BATHING: A LOT
MOBILITY SCORE: 18
DRESSING REGULAR UPPER BODY CLOTHING: A LOT
DAILY ACTIVITIY SCORE: 14
PERSONAL GROOMING: A LITTLE
DRESSING REGULAR LOWER BODY CLOTHING: A LOT
PERSONAL GROOMING: A LITTLE
DRESSING REGULAR LOWER BODY CLOTHING: A LOT
TOILETING: A LOT
TOILETING: A LOT
TURNING FROM BACK TO SIDE WHILE IN FLAT BAD: A LITTLE
TURNING FROM BACK TO SIDE WHILE IN FLAT BAD: A LITTLE
EATING MEALS: A LITTLE
DRESSING REGULAR UPPER BODY CLOTHING: A LOT
DAILY ACTIVITIY SCORE: 16
WALKING IN HOSPITAL ROOM: A LITTLE
MOVING FROM LYING ON BACK TO SITTING ON SIDE OF FLAT BED WITH BEDRAILS: A LITTLE

## 2025-01-11 ASSESSMENT — PAIN SCALES - GENERAL
PAINLEVEL_OUTOF10: 10 - WORST POSSIBLE PAIN
PAINLEVEL_OUTOF10: 0 - NO PAIN
PAINLEVEL_OUTOF10: 2

## 2025-01-11 ASSESSMENT — PAIN - FUNCTIONAL ASSESSMENT
PAIN_FUNCTIONAL_ASSESSMENT: 0-10
PAIN_FUNCTIONAL_ASSESSMENT: UNABLE TO SELF-REPORT

## 2025-01-11 NOTE — CARE PLAN
The patient's goals for the shift include    Problem: Respiratory  Goal: Clear secretions with interventions this shift  Outcome: Progressing  Goal: Minimize anxiety/maximize coping throughout shift  Outcome: Progressing  Goal: Minimal/no exertional discomfort or dyspnea this shift  Outcome: Progressing  Goal: No signs of respiratory distress (eg. Use of accessory muscles. Peds grunting)  Outcome: Progressing  Goal: Patent airway maintained this shift  Outcome: Progressing  Goal: Tolerate mechanical ventilation evidenced by VS/agitation level this shift  Outcome: Progressing  Goal: Tolerate pulmonary toileting this shift  Outcome: Progressing  Goal: Verbalize decreased shortness of breath this shift  Outcome: Progressing  Goal: Wean oxygen to maintain O2 saturation per order/standard this shift  Outcome: Progressing  Goal: Increase self care and/or family involvement in next 24 hours  Outcome: Progressing     Problem: Fall/Injury  Goal: Not fall by end of shift  Outcome: Progressing  Goal: Be free from injury by end of the shift  Outcome: Progressing  Goal: Verbalize understanding of personal risk factors for fall in the hospital  Outcome: Progressing  Goal: Verbalize understanding of risk factor reduction measures to prevent injury from fall in the home  Outcome: Progressing  Goal: Use assistive devices by end of the shift  Outcome: Progressing  Goal: Pace activities to prevent fatigue by end of the shift  Outcome: Progressing     Problem: Infection related to problem list condition  Goal: Infection will resolve through treatment  Outcome: Progressing     Problem: Skin  Goal: Decreased wound size/increased tissue granulation at next dressing change  Outcome: Progressing  Goal: Participates in plan/prevention/treatment measures  Outcome: Progressing  Goal: Prevent/manage excess moisture  Outcome: Progressing  Goal: Prevent/minimize sheer/friction injuries  Outcome: Progressing  Goal: Promote/optimize  nutrition  Outcome: Progressing  Goal: Promote skin healing  Outcome: Progressing     Problem: Pain  Goal: Takes deep breaths with improved pain control throughout the shift  Outcome: Progressing  Goal: Turns in bed with improved pain control throughout the shift  Outcome: Progressing  Goal: Walks with improved pain control throughout the shift  Outcome: Progressing  Goal: Performs ADL's with improved pain control throughout shift  Outcome: Progressing  Goal: Participates in PT with improved pain control throughout the shift  Outcome: Progressing  Goal: Free from opioid side effects throughout the shift  Outcome: Progressing  Goal: Free from acute confusion related to pain meds throughout the shift  Outcome: Progressing     Problem: Pain - Adult  Goal: Verbalizes/displays adequate comfort level or baseline comfort level  Outcome: Progressing     Problem: Safety - Adult  Goal: Free from fall injury  Outcome: Progressing     Problem: Discharge Planning  Goal: Discharge to home or other facility with appropriate resources  Outcome: Progressing     Problem: Chronic Conditions and Co-morbidities  Goal: Patient's chronic conditions and co-morbidity symptoms are monitored and maintained or improved  Outcome: Progressing       The clinical goals for the shift include pt will remainhg hds and vss throughout shift.

## 2025-01-11 NOTE — CARE PLAN
The patient's goals for the shift include   rest and comfort    The clinical goals for the shift include pt will remainhg hds and vss throughout shift.

## 2025-01-11 NOTE — PROGRESS NOTES
Simi Sandoval is a 89 y.o. female on day 3 of admission presenting with DLBCL (diffuse large B cell lymphoma).    Subjective   Afebrile, VSS. On supplemental oxygen. Pt is more fretful and anxious today.  Having ongoing pain in her back. No focal weakness. Neurologically grossly intact. No speech or language changes. No seizures. No HA. No NV. No e/o active bleeding or infection. No CP, SOB, cough.     ROS as per HPI         Objective     Physical Exam  Constitutional:       Appearance: She is ill-appearing.   HENT:      Head: Normocephalic.      Nose: Nose normal.      Mouth/Throat:      Mouth: Mucous membranes are dry.      Pharynx: No oropharyngeal exudate or posterior oropharyngeal erythema.   Eyes:      General: No scleral icterus.     Extraocular Movements: Extraocular movements intact.      Pupils: Pupils are equal, round, and reactive to light.   Cardiovascular:      Rate and Rhythm: Normal rate and regular rhythm.      Pulses: Normal pulses.      Heart sounds: Normal heart sounds. No murmur heard.     No friction rub. No gallop.   Pulmonary:      Effort: Pulmonary effort is normal.      Breath sounds: Decreased air movement present.   Abdominal:      General: Bowel sounds are increased. There is no distension.      Palpations: Abdomen is soft.      Tenderness: There is abdominal tenderness in the right upper quadrant and right lower quadrant.   Musculoskeletal:         General: Tenderness (LLE) present.      Cervical back: Normal range of motion and neck supple.      Left lower leg: Edema present.   Skin:     General: Skin is warm and dry.   Neurological:      General: No focal deficit present.      Mental Status: She is alert and oriented to person, place, and time. Mental status is at baseline.   Psychiatric:         Mood and Affect: Mood normal.         Behavior: Behavior normal.         Thought Content: Thought content normal.         Judgment: Judgment normal.         Last Recorded  Vitals  Vitals:    01/11/25 1146   BP: 144/69   Pulse: 72   Resp: 18   Temp: 36 °C (96.8 °F)   SpO2: 98%     Results for orders placed or performed during the hospital encounter of 01/08/25 (from the past 24 hours)   Renal function panel   Result Value Ref Range    Glucose 121 (H) 74 - 99 mg/dL    Sodium 134 (L) 136 - 145 mmol/L    Potassium 4.3 3.5 - 5.3 mmol/L    Chloride 98 98 - 107 mmol/L    Bicarbonate 28 21 - 32 mmol/L    Anion Gap 12 10 - 20 mmol/L    Urea Nitrogen 25 (H) 6 - 23 mg/dL    Creatinine 1.09 (H) 0.50 - 1.05 mg/dL    eGFR 49 (L) >60 mL/min/1.73m*2    Calcium 11.7 (H) 8.6 - 10.6 mg/dL    Phosphorus 3.0 2.5 - 4.9 mg/dL    Albumin 2.7 (L) 3.4 - 5.0 g/dL   Magnesium   Result Value Ref Range    Magnesium 1.57 (L) 1.60 - 2.40 mg/dL   Uric Acid   Result Value Ref Range    Uric Acid 6.9 (H) 2.3 - 6.7 mg/dL   CBC and Auto Differential   Result Value Ref Range    WBC 20.2 (H) 4.4 - 11.3 x10*3/uL    nRBC 0.0 0.0 - 0.0 /100 WBCs    RBC 2.17 (L) 4.00 - 5.20 x10*6/uL    Hemoglobin 8.7 (L) 12.0 - 16.0 g/dL    Hematocrit 20.7 (L) 36.0 - 46.0 %    MCV 95 80 - 100 fL    MCH 40.1 (H) 26.0 - 34.0 pg    MCHC 42.0 (H) 32.0 - 36.0 g/dL    RDW 15.1 (H) 11.5 - 14.5 %    Platelets 406 150 - 450 x10*3/uL    Immature Granulocytes %, Automated 0.9 0.0 - 0.9 %    Immature Granulocytes Absolute, Automated 0.18 0.00 - 0.50 x10*3/uL   Comprehensive Metabolic Panel   Result Value Ref Range    Glucose 134 (H) 74 - 99 mg/dL    Sodium 132 (L) 136 - 145 mmol/L    Potassium 4.3 3.5 - 5.3 mmol/L    Chloride 96 (L) 98 - 107 mmol/L    Bicarbonate 28 21 - 32 mmol/L    Anion Gap 12 10 - 20 mmol/L    Urea Nitrogen 24 (H) 6 - 23 mg/dL    Creatinine 1.10 (H) 0.50 - 1.05 mg/dL    eGFR 49 (L) >60 mL/min/1.73m*2    Calcium 11.0 (H) 8.6 - 10.6 mg/dL    Albumin 2.6 (L) 3.4 - 5.0 g/dL    Alkaline Phosphatase 67 33 - 136 U/L    Total Protein 4.6 (L) 6.4 - 8.2 g/dL    AST 23 9 - 39 U/L    Bilirubin, Total 0.3 0.0 - 1.2 mg/dL    ALT 19 7 - 45 U/L    Magnesium   Result Value Ref Range    Magnesium 1.97 1.60 - 2.40 mg/dL   Phosphorus   Result Value Ref Range    Phosphorus 2.9 2.5 - 4.9 mg/dL   Fibrinogen   Result Value Ref Range    Fibrinogen 333 200 - 400 mg/dL   Manual Differential   Result Value Ref Range    Neutrophils %, Manual 97.6 40.0 - 80.0 %    Lymphocytes %, Manual 1.2 13.0 - 44.0 %    Monocytes %, Manual 1.2 2.0 - 10.0 %    Eosinophils %, Manual 0.0 0.0 - 6.0 %    Basophils %, Manual 0.0 0.0 - 2.0 %    Seg Neutrophils Absolute, Manual 19.72 (H) 1.60 - 5.00 x10*3/uL    Lymphocytes Absolute, Manual 0.24 (L) 0.80 - 3.00 x10*3/uL    Monocytes Absolute, Manual 0.24 0.05 - 0.80 x10*3/uL    Eosinophils Absolute, Manual 0.00 0.00 - 0.40 x10*3/uL    Basophils Absolute, Manual 0.00 0.00 - 0.10 x10*3/uL    Total Cells Counted 83     RBC Morphology See Below     Polychromasia Mild     RBC Fragments Few     Sharps Cells Few      *Note: Due to a large number of results and/or encounters for the requested time period, some results have not been displayed. A complete set of results can be found in Results Review.      Intake/Output last 3 Shifts:  I/O last 3 completed shifts:  In: - (0 mL/kg)   Out: 770 (12.1 mL/kg) [Urine:770 (0.3 mL/kg/hr)]  Weight: 63.4 kg     Scheduled medications  acyclovir, 400 mg, oral, q12h VERONICA  allopurinol, 300 mg, oral, Daily  amiodarone, 200 mg, oral, Daily  amLODIPine, 5 mg, oral, Daily  apixaban, 5 mg, oral, BID  busPIRone, 7.5 mg, oral, TID  calcitonin, 4 Units/kg, intramuscular, q12h  dexAMETHasone, 16 mg, oral, Daily  DULoxetine, 20 mg, oral, Daily  fluconazole, 400 mg, oral, Daily  lidocaine, 1 patch, transdermal, Daily  magnesium oxide, 400 mg, oral, q12h VERONICA  methIMAzole, 2.5 mg, oral, Daily  metoprolol succinate XL, 100 mg, oral, Daily  pantoprazole, 40 mg, oral, Daily before breakfast  rOPINIRole, 0.5 mg, oral, Nightly  vancomycin, 125 mg, oral, 4x daily      Continuous medications     PRN medications  PRN medications: albuterol,  alteplase, dextrose, diphenhydrAMINE, EPINEPHrine HCl, famotidine, ipratropium-albuteroL, melatonin, methocarbamol, methylPREDNISolone sodium succinate (PF), moisturizing mouth, ondansetron, oxyCODONE, prochlorperazine, prochlorperazine, sodium chloride       Assessment & Plan  DLBCL (diffuse large B cell lymphoma)    Simi Sandoval is an 89-year-old F with PMH DLBCL of left breast, A-fib (on Eliquis), pulmonary hypertension, Hysterectomy, Thyroid disease,  CHF, NSTEMI, s/p uretral stent (12/13) who comes in for evaluation of left flank pain, left  sided abdominal pain  and diarrhea at OSH on 12/27. Colonoscopy completed 12/30 and was found to have Cdiff colitis  with pancolitis. Pt was started on Vanco for Pancolitis and was treated with zosyn for aspiration pneumonia seen on CT A/P S/P colonoscopy. CT imaging found multiple pulmonary nodules bilaterally, largest dependently on the left measuring 2.6 cm. Biopsy of left lung nodule done 12/11/2024 showed diffuse aggressive large B-cell lymphoma, recurrent by history. Heme/Onc saw the pt at OSH and recommended to transfer to [facility] for further work up, evaluation and treatment of disease recurrence.    Active Issues 01/11/25  #DLBCL; day 2 of epcoritamab, cont allopurinol, TLS/CRS labs appear grossly stable   #Cdiff colitis; continues on oral vanco until 1/13 -- stools seem to be slowing down   #hypercalcemia of malignancy; s/p zometax1, will do calcitonin x2 doses 1/10: will repeat on 1/11 with slight improvement in Ca   #Cancer-related pain: will modestly increase PRN oxy dose. Per Supportive Onc, will see her on Monday  #Anxiety: Cont current meds. Per Supportive Onc, will see her on Monday  #Repeating CXR with ongoing supplemental oxygen need     ONC   #Diffuse large B-cell lymphoma   - Last treated with Tafasitamab 5/16/2023   - New lung nodules favoring recurrence of lymphoma back in December.    - last chemo in 2023   - Pathology from lung nodule biopsy  12/11/2024 confirmed disease recurrence .  - CT Flank 12/27/2024 shows disease progression in pulmonary nodules and new cortical lytic lesions in L4-5.   - S/p Dexamethasone 4 mg BID at OSH by Heme/Onc and ordered MRI per Oncology at OSH on admission to rule out cord compression.  - MRI 1/9: negative for cord compression, soft tissue masses involving the paraspinal musculature, left greater than right with involvement of the left lateral epidural space at the L5 level resulting in moderate spinal canal stenosis  - Continue C1 epcoritamab (D1: 1/10/25). Today is Day 2. daily CRS labs, BID TLS labs     HEME  #Leukocytosis  -WBC 25.4 on admit  -most likely secondary to colitis  #Anemia  -Hgb 10 on admit  -Transfuse if Hgb<7  -Monitor CBC daily    ID  #Cdiff colitis  -Continue PO vanco until 1/13/25  #Prophy  - ACV, fluconazole    PULM  #Acute hypoxic Resp failure    #Aspiration Pneumonia  - Patient likely with aspiration pneumonia status post colonoscopy on 12/30/2024.  She was supine laying on her left side which is where the infiltrate was shown on chest x-ray.  Concern for aspiration during procedure  - S/p 7 day course of Zosyn at OSH  - CXR on admission shows with mild-to-moderate degree of pulmonary interstitial edema. Small left  and right pleural effusion  - 40mg IV lasix (1/9) for pulm edema  - S/p bipap for acute hypoxic respiratory failure at OSH  - S/p intubation in 11/2024 for pneumonia and CHF exacerbation  - Doesn't wear O2 at baseline, wean as tolerated -- repeat CXR on 1/11 as above      FEN/GI  Admit wt: 64 kg, current wt: 63.4 kg  F: PRN  E: PRN  N: Low path  #GURINDER (resolved)   - sCr 1.01 on admit  - Monitor CMP daily  # C. diff colitis with pan colitis  (Resolving)  - CT A/P shows mild pancolitis on distal rectosigmoid and pseudomembranes colitis on Colonoscopy (12/30)   - S/p PO Vanco at OSH and continue po vancomycin to 125mg qid on admit with last doses 1/13/25.   # Hypercalcemia  - sCa 12.6 on  admit, 1/10: 12.4  - Increase mIVF to 100ml/hr, stopped fluids 1/10 with consideration of hx of CHF  - Zometa 4mg x1 (1/9)  - Calcitonin BID x2 doses (1/10) -- repeat 2 doses on 1/11  #Hyponatremia  - S/p IVF NS on admit x48hrs  - Monitor daily     URO  # Left hydronephrosis, Urinary Retention s/p Left ureteral stent placed on 12/13/24   - Seen by Urology at OSH  - S/p maki removed on 1/5   - Voiding fine on admit     CARDS   - LVEF 57%. Vegetation noted on prior echo.  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 She denies any increase or change in her neuropathy symptoms. Per Neurology at OSH  - Continue supportive measures    - No further neurological testing needed at this point      MSK   # Lumbar Spinal stenosis    - Started on Oxy 5 mg PRN and cont with Robaxin 500 mg TID PRN -- will cautiously increase oxy as of 1/11 and consult Supportive Onc (they will see Monday)      PSYCH   # Anxiety   - C/w Buspar 7.5 mg TID and Cymbalta  -consult Supportive Onc (they will see Monday)      ENDO   # Hyperthyroidism   - C/w methimazole      Dispo  - Full code  - R Med port  - Primary Oncologist Dr Mac    Patient seen, examined, and discussed with Dr. Stella Mac.         Erin Ricks, APRN-CNP

## 2025-01-11 NOTE — CARE PLAN
The patient's goals for the shift include rest and comfort    The clinical goals for the shift include pt will remainhg hds and vss throughout shift.     Nostril Rim Text: The closure involved the nostril rim.

## 2025-01-12 ENCOUNTER — APPOINTMENT (OUTPATIENT)
Dept: RADIOLOGY | Facility: HOSPITAL | Age: 89
DRG: 840 | End: 2025-01-12
Payer: MEDICARE

## 2025-01-12 VITALS
SYSTOLIC BLOOD PRESSURE: 117 MMHG | HEIGHT: 63 IN | OXYGEN SATURATION: 94 % | RESPIRATION RATE: 18 BRPM | TEMPERATURE: 97.7 F | DIASTOLIC BLOOD PRESSURE: 65 MMHG | WEIGHT: 139.77 LBS | HEART RATE: 58 BPM | BODY MASS INDEX: 24.77 KG/M2

## 2025-01-12 LAB
ALBUMIN SERPL BCP-MCNC: 2.6 G/DL (ref 3.4–5)
ALBUMIN SERPL BCP-MCNC: 2.7 G/DL (ref 3.4–5)
ALP SERPL-CCNC: 81 U/L (ref 33–136)
ALT SERPL W P-5'-P-CCNC: 24 U/L (ref 7–45)
ANION GAP SERPL CALC-SCNC: 14 MMOL/L (ref 10–20)
ANION GAP SERPL CALC-SCNC: 15 MMOL/L (ref 10–20)
AST SERPL W P-5'-P-CCNC: 22 U/L (ref 9–39)
BASOPHILS # BLD AUTO: 0 X10*3/UL (ref 0–0.1)
BASOPHILS NFR BLD AUTO: 0 %
BILIRUB DIRECT SERPL-MCNC: 0.1 MG/DL (ref 0–0.3)
BILIRUB SERPL-MCNC: 0.3 MG/DL (ref 0–1.2)
BUN SERPL-MCNC: 30 MG/DL (ref 6–23)
BUN SERPL-MCNC: 33 MG/DL (ref 6–23)
BURR CELLS BLD QL SMEAR: NORMAL
CALCIUM SERPL-MCNC: 10 MG/DL (ref 8.6–10.6)
CALCIUM SERPL-MCNC: 10.5 MG/DL (ref 8.6–10.6)
CHLORIDE SERPL-SCNC: 90 MMOL/L (ref 98–107)
CHLORIDE SERPL-SCNC: 92 MMOL/L (ref 98–107)
CO2 SERPL-SCNC: 26 MMOL/L (ref 21–32)
CO2 SERPL-SCNC: 26 MMOL/L (ref 21–32)
CREAT SERPL-MCNC: 1.06 MG/DL (ref 0.5–1.05)
CREAT SERPL-MCNC: 1.12 MG/DL (ref 0.5–1.05)
CRP SERPL-MCNC: 0.36 MG/DL
EGFRCR SERPLBLD CKD-EPI 2021: 48 ML/MIN/1.73M*2
EGFRCR SERPLBLD CKD-EPI 2021: 51 ML/MIN/1.73M*2
EOSINOPHIL # BLD AUTO: 0 X10*3/UL (ref 0–0.4)
EOSINOPHIL NFR BLD AUTO: 0 %
ERYTHROCYTE [DISTWIDTH] IN BLOOD BY AUTOMATED COUNT: 14.4 % (ref 11.5–14.5)
FERRITIN SERPL-MCNC: 352 NG/ML (ref 8–150)
FIBRINOGEN PPP-MCNC: 302 MG/DL (ref 200–400)
GLUCOSE SERPL-MCNC: 140 MG/DL (ref 74–99)
GLUCOSE SERPL-MCNC: 166 MG/DL (ref 74–99)
HCT VFR BLD AUTO: 24.5 % (ref 36–46)
HGB BLD-MCNC: 9 G/DL (ref 12–16)
IMM GRANULOCYTES # BLD AUTO: 0.08 X10*3/UL (ref 0–0.5)
IMM GRANULOCYTES NFR BLD AUTO: 1.3 % (ref 0–0.9)
LDH SERPL L TO P-CCNC: 300 U/L (ref 84–246)
LYMPHOCYTES # BLD AUTO: 0.03 X10*3/UL (ref 0.8–3)
LYMPHOCYTES NFR BLD AUTO: 0.5 %
MAGNESIUM SERPL-MCNC: 1.6 MG/DL (ref 1.6–2.4)
MAGNESIUM SERPL-MCNC: 1.61 MG/DL (ref 1.6–2.4)
MCH RBC QN AUTO: 36 PG (ref 26–34)
MCHC RBC AUTO-ENTMCNC: 36.7 G/DL (ref 32–36)
MCV RBC AUTO: 98 FL (ref 80–100)
MONOCYTES # BLD AUTO: 0.21 X10*3/UL (ref 0.05–0.8)
MONOCYTES NFR BLD AUTO: 3.3 %
NEUTROPHILS # BLD AUTO: 5.97 X10*3/UL (ref 1.6–5.5)
NEUTROPHILS NFR BLD AUTO: 94.9 %
NRBC BLD-RTO: 0 /100 WBCS (ref 0–0)
PHOSPHATE SERPL-MCNC: 3.5 MG/DL (ref 2.5–4.9)
PHOSPHATE SERPL-MCNC: 4.5 MG/DL (ref 2.5–4.9)
PLATELET # BLD AUTO: 395 X10*3/UL (ref 150–450)
POTASSIUM SERPL-SCNC: 4.8 MMOL/L (ref 3.5–5.3)
POTASSIUM SERPL-SCNC: 4.9 MMOL/L (ref 3.5–5.3)
PROT SERPL-MCNC: 4.6 G/DL (ref 6.4–8.2)
RBC # BLD AUTO: 2.5 X10*6/UL (ref 4–5.2)
RBC MORPH BLD: NORMAL
SCHISTOCYTES BLD QL SMEAR: NORMAL
SODIUM SERPL-SCNC: 125 MMOL/L (ref 136–145)
SODIUM SERPL-SCNC: 128 MMOL/L (ref 136–145)
URATE SERPL-MCNC: 6 MG/DL (ref 2.3–6.7)
WBC # BLD AUTO: 31.5 X10*3/UL (ref 4.4–11.3)

## 2025-01-12 PROCEDURE — 80053 COMPREHEN METABOLIC PANEL: CPT

## 2025-01-12 PROCEDURE — 2500000005 HC RX 250 GENERAL PHARMACY W/O HCPCS

## 2025-01-12 PROCEDURE — 83735 ASSAY OF MAGNESIUM: CPT

## 2025-01-12 PROCEDURE — 2500000001 HC RX 250 WO HCPCS SELF ADMINISTERED DRUGS (ALT 637 FOR MEDICARE OP)

## 2025-01-12 PROCEDURE — 86140 C-REACTIVE PROTEIN: CPT

## 2025-01-12 PROCEDURE — 2500000004 HC RX 250 GENERAL PHARMACY W/ HCPCS (ALT 636 FOR OP/ED)

## 2025-01-12 PROCEDURE — 71045 X-RAY EXAM CHEST 1 VIEW: CPT

## 2025-01-12 PROCEDURE — 2500000001 HC RX 250 WO HCPCS SELF ADMINISTERED DRUGS (ALT 637 FOR MEDICARE OP): Performed by: NURSE PRACTITIONER

## 2025-01-12 PROCEDURE — 92610 EVALUATE SWALLOWING FUNCTION: CPT | Mod: GN

## 2025-01-12 PROCEDURE — 71045 X-RAY EXAM CHEST 1 VIEW: CPT | Performed by: RADIOLOGY

## 2025-01-12 PROCEDURE — 85384 FIBRINOGEN ACTIVITY: CPT

## 2025-01-12 PROCEDURE — 82248 BILIRUBIN DIRECT: CPT

## 2025-01-12 PROCEDURE — 2500000004 HC RX 250 GENERAL PHARMACY W/ HCPCS (ALT 636 FOR OP/ED): Performed by: INTERNAL MEDICINE

## 2025-01-12 PROCEDURE — 2500000004 HC RX 250 GENERAL PHARMACY W/ HCPCS (ALT 636 FOR OP/ED): Mod: TB | Performed by: NURSE PRACTITIONER

## 2025-01-12 PROCEDURE — 83615 LACTATE (LD) (LDH) ENZYME: CPT

## 2025-01-12 PROCEDURE — 82728 ASSAY OF FERRITIN: CPT

## 2025-01-12 PROCEDURE — 2500000002 HC RX 250 W HCPCS SELF ADMINISTERED DRUGS (ALT 637 FOR MEDICARE OP, ALT 636 FOR OP/ED)

## 2025-01-12 PROCEDURE — 99233 SBSQ HOSP IP/OBS HIGH 50: CPT | Performed by: INTERNAL MEDICINE

## 2025-01-12 PROCEDURE — 84550 ASSAY OF BLOOD/URIC ACID: CPT

## 2025-01-12 PROCEDURE — 1170000001 HC PRIVATE ONCOLOGY ROOM DAILY

## 2025-01-12 PROCEDURE — 85025 COMPLETE CBC W/AUTO DIFF WBC: CPT

## 2025-01-12 PROCEDURE — 80069 RENAL FUNCTION PANEL: CPT | Mod: CCI

## 2025-01-12 PROCEDURE — 84100 ASSAY OF PHOSPHORUS: CPT

## 2025-01-12 RX ORDER — FUROSEMIDE 10 MG/ML
40 INJECTION INTRAMUSCULAR; INTRAVENOUS ONCE
Status: COMPLETED | OUTPATIENT
Start: 2025-01-12 | End: 2025-01-12

## 2025-01-12 RX ORDER — MAGNESIUM SULFATE HEPTAHYDRATE 40 MG/ML
4 INJECTION, SOLUTION INTRAVENOUS ONCE
Status: DISPENSED | OUTPATIENT
Start: 2025-01-12

## 2025-01-12 RX ORDER — ONDANSETRON HYDROCHLORIDE 2 MG/ML
4 INJECTION, SOLUTION INTRAVENOUS EVERY 8 HOURS PRN
Status: ACTIVE | OUTPATIENT
Start: 2025-01-12

## 2025-01-12 RX ORDER — SODIUM CHLORIDE 1000 MG
1000 TABLET, SOLUBLE MISCELLANEOUS
Status: DISPENSED | OUTPATIENT
Start: 2025-01-12

## 2025-01-12 RX ADMIN — ROPINIROLE 0.5 MG: 0.5 TABLET, FILM COATED ORAL at 20:26

## 2025-01-12 RX ADMIN — ALLOPURINOL 300 MG: 300 TABLET ORAL at 09:31

## 2025-01-12 RX ADMIN — CALCITONIN SALMON 250 UNITS: 200 INJECTION, SOLUTION INTRAMUSCULAR; SUBCUTANEOUS at 06:16

## 2025-01-12 RX ADMIN — Medication 1 G: at 18:16

## 2025-01-12 RX ADMIN — PANTOPRAZOLE SODIUM 40 MG: 40 TABLET, DELAYED RELEASE ORAL at 06:16

## 2025-01-12 RX ADMIN — MAGNESIUM OXIDE TAB 400 MG (241.3 MG ELEMENTAL MG) 400 MG: 400 (241.3 MG) TAB at 06:16

## 2025-01-12 RX ADMIN — METOPROLOL SUCCINATE 100 MG: 50 TABLET, EXTENDED RELEASE ORAL at 09:31

## 2025-01-12 RX ADMIN — ACYCLOVIR 400 MG: 400 TABLET ORAL at 20:25

## 2025-01-12 RX ADMIN — OXYCODONE HYDROCHLORIDE 10 MG: 5 TABLET ORAL at 20:25

## 2025-01-12 RX ADMIN — DULOXETINE HYDROCHLORIDE 20 MG: 20 CAPSULE, DELAYED RELEASE ORAL at 09:32

## 2025-01-12 RX ADMIN — MAGNESIUM SULFATE 4 G: 4 INJECTION INTRAVENOUS at 21:31

## 2025-01-12 RX ADMIN — VANCOMYCIN HYDROCHLORIDE 125 MG: 125 CAPSULE ORAL at 18:16

## 2025-01-12 RX ADMIN — DEXAMETHASONE 16 MG: 4 TABLET ORAL at 09:31

## 2025-01-12 RX ADMIN — FLUCONAZOLE 400 MG: 200 TABLET ORAL at 09:31

## 2025-01-12 RX ADMIN — BUSPIRONE HYDROCHLORIDE 7.5 MG: 15 TABLET ORAL at 18:16

## 2025-01-12 RX ADMIN — VANCOMYCIN HYDROCHLORIDE 125 MG: 125 CAPSULE ORAL at 06:16

## 2025-01-12 RX ADMIN — AMLODIPINE BESYLATE 5 MG: 5 TABLET ORAL at 09:30

## 2025-01-12 RX ADMIN — OXYCODONE HYDROCHLORIDE 10 MG: 5 TABLET ORAL at 01:16

## 2025-01-12 RX ADMIN — BUSPIRONE HYDROCHLORIDE 7.5 MG: 15 TABLET ORAL at 20:26

## 2025-01-12 RX ADMIN — FUROSEMIDE 40 MG: 10 INJECTION, SOLUTION INTRAMUSCULAR; INTRAVENOUS at 09:30

## 2025-01-12 RX ADMIN — MAGNESIUM OXIDE TAB 400 MG (241.3 MG ELEMENTAL MG) 400 MG: 400 (241.3 MG) TAB at 18:16

## 2025-01-12 RX ADMIN — ACYCLOVIR 400 MG: 400 TABLET ORAL at 09:31

## 2025-01-12 RX ADMIN — BUSPIRONE HYDROCHLORIDE 7.5 MG: 15 TABLET ORAL at 09:32

## 2025-01-12 RX ADMIN — APIXABAN 5 MG: 5 TABLET, FILM COATED ORAL at 20:26

## 2025-01-12 RX ADMIN — APIXABAN 5 MG: 5 TABLET, FILM COATED ORAL at 09:31

## 2025-01-12 RX ADMIN — VANCOMYCIN HYDROCHLORIDE 125 MG: 125 CAPSULE ORAL at 20:26

## 2025-01-12 RX ADMIN — METHIMAZOLE 2.5 MG: 5 TABLET ORAL at 09:31

## 2025-01-12 RX ADMIN — AMIODARONE HYDROCHLORIDE 200 MG: 200 TABLET ORAL at 09:32

## 2025-01-12 RX ADMIN — LIDOCAINE 1 PATCH: 4 PATCH TOPICAL at 20:28

## 2025-01-12 ASSESSMENT — COGNITIVE AND FUNCTIONAL STATUS - GENERAL
TURNING FROM BACK TO SIDE WHILE IN FLAT BAD: A LITTLE
DRESSING REGULAR LOWER BODY CLOTHING: A LOT
HELP NEEDED FOR BATHING: A LOT
TOILETING: A LOT
CLIMB 3 TO 5 STEPS WITH RAILING: A LITTLE
PERSONAL GROOMING: A LITTLE
WALKING IN HOSPITAL ROOM: A LITTLE
EATING MEALS: A LITTLE
DRESSING REGULAR UPPER BODY CLOTHING: A LOT
MOBILITY SCORE: 18
MOVING FROM LYING ON BACK TO SITTING ON SIDE OF FLAT BED WITH BEDRAILS: A LITTLE
DAILY ACTIVITIY SCORE: 14
STANDING UP FROM CHAIR USING ARMS: A LITTLE
MOVING TO AND FROM BED TO CHAIR: A LITTLE

## 2025-01-12 ASSESSMENT — PAIN SCALES - GENERAL
PAINLEVEL_OUTOF10: 8
PAINLEVEL_OUTOF10: 7
PAINLEVEL_OUTOF10: 5 - MODERATE PAIN
PAINLEVEL_OUTOF10: 0 - NO PAIN

## 2025-01-12 ASSESSMENT — PAIN - FUNCTIONAL ASSESSMENT
PAIN_FUNCTIONAL_ASSESSMENT: UNABLE TO SELF-REPORT
PAIN_FUNCTIONAL_ASSESSMENT: 0-10

## 2025-01-12 ASSESSMENT — PAIN DESCRIPTION - LOCATION: LOCATION: BACK

## 2025-01-12 NOTE — CARE PLAN
Problem: Respiratory  Goal: Clear secretions with interventions this shift  Outcome: Progressing  Goal: Minimize anxiety/maximize coping throughout shift  Outcome: Progressing  Goal: Minimal/no exertional discomfort or dyspnea this shift  Outcome: Progressing  Goal: No signs of respiratory distress (eg. Use of accessory muscles. Peds grunting)  Outcome: Progressing  Goal: Patent airway maintained this shift  Outcome: Progressing  Goal: Tolerate mechanical ventilation evidenced by VS/agitation level this shift  Outcome: Progressing  Goal: Tolerate pulmonary toileting this shift  Outcome: Progressing  Goal: Verbalize decreased shortness of breath this shift  Outcome: Progressing  Goal: Wean oxygen to maintain O2 saturation per order/standard this shift  Outcome: Progressing  Goal: Increase self care and/or family involvement in next 24 hours  Outcome: Progressing     Problem: Fall/Injury  Goal: Not fall by end of shift  Outcome: Progressing  Goal: Be free from injury by end of the shift  Outcome: Progressing  Goal: Verbalize understanding of personal risk factors for fall in the hospital  Outcome: Progressing  Goal: Verbalize understanding of risk factor reduction measures to prevent injury from fall in the home  Outcome: Progressing  Goal: Use assistive devices by end of the shift  Outcome: Progressing  Goal: Pace activities to prevent fatigue by end of the shift  Outcome: Progressing     Problem: Infection related to problem list condition  Goal: Infection will resolve through treatment  Outcome: Progressing     Problem: Skin  Goal: Decreased wound size/increased tissue granulation at next dressing change  Outcome: Progressing  Goal: Participates in plan/prevention/treatment measures  Outcome: Progressing  Goal: Prevent/manage excess moisture  Outcome: Progressing  Goal: Prevent/minimize sheer/friction injuries  Outcome: Progressing  Goal: Promote/optimize nutrition  Outcome: Progressing  Goal: Promote skin  healing  Outcome: Progressing     Problem: Pain  Goal: Takes deep breaths with improved pain control throughout the shift  Outcome: Progressing  Goal: Turns in bed with improved pain control throughout the shift  Outcome: Progressing  Goal: Walks with improved pain control throughout the shift  Outcome: Progressing  Goal: Performs ADL's with improved pain control throughout shift  Outcome: Progressing  Goal: Participates in PT with improved pain control throughout the shift  Outcome: Progressing  Goal: Free from opioid side effects throughout the shift  Outcome: Progressing  Goal: Free from acute confusion related to pain meds throughout the shift  Outcome: Progressing     Problem: Pain - Adult  Goal: Verbalizes/displays adequate comfort level or baseline comfort level  Outcome: Progressing     Problem: Safety - Adult  Goal: Free from fall injury  Outcome: Progressing     Problem: Discharge Planning  Goal: Discharge to home or other facility with appropriate resources  Outcome: Progressing     Problem: Chronic Conditions and Co-morbidities  Goal: Patient's chronic conditions and co-morbidity symptoms are monitored and maintained or improved  Outcome: Progressing   The patient's goals for the shift include      The clinical goals for the shift include patient A&O 3-4, redirected to time, VSS, pain control with prn medication, ICE score 8/10 provider aware, remains fall free.

## 2025-01-12 NOTE — PROGRESS NOTES
Simi Sandoval is a 89 y.o. female on day 4 of admission presenting with DLBCL (diffuse large B cell lymphoma).    Subjective   Afebrile, VSS. No acute events overnight. Continues on supplemental oxygen. Had a bout of emesis this morning, does not report nausea. Neurologically grossly intact. No speech or language changes. No seizures. No HA. No NV. No e/o active bleeding or infection. No CP, SOB, cough.        Objective     Physical Exam  Constitutional:       Appearance: She is ill-appearing.   HENT:      Head: Normocephalic.      Nose: Nose normal.      Mouth/Throat:      Mouth: Mucous membranes are dry.      Pharynx: No oropharyngeal exudate or posterior oropharyngeal erythema.   Eyes:      General: No scleral icterus.     Extraocular Movements: Extraocular movements intact.      Pupils: Pupils are equal, round, and reactive to light.   Cardiovascular:      Rate and Rhythm: Normal rate and regular rhythm.      Pulses: Normal pulses.      Heart sounds: Normal heart sounds. No murmur heard.     No friction rub. No gallop.   Pulmonary:      Effort: Pulmonary effort is normal.      Breath sounds: Decreased air movement present.   Abdominal:      General: Bowel sounds are increased. There is no distension.      Palpations: Abdomen is soft.      Tenderness: There is abdominal tenderness in the right upper quadrant and right lower quadrant.   Musculoskeletal:         General: Tenderness (LLE) present.      Cervical back: Normal range of motion and neck supple.      Right lower leg: Edema present.      Left lower leg: Edema present.   Skin:     General: Skin is warm and dry.   Neurological:      General: No focal deficit present.      Mental Status: She is alert and oriented to person, place, and time. Mental status is at baseline.   Psychiatric:         Mood and Affect: Mood normal.         Behavior: Behavior normal.         Thought Content: Thought content normal.         Judgment: Judgment normal.         Last  Recorded Vitals  Vitals:    01/12/25 0300   BP: 124/75   Pulse: 64   Resp: 18   Temp: 36.9 °C (98.4 °F)   SpO2: 97%     Results for orders placed or performed during the hospital encounter of 01/08/25 (from the past 24 hours)   Renal function panel   Result Value Ref Range    Glucose 192 (H) 74 - 99 mg/dL    Sodium 130 (L) 136 - 145 mmol/L    Potassium 4.5 3.5 - 5.3 mmol/L    Chloride 94 (L) 98 - 107 mmol/L    Bicarbonate 26 21 - 32 mmol/L    Anion Gap 15 10 - 20 mmol/L    Urea Nitrogen 27 (H) 6 - 23 mg/dL    Creatinine 1.10 (H) 0.50 - 1.05 mg/dL    eGFR 49 (L) >60 mL/min/1.73m*2    Calcium 10.8 (H) 8.6 - 10.6 mg/dL    Phosphorus 2.4 (L) 2.5 - 4.9 mg/dL    Albumin 2.7 (L) 3.4 - 5.0 g/dL   Magnesium   Result Value Ref Range    Magnesium 1.67 1.60 - 2.40 mg/dL   Type And Screen   Result Value Ref Range    ABO TYPE O     Rh TYPE POS     ANTIBODY SCREEN NEG    Comprehensive Metabolic Panel   Result Value Ref Range    Glucose 140 (H) 74 - 99 mg/dL    Sodium 128 (L) 136 - 145 mmol/L    Potassium 4.9 3.5 - 5.3 mmol/L    Chloride 92 (L) 98 - 107 mmol/L    Bicarbonate 26 21 - 32 mmol/L    Anion Gap 15 10 - 20 mmol/L    Urea Nitrogen 30 (H) 6 - 23 mg/dL    Creatinine 1.06 (H) 0.50 - 1.05 mg/dL    eGFR 51 (L) >60 mL/min/1.73m*2    Calcium 10.5 8.6 - 10.6 mg/dL    Albumin 2.7 (L) 3.4 - 5.0 g/dL    Alkaline Phosphatase 81 33 - 136 U/L    Total Protein 4.6 (L) 6.4 - 8.2 g/dL    AST 22 9 - 39 U/L    Bilirubin, Total 0.3 0.0 - 1.2 mg/dL    ALT 24 7 - 45 U/L   Magnesium   Result Value Ref Range    Magnesium 1.61 1.60 - 2.40 mg/dL   Phosphorus   Result Value Ref Range    Phosphorus 4.5 2.5 - 4.9 mg/dL   C-Reactive Protein   Result Value Ref Range    C-Reactive Protein 0.36 <1.00 mg/dL   Ferritin   Result Value Ref Range    Ferritin 352 (H) 8 - 150 ng/mL   Fibrinogen   Result Value Ref Range    Fibrinogen 302 200 - 400 mg/dL   Lactate Dehydrogenase   Result Value Ref Range     (H) 84 - 246 U/L   Uric Acid   Result Value Ref  Range    Uric Acid 6.0 2.3 - 6.7 mg/dL   Bilirubin, Direct   Result Value Ref Range    Bilirubin, Direct 0.1 0.0 - 0.3 mg/dL     *Note: Due to a large number of results and/or encounters for the requested time period, some results have not been displayed. A complete set of results can be found in Results Review.      Intake/Output last 3 Shifts:  I/O last 3 completed shifts:  In: 250 (3.9 mL/kg) [IV Piggyback:250]  Out: 370 (5.8 mL/kg) [Urine:370 (0.2 mL/kg/hr)]  Weight: 63.4 kg     Scheduled medications  acyclovir, 400 mg, oral, q12h VERONICA  allopurinol, 300 mg, oral, Daily  amiodarone, 200 mg, oral, Daily  amLODIPine, 5 mg, oral, Daily  apixaban, 5 mg, oral, BID  busPIRone, 7.5 mg, oral, TID  dexAMETHasone, 16 mg, oral, Daily  DULoxetine, 20 mg, oral, Daily  fluconazole, 400 mg, oral, Daily  lidocaine, 1 patch, transdermal, Daily  magnesium oxide, 400 mg, oral, q12h VERONICA  methIMAzole, 2.5 mg, oral, Daily  metoprolol succinate XL, 100 mg, oral, Daily  pantoprazole, 40 mg, oral, Daily before breakfast  rOPINIRole, 0.5 mg, oral, Nightly  vancomycin, 125 mg, oral, 4x daily      Continuous medications     PRN medications  PRN medications: albuterol, alteplase, dextrose, diphenhydrAMINE, EPINEPHrine HCl, famotidine, ipratropium-albuteroL, melatonin, methocarbamol, methylPREDNISolone sodium succinate (PF), moisturizing mouth, ondansetron, oxyCODONE, prochlorperazine, prochlorperazine, sodium chloride       Assessment & Plan  DLBCL (diffuse large B cell lymphoma)    Simi Sandoval is an 89-year-old F with PMH DLBCL of left breast, A-fib (on Eliquis), pulmonary hypertension, Hysterectomy, Thyroid disease,  CHF, NSTEMI, s/p uretral stent (12/13) who comes in for evaluation of left flank pain, left  sided abdominal pain  and diarrhea at OSH on 12/27. Colonoscopy completed 12/30 and was found to have Cdiff colitis  with pancolitis. Pt was started on Vanco for Pancolitis and was treated with zosyn for aspiration pneumonia seen  on CT A/P S/P colonoscopy. CT imaging found multiple pulmonary nodules bilaterally, largest dependently on the left measuring 2.6 cm. Biopsy of left lung nodule done 12/11/2024 showed diffuse aggressive large B-cell lymphoma, recurrent by history. Heme/Onc saw the pt at OSH and recommended to transfer to [facility] for further work up, evaluation and treatment of disease recurrence.    Active Issues 01/12/25  #DLBCL; day 3 of epcoritamab, cont allopurinol, TLS/CRS labs appear grossly stable   #Cdiff colitis; continues on oral vanco until 1/13 -- stools seem to be slowing down   #hypercalcemia of malignancy; s/p zometax1, calcitonin x4 doses. Ca+ improved 1/12 to 10.5  #Cancer-related pain: will modestly increase PRN oxy dose. Per Supportive Onc, will see her on Monday  #Anxiety: Cont current meds. Per Supportive Onc, will see her on Monday  #Pulmonary congestion; CXR 1/12 showing pulmonary congestion, small left pleural effusion. Will give 40mg IV lasix today  #Hyponatremia; likely 2/2 FVO, restart home NaCl tabs of 1g BID, restrict PO free water to 1L daily    ONC   #Diffuse large B-cell lymphoma   - Last treated with Tafasitamab 5/16/2023   - New lung nodules favoring recurrence of lymphoma back in December.    - last chemo in 2023   - Pathology from lung nodule biopsy 12/11/2024 confirmed disease recurrence .  - CT Flank 12/27/2024 shows disease progression in pulmonary nodules and new cortical lytic lesions in L4-5.   - S/p Dexamethasone 4 mg BID at OSH by Heme/Onc and ordered MRI per Oncology at OSH on admission to rule out cord compression.  - MRI 1/9: negative for cord compression, soft tissue masses involving the paraspinal musculature, left greater than right with involvement of the left lateral epidural space at the L5 level resulting in moderate spinal canal stenosis  - Continue C1 epcoritamab (D1: 1/10/25). Today is Day 3. daily CRS labs, BID TLS labs    HEME  #Leukocytosis  -WBC 25.4 on admit  -most likely  secondary to colitis  #Anemia  -Hgb 10 on admit  -Transfuse if Hgb<7  -Monitor CBC daily    ID  # C. diff colitis with pan colitis  (Resolving)  - CT A/P shows mild pancolitis on distal rectosigmoid and pseudomembranes colitis on Colonoscopy (12/30)   - S/p PO Vanco at OSH and continue po vancomycin to 125mg qid on admit with last doses 1/13/25.   #Prophy  - ACV, fluconazole    PULM  #Acute hypoxic Resp failure    #Aspiration Pneumonia  - Doesn't wear O2 at baseline, wean as tolerated  - Patient likely with aspiration pneumonia status post colonoscopy on 12/30/2024.  She was supine laying on her left side which is where the infiltrate was shown on chest x-ray.  Concern for aspiration during procedure  - S/p 7 day course of Zosyn at OSH  - CXR on admission shows with mild-to-moderate degree of pulmonary interstitial edema. Small left and right pleural effusion  - 40mg IV lasix (1/9) for pulm edema  - S/p bipap for acute hypoxic respiratory failure at OSH  - S/p intubation in 11/2024 for pneumonia and CHF exacerbation  - Repeat CXR (1/11) with pulmonary congestion, small left pleural effusion  - Lasix 40mg IV x1 (1/12)     FEN/GI  Admit wt: 64 kg, current wt: 63.4 kg  F: PRN  E: PRN  N: Low path, 1L fluid restriction  #GURINDER (resolved)   - sCr 1.01 on admit  - Monitor CMP daily  # Diarrhea  :: 2/2 c.diff colitis  - Management as above  # Hypercalcemia, improving  - sCa 12.6 on admit, 1/10: 12.4, 1/12: 10.5  - Increase mIVF to 100ml/hr, stopped fluids 1/10 with consideration of hx of CHF  - Zometa 4mg x1 (1/9)  - Calcitonin BID x2 doses (1/10) -- repeat 2 doses on 1/1  #Hyponatremia  :: 2/2 fluid volume overload  - S/p IVF NS on admit x48hrs  - Initiate fluid restriction of 1L (1/12)  - Restart home NaCl tabs 1g BID  - Monitor daily     URO  # Left hydronephrosis, Urinary Retention s/p Left ureteral stent placed on 12/13/24   - Seen by Urology at OSH  - S/p maki removed on 1/5   - Voiding fine on admit     CARDS   -  LVEF 57%. Vegetation noted on prior echo.  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 She denies any increase or change in her neuropathy symptoms. Per Neurology at OSH  - Continue supportive measures    - No further neurological testing needed at this point      MSK   # Lumbar Spinal stenosis    - Started on Oxy 5 mg PRN and cont with Robaxin 500 mg TID PRN -- will cautiously increase oxy as of 1/11 and consult Supportive Onc (they will see Monday)      PSYCH   # Anxiety   - C/w Buspar 7.5 mg TID and Cymbalta  -consult Supportive Onc (they will see Monday)      ENDO   # Hyperthyroidism   - C/w methimazole      Dispo  - Full code  - R Med port  - Primary Oncologist Dr Mac    Patient seen, examined, and discussed with Dr. Stella Mac.         Delmer Hendrickson, CORNELIUS-CNP

## 2025-01-12 NOTE — PROGRESS NOTES
Adult SLP Clinical Swallow Evaluation    Patient Name: Simi Sandoval  MRN: [id number]  Today's Date: 1/12/2025   Time Calculation  Start Time: 1445  Stop Time: 1505  Time Calculation (min): 20 min       Recommendations:  Solid Diet Recommendations : Regular (IDDSI Level 7)  Liquid Diet Recommendations: Thin (IDDSI Level 0)  Avoid dry foods and add moisture to solids to increase ease of mastication/bolus formation  Frequent oral care    Assessment:  Clinical swallow evaluation completed revealing functional oral phase and no s/s aspiration thus pharyngeal dysphagia is not suspected. Recommend pt resume baseline regular texture diet. No further SLP warranted, please reconsult if new concerns arise.     Plan:  SLP Plan: No skilled SLP  No Skilled SLP: At baseline function, No acute SLP goals identified  Discussed POC: Patient, Caregiver/family, Nursing, Physician  Discussed Risks/Benefits: Yes  SLP - OK to Discharge: Yes      Subjective     History and Physical:    Simi Sandoval is an 89-year-old F with PMH DLBCL of left breast, A-fib (on Eliquis), pulmonary hypertension, Hysterectomy, Thyroid disease, CHF, NSTEMI, s/p uretral stent (12/13) who comes in for evaluation of left flank pain, left sided abdominal pain and diarrhea at OSH on 12/27. Colonoscopy completed 12/30 and was found to have Cdiff colitis with pancolitis. Pt was started on Vanco for Pancolitis and was treated with zosyn for aspiration pneumonia seen on CT A/P S/P colonoscopy. CT imaging found multiple pulmonary nodules bilaterally, largest dependently on the left measuring 2.6 cm. Biopsy of left lung nodule done 12/11/2024 showed diffuse aggressive large B-cell lymphoma, recurrent by history. Heme/Onc saw the pt at OSH and recommended to transfer to [facility] for further work up, evaluation and treatment of disease recurrence.      SLP consulted 2/2 pt munching on pills earlier and then w/ emesis x2 after pills.     Relevant SLP Hx:  SLP eval  at OSH last week revealing mild oral deficits w/ regular solids and no concern for pharyngeal swallow w/ recs for regular texture diet.     Subjective Presentation:  Pt received upright and alert. Oriented x4. Willing to participate. Breathing on room air.     Pt denies hx of dysphagia.     Nephew present at bedside.       Objective     Oral/Motor Assessment:  Oral Hygiene: min amount of sticky secretions from lingual surface to palate  Dentition: Complete Dentures  Oral Motor: Within Functional Limits  Vocal Quality: Within Functional Limits  Intelligibility: Intelligible  Volitional cough: perceptually intact    Clinical Observations:  Textures Trialed: Thin Liquid , Puree, and Regular Solid  3oz Water Protocol Utilized: yes pass    Pt consumed trials of ice chips, tsps water, and straw sips of water without anterior spillage and no s/s aspiration. Proceed to 3oz water assessment via straw sips which pt successfully completed without s/s aspiration. Trials then presented of puree and regular solids.  Noted prolonged mastication of regular solids, suspect dry mouth impacting; liquid wash improved presentation and reduced oral residue to min. No s/s aspiration observed across all trials.       Inpatient Education:  Pt educated on result and recommendations. Pt indicated understanding.

## 2025-01-13 ENCOUNTER — APPOINTMENT (OUTPATIENT)
Dept: RADIOLOGY | Facility: HOSPITAL | Age: 89
DRG: 840 | End: 2025-01-13
Payer: MEDICARE

## 2025-01-13 ENCOUNTER — APPOINTMENT (OUTPATIENT)
Dept: VASCULAR MEDICINE | Facility: HOSPITAL | Age: 89
DRG: 840 | End: 2025-01-13
Payer: MEDICARE

## 2025-01-13 LAB
ALBUMIN SERPL BCP-MCNC: 2.6 G/DL (ref 3.4–5)
ALBUMIN SERPL BCP-MCNC: 2.7 G/DL (ref 3.4–5)
ALP SERPL-CCNC: 81 U/L (ref 33–136)
ALT SERPL W P-5'-P-CCNC: 22 U/L (ref 7–45)
ANION GAP BLDA CALCULATED.4IONS-SCNC: 7 MMO/L (ref 10–25)
ANION GAP SERPL CALC-SCNC: 13 MMOL/L (ref 10–20)
ANION GAP SERPL CALC-SCNC: 13 MMOL/L (ref 10–20)
APPEARANCE UR: ABNORMAL
AST SERPL W P-5'-P-CCNC: 18 U/L (ref 9–39)
BASE EXCESS BLDA CALC-SCNC: 1.9 MMOL/L (ref -2–3)
BASOPHILS # BLD MANUAL: 0 X10*3/UL (ref 0–0.1)
BASOPHILS NFR BLD MANUAL: 0 %
BILIRUB DIRECT SERPL-MCNC: 0.1 MG/DL (ref 0–0.3)
BILIRUB SERPL-MCNC: 0.4 MG/DL (ref 0–1.2)
BILIRUB UR STRIP.AUTO-MCNC: NEGATIVE MG/DL
BODY TEMPERATURE: 37 DEGREES CELSIUS
BUN SERPL-MCNC: 38 MG/DL (ref 6–23)
BUN SERPL-MCNC: 42 MG/DL (ref 6–23)
CA-I BLDA-SCNC: 1.41 MMOL/L (ref 1.1–1.33)
CALCIUM SERPL-MCNC: 9.6 MG/DL (ref 8.6–10.6)
CALCIUM SERPL-MCNC: 9.9 MG/DL (ref 8.6–10.6)
CHLORIDE BLDA-SCNC: 94 MMOL/L (ref 98–107)
CHLORIDE SERPL-SCNC: 90 MMOL/L (ref 98–107)
CHLORIDE SERPL-SCNC: 90 MMOL/L (ref 98–107)
CHLORIDE UR-SCNC: <15 MMOL/L
CHLORIDE/CREATININE (MMOL/G) IN URINE: NORMAL
CO2 SERPL-SCNC: 27 MMOL/L (ref 21–32)
CO2 SERPL-SCNC: 27 MMOL/L (ref 21–32)
COLOR UR: ABNORMAL
CREAT SERPL-MCNC: 1.09 MG/DL (ref 0.5–1.05)
CREAT SERPL-MCNC: 1.29 MG/DL (ref 0.5–1.05)
CREAT UR-MCNC: 90 MG/DL (ref 20–320)
CREAT UR-MCNC: 90 MG/DL (ref 20–320)
CRP SERPL-MCNC: 0.3 MG/DL
EGFRCR SERPLBLD CKD-EPI 2021: 40 ML/MIN/1.73M*2
EGFRCR SERPLBLD CKD-EPI 2021: 49 ML/MIN/1.73M*2
EOSINOPHIL # BLD MANUAL: 0 X10*3/UL (ref 0–0.4)
EOSINOPHIL NFR BLD MANUAL: 0 %
ERYTHROCYTE [DISTWIDTH] IN BLOOD BY AUTOMATED COUNT: 15.7 % (ref 11.5–14.5)
FERRITIN SERPL-MCNC: 382 NG/ML (ref 8–150)
FIBRINOGEN PPP-MCNC: 263 MG/DL (ref 200–400)
GLUCOSE BLDA-MCNC: 140 MG/DL (ref 74–99)
GLUCOSE SERPL-MCNC: 121 MG/DL (ref 74–99)
GLUCOSE SERPL-MCNC: 141 MG/DL (ref 74–99)
GLUCOSE UR STRIP.AUTO-MCNC: NORMAL MG/DL
HCO3 BLDA-SCNC: 25.5 MMOL/L (ref 22–26)
HCT VFR BLD AUTO: 24.6 % (ref 36–46)
HCT VFR BLD EST: 29 % (ref 36–46)
HGB BLD-MCNC: 9.2 G/DL (ref 12–16)
HGB BLDA-MCNC: 9.8 G/DL (ref 12–16)
IMM GRANULOCYTES # BLD AUTO: 0.28 X10*3/UL (ref 0–0.5)
IMM GRANULOCYTES NFR BLD AUTO: 1.3 % (ref 0–0.9)
INHALED O2 CONCENTRATION: 50 %
KETONES UR STRIP.AUTO-MCNC: NEGATIVE MG/DL
LACTATE BLDA-SCNC: 1.7 MMOL/L (ref 0.4–2)
LDH SERPL L TO P-CCNC: 391 U/L (ref 84–246)
LEUKOCYTE ESTERASE UR QL STRIP.AUTO: ABNORMAL
LYMPHOCYTES # BLD MANUAL: 0.19 X10*3/UL (ref 0.8–3)
LYMPHOCYTES NFR BLD MANUAL: 0.9 %
MAGNESIUM SERPL-MCNC: 2.19 MG/DL (ref 1.6–2.4)
MAGNESIUM SERPL-MCNC: 2.33 MG/DL (ref 1.6–2.4)
MCH RBC QN AUTO: 35.5 PG (ref 26–34)
MCHC RBC AUTO-ENTMCNC: 37.4 G/DL (ref 32–36)
MCV RBC AUTO: 95 FL (ref 80–100)
MONOCYTES # BLD MANUAL: 0.92 X10*3/UL (ref 0.05–0.8)
MONOCYTES NFR BLD MANUAL: 4.3 %
NEUTS SEG # BLD MANUAL: 20.29 X10*3/UL (ref 1.6–5)
NEUTS SEG NFR BLD MANUAL: 94.8 %
NITRITE UR QL STRIP.AUTO: NEGATIVE
NRBC BLD-RTO: 0.1 /100 WBCS (ref 0–0)
OSMOLALITY UR: 589 MOSM/KG (ref 200–1200)
OVALOCYTES BLD QL SMEAR: ABNORMAL
OXYHGB MFR BLDA: 93.4 % (ref 94–98)
PCO2 BLDA: 35 MM HG (ref 38–42)
PH BLDA: 7.47 PH (ref 7.38–7.42)
PH UR STRIP.AUTO: 5.5 [PH]
PHOSPHATE SERPL-MCNC: 3.1 MG/DL (ref 2.5–4.9)
PHOSPHATE SERPL-MCNC: 3.9 MG/DL (ref 2.5–4.9)
PLATELET # BLD AUTO: 452 X10*3/UL (ref 150–450)
PO2 BLDA: 72 MM HG (ref 85–95)
POTASSIUM BLDA-SCNC: 5.1 MMOL/L (ref 3.5–5.3)
POTASSIUM SERPL-SCNC: 4.9 MMOL/L (ref 3.5–5.3)
POTASSIUM SERPL-SCNC: 5 MMOL/L (ref 3.5–5.3)
POTASSIUM UR-SCNC: 58 MMOL/L
POTASSIUM/CREAT UR-RTO: 64 MMOL/G CREAT
PROT SERPL-MCNC: 4.6 G/DL (ref 6.4–8.2)
PROT UR STRIP.AUTO-MCNC: ABNORMAL MG/DL
RBC # BLD AUTO: 2.59 X10*6/UL (ref 4–5.2)
RBC # UR STRIP.AUTO: ABNORMAL /UL
RBC #/AREA URNS AUTO: >20 /HPF
RBC MORPH BLD: ABNORMAL
SAO2 % BLDA: 96 % (ref 94–100)
SODIUM BLDA-SCNC: 121 MMOL/L (ref 136–145)
SODIUM SERPL-SCNC: 125 MMOL/L (ref 136–145)
SODIUM SERPL-SCNC: 125 MMOL/L (ref 136–145)
SODIUM UR-SCNC: 14 MMOL/L
SODIUM UR-SCNC: 14 MMOL/L
SODIUM/CREAT UR-RTO: 16 MMOL/G CREAT
SODIUM/CREAT UR-RTO: 16 MMOL/G CREAT
SP GR UR STRIP.AUTO: 1.02
SP GR UR STRIP.AUTO: 1.02
SQUAMOUS #/AREA URNS AUTO: ABNORMAL /HPF
TOTAL CELLS COUNTED BLD: 115
URATE SERPL-MCNC: 6.2 MG/DL (ref 2.3–6.7)
UROBILINOGEN UR STRIP.AUTO-MCNC: NORMAL MG/DL
WBC # BLD AUTO: 21.4 X10*3/UL (ref 4.4–11.3)
WBC #/AREA URNS AUTO: ABNORMAL /HPF

## 2025-01-13 PROCEDURE — 2500000001 HC RX 250 WO HCPCS SELF ADMINISTERED DRUGS (ALT 637 FOR MEDICARE OP): Performed by: NURSE PRACTITIONER

## 2025-01-13 PROCEDURE — 82248 BILIRUBIN DIRECT: CPT

## 2025-01-13 PROCEDURE — 84550 ASSAY OF BLOOD/URIC ACID: CPT

## 2025-01-13 PROCEDURE — 81001 URINALYSIS AUTO W/SCOPE: CPT | Performed by: NURSE PRACTITIONER

## 2025-01-13 PROCEDURE — 2500000001 HC RX 250 WO HCPCS SELF ADMINISTERED DRUGS (ALT 637 FOR MEDICARE OP)

## 2025-01-13 PROCEDURE — 83615 LACTATE (LD) (LDH) ENZYME: CPT

## 2025-01-13 PROCEDURE — 82436 ASSAY OF URINE CHLORIDE: CPT | Performed by: NURSE PRACTITIONER

## 2025-01-13 PROCEDURE — 85007 BL SMEAR W/DIFF WBC COUNT: CPT

## 2025-01-13 PROCEDURE — 93970 EXTREMITY STUDY: CPT

## 2025-01-13 PROCEDURE — 71045 X-RAY EXAM CHEST 1 VIEW: CPT

## 2025-01-13 PROCEDURE — 36600 WITHDRAWAL OF ARTERIAL BLOOD: CPT

## 2025-01-13 PROCEDURE — 83735 ASSAY OF MAGNESIUM: CPT

## 2025-01-13 PROCEDURE — 82728 ASSAY OF FERRITIN: CPT

## 2025-01-13 PROCEDURE — 2500000004 HC RX 250 GENERAL PHARMACY W/ HCPCS (ALT 636 FOR OP/ED): Performed by: NURSE PRACTITIONER

## 2025-01-13 PROCEDURE — 86140 C-REACTIVE PROTEIN: CPT

## 2025-01-13 PROCEDURE — 84100 ASSAY OF PHOSPHORUS: CPT

## 2025-01-13 PROCEDURE — 84132 ASSAY OF SERUM POTASSIUM: CPT

## 2025-01-13 PROCEDURE — 82374 ASSAY BLOOD CARBON DIOXIDE: CPT

## 2025-01-13 PROCEDURE — 2500000004 HC RX 250 GENERAL PHARMACY W/ HCPCS (ALT 636 FOR OP/ED)

## 2025-01-13 PROCEDURE — 81003 URINALYSIS AUTO W/O SCOPE: CPT | Performed by: NURSE PRACTITIONER

## 2025-01-13 PROCEDURE — 87186 SC STD MICRODIL/AGAR DIL: CPT | Performed by: NURSE PRACTITIONER

## 2025-01-13 PROCEDURE — 2500000002 HC RX 250 W HCPCS SELF ADMINISTERED DRUGS (ALT 637 FOR MEDICARE OP, ALT 636 FOR OP/ED)

## 2025-01-13 PROCEDURE — 93970 EXTREMITY STUDY: CPT | Performed by: SURGERY

## 2025-01-13 PROCEDURE — 85384 FIBRINOGEN ACTIVITY: CPT

## 2025-01-13 PROCEDURE — 1170000001 HC PRIVATE ONCOLOGY ROOM DAILY

## 2025-01-13 PROCEDURE — 71045 X-RAY EXAM CHEST 1 VIEW: CPT | Performed by: RADIOLOGY

## 2025-01-13 PROCEDURE — 85027 COMPLETE CBC AUTOMATED: CPT

## 2025-01-13 PROCEDURE — 99233 SBSQ HOSP IP/OBS HIGH 50: CPT | Performed by: STUDENT IN AN ORGANIZED HEALTH CARE EDUCATION/TRAINING PROGRAM

## 2025-01-13 PROCEDURE — 84300 ASSAY OF URINE SODIUM: CPT | Performed by: NURSE PRACTITIONER

## 2025-01-13 PROCEDURE — 80069 RENAL FUNCTION PANEL: CPT | Mod: CCI

## 2025-01-13 PROCEDURE — 2500000004 HC RX 250 GENERAL PHARMACY W/ HCPCS (ALT 636 FOR OP/ED): Performed by: INTERNAL MEDICINE

## 2025-01-13 PROCEDURE — 83935 ASSAY OF URINE OSMOLALITY: CPT | Performed by: NURSE PRACTITIONER

## 2025-01-13 RX ORDER — FUROSEMIDE 10 MG/ML
40 INJECTION INTRAMUSCULAR; INTRAVENOUS ONCE
Status: COMPLETED | OUTPATIENT
Start: 2025-01-13 | End: 2025-01-13

## 2025-01-13 RX ADMIN — ACYCLOVIR 400 MG: 400 TABLET ORAL at 11:37

## 2025-01-13 RX ADMIN — MAGNESIUM OXIDE TAB 400 MG (241.3 MG ELEMENTAL MG) 400 MG: 400 (241.3 MG) TAB at 17:56

## 2025-01-13 RX ADMIN — DEXAMETHASONE 16 MG: 4 TABLET ORAL at 11:37

## 2025-01-13 RX ADMIN — METOPROLOL SUCCINATE 100 MG: 50 TABLET, EXTENDED RELEASE ORAL at 11:37

## 2025-01-13 RX ADMIN — FUROSEMIDE 40 MG: 10 INJECTION, SOLUTION INTRAMUSCULAR; INTRAVENOUS at 20:20

## 2025-01-13 RX ADMIN — MAGNESIUM OXIDE TAB 400 MG (241.3 MG ELEMENTAL MG) 400 MG: 400 (241.3 MG) TAB at 06:01

## 2025-01-13 RX ADMIN — PANTOPRAZOLE SODIUM 40 MG: 40 TABLET, DELAYED RELEASE ORAL at 06:01

## 2025-01-13 RX ADMIN — AMLODIPINE BESYLATE 5 MG: 5 TABLET ORAL at 11:37

## 2025-01-13 RX ADMIN — OXYCODONE HYDROCHLORIDE 10 MG: 5 TABLET ORAL at 06:01

## 2025-01-13 RX ADMIN — BUSPIRONE HYDROCHLORIDE 7.5 MG: 15 TABLET ORAL at 11:37

## 2025-01-13 RX ADMIN — SODIUM CHLORIDE 500 ML: 9 INJECTION, SOLUTION INTRAVENOUS at 11:27

## 2025-01-13 RX ADMIN — VANCOMYCIN HYDROCHLORIDE 125 MG: 125 CAPSULE ORAL at 06:01

## 2025-01-13 RX ADMIN — BUSPIRONE HYDROCHLORIDE 7.5 MG: 15 TABLET ORAL at 21:16

## 2025-01-13 RX ADMIN — VANCOMYCIN HYDROCHLORIDE 125 MG: 125 CAPSULE ORAL at 17:56

## 2025-01-13 RX ADMIN — APIXABAN 5 MG: 5 TABLET, FILM COATED ORAL at 21:16

## 2025-01-13 RX ADMIN — ACYCLOVIR 400 MG: 400 TABLET ORAL at 21:16

## 2025-01-13 RX ADMIN — DULOXETINE HYDROCHLORIDE 20 MG: 20 CAPSULE, DELAYED RELEASE ORAL at 11:37

## 2025-01-13 RX ADMIN — ROPINIROLE 0.5 MG: 0.5 TABLET, FILM COATED ORAL at 21:16

## 2025-01-13 RX ADMIN — BUSPIRONE HYDROCHLORIDE 7.5 MG: 15 TABLET ORAL at 14:27

## 2025-01-13 RX ADMIN — AMIODARONE HYDROCHLORIDE 200 MG: 200 TABLET ORAL at 11:37

## 2025-01-13 RX ADMIN — ALLOPURINOL 300 MG: 300 TABLET ORAL at 11:37

## 2025-01-13 RX ADMIN — METHIMAZOLE 2.5 MG: 5 TABLET ORAL at 11:37

## 2025-01-13 RX ADMIN — FLUCONAZOLE 400 MG: 200 TABLET ORAL at 11:37

## 2025-01-13 RX ADMIN — VANCOMYCIN HYDROCHLORIDE 125 MG: 125 CAPSULE ORAL at 14:27

## 2025-01-13 RX ADMIN — SODIUM CHLORIDE 1 G: 1 TABLET ORAL at 11:37

## 2025-01-13 RX ADMIN — APIXABAN 5 MG: 5 TABLET, FILM COATED ORAL at 11:37

## 2025-01-13 ASSESSMENT — COGNITIVE AND FUNCTIONAL STATUS - GENERAL
DRESSING REGULAR UPPER BODY CLOTHING: A LOT
MOVING TO AND FROM BED TO CHAIR: A LOT
CLIMB 3 TO 5 STEPS WITH RAILING: A LOT
WALKING IN HOSPITAL ROOM: A LOT
STANDING UP FROM CHAIR USING ARMS: A LOT
PERSONAL GROOMING: A LOT
EATING MEALS: TOTAL
MOBILITY SCORE: 13
DRESSING REGULAR LOWER BODY CLOTHING: A LOT
TOILETING: A LOT
TURNING FROM BACK TO SIDE WHILE IN FLAT BAD: A LOT
MOVING FROM LYING ON BACK TO SITTING ON SIDE OF FLAT BED WITH BEDRAILS: A LITTLE
HELP NEEDED FOR BATHING: A LOT
DAILY ACTIVITIY SCORE: 11

## 2025-01-13 ASSESSMENT — PAIN SCALES - GENERAL
PAINLEVEL_OUTOF10: 0 - NO PAIN
PAINLEVEL_OUTOF10: 7
PAINLEVEL_OUTOF10: 0 - NO PAIN

## 2025-01-13 ASSESSMENT — PAIN - FUNCTIONAL ASSESSMENT: PAIN_FUNCTIONAL_ASSESSMENT: 0-10

## 2025-01-13 NOTE — CARE PLAN
Problem: Fall/Injury  Goal: Not fall by end of shift  Outcome: Progressing     Problem: Skin  Goal: Promote skin healing  1/13/2025 1754 by Chantelle Herring RN  Outcome: Progressing  1/13/2025 1754 by Chantelle Herring RN  Flowsheets (Taken 1/13/2025 1754)  Promote skin healing: Turn/reposition every 2 hours/use positioning/transfer devices     Problem: Pain  Goal: Turns in bed with improved pain control throughout the shift  Outcome: Progressing     Problem: Discharge Planning  Goal: Discharge to home or other facility with appropriate resources  Outcome: Progressing     Problem: Chronic Conditions and Co-morbidities  Goal: Patient's chronic conditions and co-morbidity symptoms are monitored and maintained or improved  Outcome: Progressing     VSS, afebrile, no complaints N/V/D this shift. Pt remained safe and free of falls/injury. No pain reported this shift. Patient ICE score 6/10, RASHAAD Sarabia made aware. Patient received 500ml bolus of normal saline for dehydration. Report given to Ric MATTA RN.    Chantelle Herring RN

## 2025-01-13 NOTE — PROGRESS NOTES
Occupational Therapy                 Therapy Communication Note    Patient Name: Simi Sandoval  MRN: [id number]  Department: [id number]  Room: [facility]-A  Today's Date: 1/13/2025     Discipline: Occupational Therapy    OT Missed Visit: Yes     Missed Visit Reason: Missed Visit Reason: Patient in a medical procedure (Currently off division at vascular, will re-attempt as medically appropriate and able.)    Missed Time: Attempt    01/13/25 at 8:22 AM   Grace Ruiz OT   Rehab Office: [phone]

## 2025-01-13 NOTE — CARE PLAN
The patient's goals for the shift include      The clinical goals for the shift include pt will remain free from injury, hds, and vss throughout shift.    Problem: Respiratory  Goal: Clear secretions with interventions this shift  Outcome: Progressing  Goal: Minimize anxiety/maximize coping throughout shift  Outcome: Progressing  Goal: Minimal/no exertional discomfort or dyspnea this shift  Outcome: Progressing  Goal: No signs of respiratory distress (eg. Use of accessory muscles. Peds grunting)  Outcome: Progressing  Goal: Patent airway maintained this shift  Outcome: Progressing  Goal: Tolerate mechanical ventilation evidenced by VS/agitation level this shift  Outcome: Progressing  Goal: Tolerate pulmonary toileting this shift  Outcome: Progressing  Goal: Verbalize decreased shortness of breath this shift  Outcome: Progressing  Goal: Wean oxygen to maintain O2 saturation per order/standard this shift  Outcome: Progressing  Goal: Increase self care and/or family involvement in next 24 hours  Outcome: Progressing     Problem: Fall/Injury  Goal: Not fall by end of shift  Outcome: Progressing  Goal: Be free from injury by end of the shift  Outcome: Progressing  Goal: Verbalize understanding of personal risk factors for fall in the hospital  Outcome: Progressing  Goal: Verbalize understanding of risk factor reduction measures to prevent injury from fall in the home  Outcome: Progressing  Goal: Use assistive devices by end of the shift  Outcome: Progressing  Goal: Pace activities to prevent fatigue by end of the shift  Outcome: Progressing     Problem: Infection related to problem list condition  Goal: Infection will resolve through treatment  Outcome: Progressing     Problem: Skin  Goal: Decreased wound size/increased tissue granulation at next dressing change  Outcome: Progressing  Goal: Participates in plan/prevention/treatment measures  Outcome: Progressing  Goal: Prevent/manage excess moisture  Outcome:  Progressing  Flowsheets (Taken 1/12/2025 2158)  Prevent/manage excess moisture:   Moisturize dry skin   Cleanse incontinence/protect with barrier cream  Goal: Prevent/minimize sheer/friction injuries  Outcome: Progressing  Goal: Promote/optimize nutrition  Outcome: Progressing  Goal: Promote skin healing  Outcome: Progressing     Problem: Pain  Goal: Takes deep breaths with improved pain control throughout the shift  Outcome: Progressing  Goal: Turns in bed with improved pain control throughout the shift  Outcome: Progressing  Goal: Walks with improved pain control throughout the shift  Outcome: Progressing  Goal: Performs ADL's with improved pain control throughout shift  Outcome: Progressing  Goal: Participates in PT with improved pain control throughout the shift  Outcome: Progressing  Goal: Free from opioid side effects throughout the shift  Outcome: Progressing  Goal: Free from acute confusion related to pain meds throughout the shift  Outcome: Progressing     Problem: Pain - Adult  Goal: Verbalizes/displays adequate comfort level or baseline comfort level  Outcome: Progressing     Problem: Safety - Adult  Goal: Free from fall injury  Outcome: Progressing     Problem: Discharge Planning  Goal: Discharge to home or other facility with appropriate resources  Outcome: Progressing     Problem: Chronic Conditions and Co-morbidities  Goal: Patient's chronic conditions and co-morbidity symptoms are monitored and maintained or improved  Outcome: Progressing

## 2025-01-13 NOTE — PROGRESS NOTES
Simi Sandoval is a 89 y.o. female on day 5 of admission presenting with DLBCL (diffuse large B cell lymphoma).    Subjective   Afebrile, VSS.  Patient   appears to be a bit lethargic this morning.  She states she felt  SOB  this morning  but wasn't in any distress , she is still wearing O2.  She denies any chest pain or abdominal pain.    When more awake she can answer questions more appropriately.   Her son was in at bedside and he was concerned that she wasn't eating well and if someone was helping her to eat.    Patient drank  water well no coughing noted  and nursing has been giving pills   crushed in applesauce.          Objective     Physical Exam  Constitutional:       Appearance: She is ill-appearing.   HENT:      Head: Normocephalic.      Nose: Nose normal.      Mouth/Throat:      Mouth: Mucous membranes are dry.      Pharynx: No oropharyngeal exudate or posterior oropharyngeal erythema.   Eyes:      General: No scleral icterus.     Extraocular Movements: Extraocular movements intact.      Pupils: Pupils are equal, round, and reactive to light.   Cardiovascular:      Rate and Rhythm: Normal rate and regular rhythm.      Pulses: Normal pulses.      Heart sounds: Normal heart sounds. No murmur heard.     No friction rub. No gallop.   Pulmonary:      Effort: Pulmonary effort is normal.      Breath sounds: Decreased air movement present.   Abdominal:      General: Bowel sounds are increased. There is no distension.      Palpations: Abdomen is soft.      Tenderness: There is abdominal tenderness in the right upper quadrant and right lower quadrant.   Musculoskeletal:         General: Tenderness (LLE) present.      Cervical back: Normal range of motion and neck supple.      Right lower leg: Edema present.      Left lower leg: Edema present.   Skin:     General: Skin is warm and dry.   Neurological:      General: No focal deficit present.      Mental Status: She is alert and oriented to person, place, and  time. Mental status is at baseline.   Psychiatric:         Mood and Affect: Mood normal.         Behavior: Behavior normal.         Thought Content: Thought content normal.         Judgment: Judgment normal.         Last Recorded Vitals  Vitals:    01/13/25 1259   BP: 117/72   Pulse: 59   Resp: 18   Temp: 36.2 °C (97.2 °F)   SpO2: 95%     Results for orders placed or performed during the hospital encounter of 01/08/25 (from the past 24 hours)   Renal function panel   Result Value Ref Range    Glucose 166 (H) 74 - 99 mg/dL    Sodium 125 (L) 136 - 145 mmol/L    Potassium 4.8 3.5 - 5.3 mmol/L    Chloride 90 (L) 98 - 107 mmol/L    Bicarbonate 26 21 - 32 mmol/L    Anion Gap 14 10 - 20 mmol/L    Urea Nitrogen 33 (H) 6 - 23 mg/dL    Creatinine 1.12 (H) 0.50 - 1.05 mg/dL    eGFR 48 (L) >60 mL/min/1.73m*2    Calcium 10.0 8.6 - 10.6 mg/dL    Phosphorus 3.5 2.5 - 4.9 mg/dL    Albumin 2.6 (L) 3.4 - 5.0 g/dL   Magnesium   Result Value Ref Range    Magnesium 1.60 1.60 - 2.40 mg/dL   CBC and Auto Differential   Result Value Ref Range    WBC 21.4 (H) 4.4 - 11.3 x10*3/uL    nRBC 0.1 (H) 0.0 - 0.0 /100 WBCs    RBC 2.59 (L) 4.00 - 5.20 x10*6/uL    Hemoglobin 9.2 (L) 12.0 - 16.0 g/dL    Hematocrit 24.6 (L) 36.0 - 46.0 %    MCV 95 80 - 100 fL    MCH 35.5 (H) 26.0 - 34.0 pg    MCHC 37.4 (H) 32.0 - 36.0 g/dL    RDW 15.7 (H) 11.5 - 14.5 %    Platelets 452 (H) 150 - 450 x10*3/uL    Immature Granulocytes %, Automated 1.3 (H) 0.0 - 0.9 %    Immature Granulocytes Absolute, Automated 0.28 0.00 - 0.50 x10*3/uL   Comprehensive Metabolic Panel   Result Value Ref Range    Glucose 141 (H) 74 - 99 mg/dL    Sodium 125 (L) 136 - 145 mmol/L    Potassium 5.0 3.5 - 5.3 mmol/L    Chloride 90 (L) 98 - 107 mmol/L    Bicarbonate 27 21 - 32 mmol/L    Anion Gap 13 10 - 20 mmol/L    Urea Nitrogen 38 (H) 6 - 23 mg/dL    Creatinine 1.09 (H) 0.50 - 1.05 mg/dL    eGFR 49 (L) >60 mL/min/1.73m*2    Calcium 9.9 8.6 - 10.6 mg/dL    Albumin 2.7 (L) 3.4 - 5.0 g/dL     Alkaline Phosphatase 81 33 - 136 U/L    Total Protein 4.6 (L) 6.4 - 8.2 g/dL    AST 18 9 - 39 U/L    Bilirubin, Total 0.4 0.0 - 1.2 mg/dL    ALT 22 7 - 45 U/L   Magnesium   Result Value Ref Range    Magnesium 2.33 1.60 - 2.40 mg/dL   Phosphorus   Result Value Ref Range    Phosphorus 3.9 2.5 - 4.9 mg/dL   C-Reactive Protein   Result Value Ref Range    C-Reactive Protein 0.30 <1.00 mg/dL   Ferritin   Result Value Ref Range    Ferritin 382 (H) 8 - 150 ng/mL   Fibrinogen   Result Value Ref Range    Fibrinogen 263 200 - 400 mg/dL   Lactate Dehydrogenase   Result Value Ref Range     (H) 84 - 246 U/L   Uric Acid   Result Value Ref Range    Uric Acid 6.2 2.3 - 6.7 mg/dL   Bilirubin, Direct   Result Value Ref Range    Bilirubin, Direct 0.1 0.0 - 0.3 mg/dL   Manual Differential   Result Value Ref Range    Neutrophils %, Manual 94.8 40.0 - 80.0 %    Lymphocytes %, Manual 0.9 13.0 - 44.0 %    Monocytes %, Manual 4.3 2.0 - 10.0 %    Eosinophils %, Manual 0.0 0.0 - 6.0 %    Basophils %, Manual 0.0 0.0 - 2.0 %    Seg Neutrophils Absolute, Manual 20.29 (H) 1.60 - 5.00 x10*3/uL    Lymphocytes Absolute, Manual 0.19 (L) 0.80 - 3.00 x10*3/uL    Monocytes Absolute, Manual 0.92 (H) 0.05 - 0.80 x10*3/uL    Eosinophils Absolute, Manual 0.00 0.00 - 0.40 x10*3/uL    Basophils Absolute, Manual 0.00 0.00 - 0.10 x10*3/uL    Total Cells Counted 115     RBC Morphology See Below     Ovalocytes Few      *Note: Due to a large number of results and/or encounters for the requested time period, some results have not been displayed. A complete set of results can be found in Results Review.      Intake/Output last 3 Shifts:  I/O last 3 completed shifts:  In: 250 (3.9 mL/kg) [IV Piggyback:250]  Out: 150 (2.4 mL/kg) [Urine:150 (0.1 mL/kg/hr)]  Weight: 63.4 kg     Scheduled medications  acyclovir, 400 mg, oral, q12h VERONICA  allopurinol, 300 mg, oral, Daily  amiodarone, 200 mg, oral, Daily  amLODIPine, 5 mg, oral, Daily  apixaban, 5 mg, oral,  BID  busPIRone, 7.5 mg, oral, TID  DULoxetine, 20 mg, oral, Daily  fluconazole, 400 mg, oral, Daily  lidocaine, 1 patch, transdermal, Daily  magnesium oxide, 400 mg, oral, q12h VERONICA  methIMAzole, 2.5 mg, oral, Daily  metoprolol succinate XL, 100 mg, oral, Daily  pantoprazole, 40 mg, oral, Daily before breakfast  rOPINIRole, 0.5 mg, oral, Nightly  sodium chloride, 1,000 mg, oral, BID  vancomycin, 125 mg, oral, 4x daily      Continuous medications     PRN medications  PRN medications: albuterol, alteplase, dextrose, diphenhydrAMINE, EPINEPHrine HCl, famotidine, ipratropium-albuteroL, melatonin, methocarbamol, methylPREDNISolone sodium succinate (PF), moisturizing mouth, ondansetron, oxyCODONE, prochlorperazine, prochlorperazine, sodium chloride       Assessment & Plan  DLBCL (diffuse large B cell lymphoma)    Simi Sandoval is an 89-year-old F with PMH DLBCL of left breast, A-fib (on Eliquis), pulmonary hypertension, Hysterectomy, Thyroid disease,  CHF, NSTEMI, s/p uretral stent (12/13) who comes in for evaluation of left flank pain, left  sided abdominal pain  and diarrhea at OSH on 12/27. Colonoscopy completed 12/30 and was found to have Cdiff colitis  with pancolitis. Pt was started on Vanco for Pancolitis and was treated with zosyn for aspiration pneumonia seen on CT A/P S/P colonoscopy. CT imaging found multiple pulmonary nodules bilaterally, largest dependently on the left measuring 2.6 cm. Biopsy of left lung nodule done 12/11/2024 showed diffuse aggressive large B-cell lymphoma, recurrent by history. Heme/Onc saw the pt at OSH and recommended to transfer to [facility] for further work up, evaluation and treatment of disease recurrence.    Active Issues 01/13/25  #DLBCL; day 4 of epcoritamab, cont allopurinol, TLS/CRS labs appear grossly stable   #Cdiff colitis; continues on oral vanco until 1/13 -- stools seem to be slowing down   #hypercalcemia of malignancy; s/p zometax1, calcitonin x4 doses. Ca+ improved 1/12  to 10.5  #Cancer-related pain: will modestly increase PRN oxy dose. Per Supportive Onc, will see her on Monday  #Anxiety: Cont current meds. Per Supportive Onc, will see her on Monday  #Pulmonary congestion; CXR 1/12 showing pulmonary congestion, small left pleural effusion. Will give 40mg IV lasix today  #Hyponatremia; likely 2/2 FVO, restart home NaCl tabs of 1g BID, restrict PO free water to 1L daily, sent off urine studies to monitor .     ONC   #Diffuse large B-cell lymphoma   - Last treated with Tafasitamab 5/16/2023   - New lung nodules favoring recurrence of lymphoma back in December.    - last chemo in 2023   - Pathology from lung nodule biopsy 12/11/2024 confirmed disease recurrence .  - CT Flank 12/27/2024 shows disease progression in pulmonary nodules and new cortical lytic lesions in L4-5.   - S/p Dexamethasone 4 mg BID at OSH by Heme/Onc and ordered MRI per Oncology at OSH on admission to rule out cord compression.  - MRI 1/9: negative for cord compression, soft tissue masses involving the paraspinal musculature, left greater than right with involvement of the left lateral epidural space at the L5 level resulting in moderate spinal canal stenosis  - Continue C1 epcoritamab (D1: 1/10/25). Today is Day 4. daily CRS labs, BID TLS labs    HEME  #Leukocytosis  -WBC 25.4 on admit  -most likely secondary to colitis  #Anemia  -Hgb 10 on admit  -Transfuse if Hgb<7  -Monitor CBC daily    ID  # C. diff colitis with pan colitis  (Resolving)  - CT A/P shows mild pancolitis on distal rectosigmoid and pseudomembranes colitis on Colonoscopy (12/30)   - S/p PO Vanco at OSH and continue po vancomycin to 125mg qid on admit with last doses 1/13/25.   #Prophy  - ACV, fluconazole    PULM  #Acute hypoxic Resp failure    #Aspiration Pneumonia  - Doesn't wear O2 at baseline, wean as tolerated  - Patient likely with aspiration pneumonia status post colonoscopy on 12/30/2024.  She was supine laying on her left side which is  where the infiltrate was shown on chest x-ray.  Concern for aspiration during procedure  - S/p 7 day course of Zosyn at OSH  - CXR on admission shows with mild-to-moderate degree of pulmonary interstitial edema. Small left and right pleural effusion  - 40mg IV lasix (1/9) for pulm edema  - S/p bipap for acute hypoxic respiratory failure at OSH  - S/p intubation in 11/2024 for pneumonia and CHF exacerbation  - Repeat CXR (1/11) with pulmonary congestion, small left pleural effusion  - Lasix 40mg IV x1 (1/12)     FEN/GI  Admit wt: 64 kg, current wt: 63.4 kg  F: PRN  E: PRN  N: Low path, 1L fluid restriction  #GURINDER (resolved)   - sCr 1.01 on admit  - Monitor CMP daily  # Diarrhea  :: 2/2 c.diff colitis  - Management as above  # Hypercalcemia, improving  - sCa 12.6 on admit, 1/10: 12.4, 1/12: 10.5  - Increase mIVF to 100ml/hr, stopped fluids 1/10 with consideration of hx of CHF  - Zometa 4mg x1 (1/9)  - Calcitonin BID x2 doses (1/10) -- repeat 2 doses on 1/1  #Hyponatremia  :: 2/2 fluid volume overload  - S/p IVF NS on admit x48hrs  - Initiate fluid restriction of 1L (1/12)  - Restart home NaCl tabs 1g BID  - Monitor daily  - Sent Urine  electrolytes , spot Na ,studies pending (1/13)      URO  # Left hydronephrosis, Urinary Retention s/p Left ureteral stent placed on 12/13/24   - Seen by Urology at OSH  - S/p maki removed on 1/5   - Voiding fine on admit     CARDS   - LVEF 57%. Vegetation noted on prior echo.  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 She denies any increase or change in her neuropathy symptoms. Per Neurology at OSH  - Continue supportive measures    - No further neurological testing needed at this point      MSK   # Lumbar Spinal stenosis    - Started on Oxy 5 mg  PRN and cont with Robaxin 500 mg TID PRN -- will cautiously increase oxy as of 1/11 and consult Supportive Onc (they will see Monday)      PSYCH   # Anxiety   - C/w Buspar 7.5 mg TID and Cymbalta  -consult Supportive Onc (they will see Monday)      ENDO   # Hyperthyroidism   - C/w methimazole      Dispo  - Full code  - R Med port  - Primary Oncologist Dr Mac    Patient seen, examined, and discussed with Dr. Brannon Sarabia, APRN-CNP

## 2025-01-13 NOTE — PROGRESS NOTES
Physical Therapy                 Therapy Communication Note    Patient Name: Simi Sandoval  MRN: [id number]  Department:   Room: [facility]3004-A  Today's Date: 1/13/2025     Discipline: Physical Therapy    PT Missed Visit: Yes     Missed Visit Reason: Missed Visit Reason: Patient in a medical procedure (Pt is off the floor at vascular. Will re-attempt as able and appropriate)    Missed Time: Attempt

## 2025-01-14 ENCOUNTER — APPOINTMENT (OUTPATIENT)
Dept: RADIOLOGY | Facility: HOSPITAL | Age: 89
DRG: 840 | End: 2025-01-14
Payer: MEDICARE

## 2025-01-14 ENCOUNTER — APPOINTMENT (OUTPATIENT)
Dept: CARDIOLOGY | Facility: HOSPITAL | Age: 89
DRG: 840 | End: 2025-01-14
Payer: MEDICARE

## 2025-01-14 LAB
ABO GROUP (TYPE) IN BLOOD: NORMAL
ALBUMIN SERPL BCP-MCNC: 2.7 G/DL (ref 3.4–5)
ALBUMIN SERPL BCP-MCNC: 2.7 G/DL (ref 3.4–5)
ALP SERPL-CCNC: 81 U/L (ref 33–136)
ALT SERPL W P-5'-P-CCNC: 20 U/L (ref 7–45)
ANION GAP SERPL CALC-SCNC: 14 MMOL/L (ref 10–20)
ANION GAP SERPL CALC-SCNC: 15 MMOL/L (ref 10–20)
ANTIBODY SCREEN: NORMAL
AST SERPL W P-5'-P-CCNC: 20 U/L (ref 9–39)
BASOPHILS # BLD AUTO: 0 X10*3/UL (ref 0–0.1)
BASOPHILS NFR BLD AUTO: 0 %
BILIRUB DIRECT SERPL-MCNC: 0.1 MG/DL (ref 0–0.3)
BILIRUB SERPL-MCNC: 0.4 MG/DL (ref 0–1.2)
BNP SERPL-MCNC: 1112 PG/ML (ref 0–99)
BUN SERPL-MCNC: 45 MG/DL (ref 6–23)
BUN SERPL-MCNC: 50 MG/DL (ref 6–23)
BURR CELLS BLD QL SMEAR: NORMAL
CALCIUM SERPL-MCNC: 9.3 MG/DL (ref 8.6–10.6)
CALCIUM SERPL-MCNC: 9.6 MG/DL (ref 8.6–10.6)
CHLORIDE SERPL-SCNC: 91 MMOL/L (ref 98–107)
CHLORIDE SERPL-SCNC: 92 MMOL/L (ref 98–107)
CO2 SERPL-SCNC: 26 MMOL/L (ref 21–32)
CO2 SERPL-SCNC: 27 MMOL/L (ref 21–32)
CREAT SERPL-MCNC: 1.38 MG/DL (ref 0.5–1.05)
CREAT SERPL-MCNC: 1.39 MG/DL (ref 0.5–1.05)
CRP SERPL-MCNC: 0.43 MG/DL
DACRYOCYTES BLD QL SMEAR: NORMAL
EGFRCR SERPLBLD CKD-EPI 2021: 37 ML/MIN/1.73M*2
EGFRCR SERPLBLD CKD-EPI 2021: 37 ML/MIN/1.73M*2
EOSINOPHIL # BLD AUTO: 0.01 X10*3/UL (ref 0–0.4)
EOSINOPHIL NFR BLD AUTO: 0.2 %
ERYTHROCYTE [DISTWIDTH] IN BLOOD BY AUTOMATED COUNT: 14.7 % (ref 11.5–14.5)
FERRITIN SERPL-MCNC: 403 NG/ML (ref 8–150)
FIBRINOGEN PPP-MCNC: 255 MG/DL (ref 200–400)
GLUCOSE SERPL-MCNC: 125 MG/DL (ref 74–99)
GLUCOSE SERPL-MCNC: 125 MG/DL (ref 74–99)
HCT VFR BLD AUTO: 24.6 % (ref 36–46)
HGB BLD-MCNC: 9 G/DL (ref 12–16)
HOLD SPECIMEN: NORMAL
IMM GRANULOCYTES # BLD AUTO: 0.07 X10*3/UL (ref 0–0.5)
IMM GRANULOCYTES NFR BLD AUTO: 1.1 % (ref 0–0.9)
LACTATE SERPL-SCNC: 1.4 MMOL/L (ref 0.4–2)
LDH SERPL L TO P-CCNC: 491 U/L (ref 84–246)
LYMPHOCYTES # BLD AUTO: 0.03 X10*3/UL (ref 0.8–3)
LYMPHOCYTES NFR BLD AUTO: 0.5 %
MAGNESIUM SERPL-MCNC: 2.47 MG/DL (ref 1.6–2.4)
MAGNESIUM SERPL-MCNC: 2.77 MG/DL (ref 1.6–2.4)
MCH RBC QN AUTO: 31.6 PG (ref 26–34)
MCHC RBC AUTO-ENTMCNC: 36.6 G/DL (ref 32–36)
MCV RBC AUTO: 86 FL (ref 80–100)
MONOCYTES # BLD AUTO: 0.17 X10*3/UL (ref 0.05–0.8)
MONOCYTES NFR BLD AUTO: 2.7 %
NEUTROPHILS # BLD AUTO: 6.05 X10*3/UL (ref 1.6–5.5)
NEUTROPHILS NFR BLD AUTO: 95.5 %
NRBC BLD-RTO: 0 /100 WBCS (ref 0–0)
PHOSPHATE SERPL-MCNC: 3.2 MG/DL (ref 2.5–4.9)
PHOSPHATE SERPL-MCNC: 3.5 MG/DL (ref 2.5–4.9)
PLATELET # BLD AUTO: 393 X10*3/UL (ref 150–450)
POTASSIUM SERPL-SCNC: 4.5 MMOL/L (ref 3.5–5.3)
POTASSIUM SERPL-SCNC: 4.9 MMOL/L (ref 3.5–5.3)
PROT SERPL-MCNC: 4.5 G/DL (ref 6.4–8.2)
PTH RELATED PROT SERPL-SCNC: 2.5 PMOL/L
RBC # BLD AUTO: 2.85 X10*6/UL (ref 4–5.2)
RBC MORPH BLD: NORMAL
RH FACTOR (ANTIGEN D): NORMAL
SODIUM SERPL-SCNC: 127 MMOL/L (ref 136–145)
SODIUM SERPL-SCNC: 128 MMOL/L (ref 136–145)
URATE SERPL-MCNC: 6.3 MG/DL (ref 2.3–6.7)
WBC # BLD AUTO: 19 X10*3/UL (ref 4.4–11.3)

## 2025-01-14 PROCEDURE — 82248 BILIRUBIN DIRECT: CPT

## 2025-01-14 PROCEDURE — 85025 COMPLETE CBC W/AUTO DIFF WBC: CPT

## 2025-01-14 PROCEDURE — 83880 ASSAY OF NATRIURETIC PEPTIDE: CPT | Performed by: NURSE PRACTITIONER

## 2025-01-14 PROCEDURE — 97162 PT EVAL MOD COMPLEX 30 MIN: CPT | Mod: GP

## 2025-01-14 PROCEDURE — 70551 MRI BRAIN STEM W/O DYE: CPT

## 2025-01-14 PROCEDURE — 84100 ASSAY OF PHOSPHORUS: CPT

## 2025-01-14 PROCEDURE — 86140 C-REACTIVE PROTEIN: CPT

## 2025-01-14 PROCEDURE — 99233 SBSQ HOSP IP/OBS HIGH 50: CPT | Performed by: STUDENT IN AN ORGANIZED HEALTH CARE EDUCATION/TRAINING PROGRAM

## 2025-01-14 PROCEDURE — 97166 OT EVAL MOD COMPLEX 45 MIN: CPT | Mod: GO | Performed by: OCCUPATIONAL THERAPIST

## 2025-01-14 PROCEDURE — 86901 BLOOD TYPING SEROLOGIC RH(D): CPT

## 2025-01-14 PROCEDURE — 0932T N-INVS DET HRT FAIL AUG ECHO: CPT

## 2025-01-14 PROCEDURE — 83605 ASSAY OF LACTIC ACID: CPT | Performed by: NURSE PRACTITIONER

## 2025-01-14 PROCEDURE — 82565 ASSAY OF CREATININE: CPT

## 2025-01-14 PROCEDURE — 1170000001 HC PRIVATE ONCOLOGY ROOM DAILY

## 2025-01-14 PROCEDURE — 2500000002 HC RX 250 W HCPCS SELF ADMINISTERED DRUGS (ALT 637 FOR MEDICARE OP, ALT 636 FOR OP/ED)

## 2025-01-14 PROCEDURE — 2500000004 HC RX 250 GENERAL PHARMACY W/ HCPCS (ALT 636 FOR OP/ED): Performed by: NURSE PRACTITIONER

## 2025-01-14 PROCEDURE — 2500000001 HC RX 250 WO HCPCS SELF ADMINISTERED DRUGS (ALT 637 FOR MEDICARE OP)

## 2025-01-14 PROCEDURE — 83735 ASSAY OF MAGNESIUM: CPT

## 2025-01-14 PROCEDURE — 80069 RENAL FUNCTION PANEL: CPT | Mod: CCI

## 2025-01-14 PROCEDURE — 70551 MRI BRAIN STEM W/O DYE: CPT | Performed by: RADIOLOGY

## 2025-01-14 PROCEDURE — 85384 FIBRINOGEN ACTIVITY: CPT

## 2025-01-14 PROCEDURE — 82728 ASSAY OF FERRITIN: CPT

## 2025-01-14 PROCEDURE — 2500000001 HC RX 250 WO HCPCS SELF ADMINISTERED DRUGS (ALT 637 FOR MEDICARE OP): Performed by: NURSE PRACTITIONER

## 2025-01-14 PROCEDURE — 2500000004 HC RX 250 GENERAL PHARMACY W/ HCPCS (ALT 636 FOR OP/ED)

## 2025-01-14 PROCEDURE — 84550 ASSAY OF BLOOD/URIC ACID: CPT

## 2025-01-14 PROCEDURE — XXE2X19 MEASUREMENT OF CARDIAC OUTPUT, COMPUTER-AIDED ASSESSMENT, NEW TECHNOLOGY GROUP 9: ICD-10-PCS | Performed by: NURSE PRACTITIONER

## 2025-01-14 PROCEDURE — 83615 LACTATE (LD) (LDH) ENZYME: CPT

## 2025-01-14 RX ORDER — FUROSEMIDE 10 MG/ML
40 INJECTION INTRAMUSCULAR; INTRAVENOUS EVERY 12 HOURS
Status: DISCONTINUED | OUTPATIENT
Start: 2025-01-14 | End: 2025-01-19

## 2025-01-14 RX ADMIN — MAGNESIUM OXIDE TAB 400 MG (241.3 MG ELEMENTAL MG) 400 MG: 400 (241.3 MG) TAB at 17:26

## 2025-01-14 RX ADMIN — METHIMAZOLE 2.5 MG: 5 TABLET ORAL at 11:04

## 2025-01-14 RX ADMIN — ALLOPURINOL 300 MG: 300 TABLET ORAL at 10:39

## 2025-01-14 RX ADMIN — BUSPIRONE HYDROCHLORIDE 7.5 MG: 15 TABLET ORAL at 20:21

## 2025-01-14 RX ADMIN — APIXABAN 5 MG: 5 TABLET, FILM COATED ORAL at 20:21

## 2025-01-14 RX ADMIN — ACYCLOVIR 400 MG: 400 TABLET ORAL at 10:39

## 2025-01-14 RX ADMIN — FLUCONAZOLE 400 MG: 200 TABLET ORAL at 10:39

## 2025-01-14 RX ADMIN — MAGNESIUM OXIDE TAB 400 MG (241.3 MG ELEMENTAL MG) 400 MG: 400 (241.3 MG) TAB at 06:30

## 2025-01-14 RX ADMIN — OXYCODONE HYDROCHLORIDE 10 MG: 5 TABLET ORAL at 23:47

## 2025-01-14 RX ADMIN — SODIUM CHLORIDE 1 G: 1 TABLET ORAL at 10:41

## 2025-01-14 RX ADMIN — ACYCLOVIR 400 MG: 400 TABLET ORAL at 20:21

## 2025-01-14 RX ADMIN — AMIODARONE HYDROCHLORIDE 200 MG: 200 TABLET ORAL at 10:41

## 2025-01-14 RX ADMIN — APIXABAN 5 MG: 5 TABLET, FILM COATED ORAL at 10:39

## 2025-01-14 RX ADMIN — BUSPIRONE HYDROCHLORIDE 7.5 MG: 15 TABLET ORAL at 17:25

## 2025-01-14 RX ADMIN — AMLODIPINE BESYLATE 5 MG: 5 TABLET ORAL at 10:39

## 2025-01-14 RX ADMIN — ROPINIROLE 0.5 MG: 0.5 TABLET, FILM COATED ORAL at 20:21

## 2025-01-14 RX ADMIN — METOPROLOL SUCCINATE 100 MG: 50 TABLET, EXTENDED RELEASE ORAL at 10:39

## 2025-01-14 RX ADMIN — FUROSEMIDE 40 MG: 10 INJECTION, SOLUTION INTRAVENOUS at 17:25

## 2025-01-14 RX ADMIN — BUSPIRONE HYDROCHLORIDE 7.5 MG: 15 TABLET ORAL at 10:41

## 2025-01-14 RX ADMIN — PANTOPRAZOLE SODIUM 40 MG: 40 TABLET, DELAYED RELEASE ORAL at 06:29

## 2025-01-14 ASSESSMENT — PAIN SCALES - WONG BAKER: WONGBAKER_NUMERICALRESPONSE: HURTS WHOLE LOT

## 2025-01-14 ASSESSMENT — PAIN - FUNCTIONAL ASSESSMENT
PAIN_FUNCTIONAL_ASSESSMENT: 0-10

## 2025-01-14 ASSESSMENT — ACTIVITIES OF DAILY LIVING (ADL)
ADL_ASSISTANCE: INDEPENDENT
BATHING_ASSISTANCE: TOTAL
ADL_ASSISTANCE: INDEPENDENT

## 2025-01-14 ASSESSMENT — COGNITIVE AND FUNCTIONAL STATUS - GENERAL
PERSONAL GROOMING: TOTAL
MOVING FROM LYING ON BACK TO SITTING ON SIDE OF FLAT BED WITH BEDRAILS: TOTAL
TURNING FROM BACK TO SIDE WHILE IN FLAT BAD: TOTAL
DRESSING REGULAR LOWER BODY CLOTHING: TOTAL
CLIMB 3 TO 5 STEPS WITH RAILING: TOTAL
TOILETING: TOTAL
DRESSING REGULAR UPPER BODY CLOTHING: TOTAL
MOBILITY SCORE: 6
WALKING IN HOSPITAL ROOM: TOTAL
HELP NEEDED FOR BATHING: TOTAL
STANDING UP FROM CHAIR USING ARMS: TOTAL
EATING MEALS: A LOT
DAILY ACTIVITIY SCORE: 7
MOVING TO AND FROM BED TO CHAIR: TOTAL

## 2025-01-14 ASSESSMENT — PAIN SCALES - GENERAL: PAINLEVEL_OUTOF10: 7

## 2025-01-14 ASSESSMENT — PAIN DESCRIPTION - LOCATION: LOCATION: BACK

## 2025-01-14 NOTE — PROGRESS NOTES
Simi Sandoval is a 89 y.o. female on day 6 of admission presenting with DLBCL (diffuse large B cell lymphoma).    Subjective   Patient last evening  was found to be hypoxic  requiring  venti mask  and lasix.    This am she is currently on 6L by mask.   She states she had some mild SOB.   She is easy arousable  but initially seems confused but with continued conversation  her mental status improves ,  ICE score this morning is 7/10.     Extremities are edematous.    Patient denies any nausea  or abdominal pain at this time.         Objective     Physical Exam  Constitutional:       Appearance: She is ill-appearing.   HENT:      Head: Normocephalic.      Nose: Nose normal.      Mouth/Throat:      Mouth: Mucous membranes are dry.      Pharynx: No oropharyngeal exudate or posterior oropharyngeal erythema.   Eyes:      General: No scleral icterus.     Extraocular Movements: Extraocular movements intact.      Pupils: Pupils are equal, round, and reactive to light.   Cardiovascular:      Rate and Rhythm: Normal rate and regular rhythm.      Pulses: Normal pulses.      Heart sounds: Normal heart sounds. No murmur heard.     No friction rub. No gallop.   Pulmonary:      Effort: Pulmonary effort is normal.      Breath sounds: Decreased air movement present.   Abdominal:      General: Bowel sounds are increased. There is no distension.      Palpations: Abdomen is soft.      Tenderness: There is abdominal tenderness in the right upper quadrant and right lower quadrant.   Musculoskeletal:         General: Tenderness (LLE) present.      Cervical back: Normal range of motion and neck supple.      Right lower leg: Edema present.      Left lower leg: Edema present.   Skin:     General: Skin is warm and dry.      Capillary Refill: Capillary refill takes 2 to 3 seconds.   Neurological:      Mental Status: She is alert. She is disoriented.      Comments: AO x 1-2 , occasional confusion    Psychiatric:         Mood and Affect:  Mood normal.         Behavior: Behavior normal.         Thought Content: Thought content normal.         Judgment: Judgment normal.         Last Recorded Vitals  Vitals:    01/14/25 1649   BP: 105/57   Pulse: 63   Resp: 20   Temp: 36.7 °C (98.1 °F)   SpO2: 92%     Results for orders placed or performed during the hospital encounter of 01/08/25 (from the past 24 hours)   Renal function panel   Result Value Ref Range    Glucose 121 (H) 74 - 99 mg/dL    Sodium 125 (L) 136 - 145 mmol/L    Potassium 4.9 3.5 - 5.3 mmol/L    Chloride 90 (L) 98 - 107 mmol/L    Bicarbonate 27 21 - 32 mmol/L    Anion Gap 13 10 - 20 mmol/L    Urea Nitrogen 42 (H) 6 - 23 mg/dL    Creatinine 1.29 (H) 0.50 - 1.05 mg/dL    eGFR 40 (L) >60 mL/min/1.73m*2    Calcium 9.6 8.6 - 10.6 mg/dL    Phosphorus 3.1 2.5 - 4.9 mg/dL    Albumin 2.6 (L) 3.4 - 5.0 g/dL   Magnesium   Result Value Ref Range    Magnesium 2.19 1.60 - 2.40 mg/dL   Urinalysis with Reflex Culture and Microscopic   Result Value Ref Range    Color, Urine Light-Orange (N) Light-Yellow, Yellow, Dark-Yellow    Appearance, Urine Turbid (N) Clear    Specific Gravity, Urine 1.022 1.005 - 1.035    pH, Urine 5.5 5.0, 5.5, 6.0, 6.5, 7.0, 7.5, 8.0    Protein, Urine 50 (1+) (A) NEGATIVE, 10 (TRACE), 20 (TRACE) mg/dL    Glucose, Urine Normal Normal mg/dL    Blood, Urine OVER (3+) (A) NEGATIVE    Ketones, Urine NEGATIVE NEGATIVE mg/dL    Bilirubin, Urine NEGATIVE NEGATIVE    Urobilinogen, Urine Normal Normal mg/dL    Nitrite, Urine NEGATIVE NEGATIVE    Leukocyte Esterase, Urine 25 You/uL (A) NEGATIVE   Extra Urine Gray Tube   Result Value Ref Range    Extra Tube Hold for add-ons.    Microscopic Only, Urine   Result Value Ref Range    WBC, Urine 1-5 1-5, NONE /HPF    RBC, Urine >20 (A) NONE, 1-2, 3-5 /HPF    Squamous Epithelial Cells, Urine 1-9 (SPARSE) Reference range not established. /HPF   Urine electrolytes   Result Value Ref Range    Sodium, Urine Random 14 mmol/L    Sodium/Creatinine Ratio 16 Not  established. mmol/g Creat    Potassium, Urine Random 58 mmol/L    Potassium/Creatinine Ratio 64 Not established mmol/g Creat    Chloride, Urine Random <15 mmol/L    Chloride/Creatinine Ratio      Creatinine, Urine Random 90.0 20.0 - 320.0 mg/dL   Osmolality, urine   Result Value Ref Range    Osmolality, Urine Random 589 200 - 1,200 mOsm/kg   Specific Gravity, Urine   Result Value Ref Range    Specific Gravity, Urine 1.022 1.005 - 1.035   Sodium, Urine Random   Result Value Ref Range    Sodium, Urine Random 14 mmol/L    Creatinine, Urine Random 90.0 20.0 - 320.0 mg/dL    Sodium/Creatinine Ratio 16 Not established. mmol/g Creat   BLOOD GAS ARTERIAL FULL PANEL   Result Value Ref Range    POCT pH, Arterial 7.47 (H) 7.38 - 7.42 pH    POCT pCO2, Arterial 35 (L) 38 - 42 mm Hg    POCT pO2, Arterial 72 (L) 85 - 95 mm Hg    POCT SO2, Arterial 96 94 - 100 %    POCT Oxy Hemoglobin, Arterial 93.4 (L) 94.0 - 98.0 %    POCT Hematocrit Calculated, Arterial 29.0 (L) 36.0 - 46.0 %    POCT Sodium, Arterial 121 (L) 136 - 145 mmol/L    POCT Potassium, Arterial 5.1 3.5 - 5.3 mmol/L    POCT Chloride, Arterial 94 (L) 98 - 107 mmol/L    POCT Ionized Calcium, Arterial 1.41 (H) 1.10 - 1.33 mmol/L    POCT Glucose, Arterial 140 (H) 74 - 99 mg/dL    POCT Lactate, Arterial 1.7 0.4 - 2.0 mmol/L    POCT Base Excess, Arterial 1.9 -2.0 - 3.0 mmol/L    POCT HCO3 Calculated, Arterial 25.5 22.0 - 26.0 mmol/L    POCT Hemoglobin, Arterial 9.8 (L) 12.0 - 16.0 g/dL    POCT Anion Gap, Arterial 7 (L) 10 - 25 mmo/L    Patient Temperature 37.0 degrees Celsius    FiO2 50 %   CBC and Auto Differential   Result Value Ref Range    WBC 19.0 (H) 4.4 - 11.3 x10*3/uL    nRBC 0.0 0.0 - 0.0 /100 WBCs    RBC 2.85 (L) 4.00 - 5.20 x10*6/uL    Hemoglobin 9.0 (L) 12.0 - 16.0 g/dL    Hematocrit 24.6 (L) 36.0 - 46.0 %    MCV 86 80 - 100 fL    MCH 31.6 26.0 - 34.0 pg    MCHC 36.6 (H) 32.0 - 36.0 g/dL    RDW 14.7 (H) 11.5 - 14.5 %    Platelets 393 150 - 450 x10*3/uL     Neutrophils % 95.5 40.0 - 80.0 %    Immature Granulocytes %, Automated 1.1 (H) 0.0 - 0.9 %    Lymphocytes % 0.5 13.0 - 44.0 %    Monocytes % 2.7 2.0 - 10.0 %    Eosinophils % 0.2 0.0 - 6.0 %    Basophils % 0.0 0.0 - 2.0 %    Neutrophils Absolute 6.05 (H) 1.60 - 5.50 x10*3/uL    Immature Granulocytes Absolute, Automated 0.07 0.00 - 0.50 x10*3/uL    Lymphocytes Absolute 0.03 (L) 0.80 - 3.00 x10*3/uL    Monocytes Absolute 0.17 0.05 - 0.80 x10*3/uL    Eosinophils Absolute 0.01 0.00 - 0.40 x10*3/uL    Basophils Absolute 0.00 0.00 - 0.10 x10*3/uL   Comprehensive Metabolic Panel   Result Value Ref Range    Glucose 125 (H) 74 - 99 mg/dL    Sodium 127 (L) 136 - 145 mmol/L    Potassium 4.9 3.5 - 5.3 mmol/L    Chloride 91 (L) 98 - 107 mmol/L    Bicarbonate 26 21 - 32 mmol/L    Anion Gap 15 10 - 20 mmol/L    Urea Nitrogen 45 (H) 6 - 23 mg/dL    Creatinine 1.38 (H) 0.50 - 1.05 mg/dL    eGFR 37 (L) >60 mL/min/1.73m*2    Calcium 9.6 8.6 - 10.6 mg/dL    Albumin 2.7 (L) 3.4 - 5.0 g/dL    Alkaline Phosphatase 81 33 - 136 U/L    Total Protein 4.5 (L) 6.4 - 8.2 g/dL    AST 20 9 - 39 U/L    Bilirubin, Total 0.4 0.0 - 1.2 mg/dL    ALT 20 7 - 45 U/L   Magnesium   Result Value Ref Range    Magnesium 2.47 (H) 1.60 - 2.40 mg/dL   Phosphorus   Result Value Ref Range    Phosphorus 3.5 2.5 - 4.9 mg/dL   C-Reactive Protein   Result Value Ref Range    C-Reactive Protein 0.43 <1.00 mg/dL   Ferritin   Result Value Ref Range    Ferritin 403 (H) 8 - 150 ng/mL   Fibrinogen   Result Value Ref Range    Fibrinogen 255 200 - 400 mg/dL   Lactate Dehydrogenase   Result Value Ref Range     (H) 84 - 246 U/L   Uric Acid   Result Value Ref Range    Uric Acid 6.3 2.3 - 6.7 mg/dL   Bilirubin, Direct   Result Value Ref Range    Bilirubin, Direct 0.1 0.0 - 0.3 mg/dL   Morphology   Result Value Ref Range    RBC Morphology See Below     Teardrop Cells Few     Live Cells Few    Transthoracic Echo (TTE) Limited   Result Value Ref Range    AV pk sharon 2.04 m/s     AV mn grad 8 mmHg    LVOT diam 1.76 cm    MV E/A ratio 2.14     Tricuspid annular plane systolic excursion 2.3 cm    LV EF 70 %    RV free wall pk S' 14.00 cm/s    LVIDd 5.12 cm    RVSP 54.8 mmHg    Aortic Valve Area by Continuity of VTI 1.29 cm2    Aortic Valve Area by Continuity of Peak Velocity 1.25 cm2    AV pk grad 17 mmHg    LV A4C EF 66.1      *Note: Due to a large number of results and/or encounters for the requested time period, some results have not been displayed. A complete set of results can be found in Results Review.      Intake/Output last 3 Shifts:  I/O last 3 completed shifts:  In: 685 (10.8 mL/kg) [I.V.:185 (2.9 mL/kg); IV Piggyback:500]  Out: 1450 (22.9 mL/kg) [Urine:1450 (0.6 mL/kg/hr)]  Weight: 63.4 kg     Scheduled medications  acyclovir, 400 mg, oral, q12h VERONICA  allopurinol, 300 mg, oral, Daily  amiodarone, 200 mg, oral, Daily  amLODIPine, 5 mg, oral, Daily  apixaban, 5 mg, oral, BID  busPIRone, 7.5 mg, oral, TID  DULoxetine, 20 mg, oral, Daily  fluconazole, 400 mg, oral, Daily  furosemide, 40 mg, intravenous, q12h  lidocaine, 1 patch, transdermal, Daily  magnesium oxide, 400 mg, oral, q12h VERONICA  methIMAzole, 2.5 mg, oral, Daily  metoprolol succinate XL, 100 mg, oral, Daily  pantoprazole, 40 mg, oral, Daily before breakfast  rOPINIRole, 0.5 mg, oral, Nightly      Continuous medications     PRN medications  PRN medications: albuterol, alteplase, dextrose, diphenhydrAMINE, EPINEPHrine HCl, famotidine, ipratropium-albuteroL, melatonin, methocarbamol, methylPREDNISolone sodium succinate (PF), moisturizing mouth, ondansetron, oxyCODONE, prochlorperazine, prochlorperazine, sodium chloride       Assessment & Plan  DLBCL (diffuse large B cell lymphoma)    Simi Sandoval is an 89-year-old F with PMH DLBCL of left breast, A-fib (on Eliquis), pulmonary hypertension, Hysterectomy, Thyroid disease,  CHF, NSTEMI, s/p uretral stent (12/13) who comes in for evaluation of left flank pain, left  sided  abdominal pain  and diarrhea at OSH on 12/27. Colonoscopy completed 12/30 and was found to have Cdiff colitis  with pancolitis. Pt was started on Vanco for Pancolitis and was treated with zosyn for aspiration pneumonia seen on CT A/P S/P colonoscopy. CT imaging found multiple pulmonary nodules bilaterally, largest dependently on the left measuring 2.6 cm. Biopsy of left lung nodule done 12/11/2024 showed diffuse aggressive large B-cell lymphoma, recurrent by history. Heme/Onc saw the pt at OSH and recommended to transfer to [facility] for further work up, evaluation and treatment of disease recurrence.    Active Issues 01/14/25  # Fluid overload , consulted HF for diuresis , s/p  ECHO  done 1/14   #DLBCL; day 5 of epcoritamab, cont allopurinol, TLS/CRS labs appear grossly stable   #Cdiff colitis; continues on oral vanco until 1/13 -- stools seem to be slowing down   #hypercalcemia of malignancy; s/p zometax1, calcitonin x4 doses. Ca+ improved 1/12 to 10.5  #Cancer-related pain: will modestly increase PRN oxy dose. Per Supportive Onc, will see her on Monday  #Anxiety: Cont current meds. Per Supportive Onc, will see her on Monday  #Pulmonary congestion; CXR 1/12 showing pulmonary congestion, small left pleural effusion. Will give 40mg IV lasix today  #Hyponatremia; likely 2/2 FVO, restart home NaCl tabs of 1g BID, restrict PO free water to 1L daily, sent off urine studies to monitor .     ONC   #Diffuse large B-cell lymphoma   - Last treated with Tafasitamab 5/16/2023   - New lung nodules favoring recurrence of lymphoma back in December.    - last chemo in 2023   - Pathology from lung nodule biopsy 12/11/2024 confirmed disease recurrence .  - CT Flank 12/27/2024 shows disease progression in pulmonary nodules and new cortical lytic lesions in L4-5.   - S/p Dexamethasone 4 mg BID at OSH by Heme/Onc and ordered MRI per Oncology at OSH on admission to rule out cord compression.  - MRI 1/9: negative for cord compression, soft  tissue masses involving the paraspinal musculature, left greater than right with involvement of the left lateral epidural space at the L5 level resulting in moderate spinal canal stenosis  - Continue C1 epcoritamab (D1: 1/10/25). Today is Day 5. daily CRS labs, BID TLS labs    HEME  #Leukocytosis  -WBC 25.4 on admit  -most likely secondary to colitis vs. Dex  #Anemia  -Hgb 10 on admit  -Transfuse if Hgb<7  -Monitor CBC daily    ID  # C. diff colitis with pan colitis  (Resolving)  - CT A/P shows mild pancolitis on distal rectosigmoid and pseudomembranes colitis on Colonoscopy (12/30)   - S/p PO Vanco at OSH and continue po vancomycin to 125mg qid on admit with last doses 1/13/25.   #Prophy  - ACV, fluconazole    PULM  #Acute hypoxic Resp failure    #Aspiration Pneumonia  - Doesn't wear O2 at baseline, wean as tolerated  - Patient likely with aspiration pneumonia status post colonoscopy on 12/30/2024.  She was supine laying on her left side which is where the infiltrate was shown on chest x-ray.  Concern for aspiration during procedure  - S/p 7 day course of Zosyn at OSH  - CXR on admission shows with mild-to-moderate degree of pulmonary interstitial edema. Small left and right pleural effusion  - 40mg IV lasix (1/9) for pulm edema  - S/p bipap for acute hypoxic respiratory failure at OSH  - S/p intubation in 11/2024 for pneumonia and CHF exacerbation  - Repeat CXR (1/11) with pulmonary congestion, small left pleural effusion  - Lasix 40mg IV x1 (1/12)     FEN/GI  Admit wt: 64 kg, current wt: 63.4 kg  F: PRN  E: PRN  N: Low path, 1L fluid restriction  #GURINDER (resolved)   - sCr 1.01 on admit  - Monitor CMP daily  # Diarrhea  :: 2/2 c.diff colitis  - Management as above  # Hypercalcemia, improving  - sCa 12.6 on admit, 1/10: 12.4, 1/12: 10.5  - Increase mIVF to 100ml/hr, stopped fluids 1/10 with consideration of hx of CHF  - Zometa 4mg x1 (1/9)  - Calcitonin BID x2 doses (1/10) -- repeat 2 doses on  1/1  #Hyponatremia  :: 2/2 fluid volume overload  - S/p IVF NS on admit x48hrs  - Initiate fluid restriction of 1L (1/12)  - Restart home NaCl tabs 1g BID, Dc'd  on 1/14   due to possibly being hypervolemic  hyponatremia , may improve with diuresising per HF,  continue to monitor   - Monitor daily  - Sent Urine  electrolytes , spot Na ,studies pending (1/13)      URO  # Left hydronephrosis, Urinary Retention s/p Left ureteral stent placed on 12/13/24   - Seen by Urology at OSH  - S/p maki removed on 1/5   - Voiding fine on admit     CARDS   - LVEF 57%. Vegetation noted on prior echo.  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 She denies any increase or change in her neuropathy symptoms. Per Neurology at OSH  - Continue supportive measures    - No further neurological testing needed at this point      MSK   # Lumbar Spinal stenosis    - Started on Oxy 5 mg PRN and cont with Robaxin 500 mg TID PRN -- will cautiously increase oxy as of 1/11 and consult Supportive Onc (they will see Monday)      PSYCH   # Anxiety   - C/w Buspar 7.5 mg TID and Cymbalta  -consult Supportive Onc (they will see Monday)      ENDO   # Hyperthyroidism   - C/w methimazole      Dispo  - Full code  - R Med port  - Primary Oncologist Dr Mac    Patient seen, examined, and discussed with Dr. Brannon Sarabia, APRN-CNP

## 2025-01-14 NOTE — CONSULTS
"Advanced Heart Failure Initial Consultation Note   Consulting Team: onc  Reason for Consult: pulm edema, new echo findings of MR,AI    Subjective   Simi Sandoval is a 89 y.o. female with a PMHx of metastatic DLBCL of left breast (lytic lesions, pulm nodules, recently started on epocritamab), Afib on eliquis, HFpEF, NSTEMI?. S/p ureteral stent brought in for evaluation for L flank pain, diarrhea at OSH ED on 12/27. She underwent a colonoscopy 12/30 and was found to have C.diff colitis. She was started on PO vanc and was also treated with abx for possible aspiration PNA on CT C/A/P. CT was also showing pulm nodules b/l s/p bx of left lung nodule 12/11/2024 showed DLBL. She was transferred to [facility] for further work up and treatment. During her floor course at [facility], she has been having increased oxygen requirements. She was on standing IVF 1/09 and received 500cc of IVF bolus. She has received IV lasix 40mg sporadically with the most recent dose yesterday.  Per primary team and the son at bedside, patient also \"confused\" over the last one week and less conversant. Patient reports her breathing is \"okay I guess\". Denies chest pain. Per son at bedside, patient has been having worsening leg edema b/l as well as upper extremity b/l.   TTE 1/14/2025 showed normal LVEF, no regional LV wall motion abnormalities, severe MR and AI.  HF engaged to assist with diuresis.    TTE 12/2024:  - The left ventricle is normal in size. Left ventricular systolic function is   normal. EF = 57 ± 5% (2D 4-ch.) Normal left ventricular diastolic function.   - The right ventricle is normal in size. Right ventricular systolic function is   normal.   - The left atrial cavity is mildly dilated.   - There is moderate (2+ - 3+) aortic valve regurgitation.   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 Recommend clinical correlation/blood   cultures if necessary.   - Exam was " compared with the prior CC echocardiographic exam performed on 11/1/24.    There is no significant change.   Lexiscan stress test 12/9/2024 : No ischemia or scar.        The following portions of the chart were reviewed this encounter and updated as appropriate:  Allergies         Review of Systems  Otherwise, limited cardiovascular review of systems is negative.    No past medical history on file.  No past surgical history on file.  Social History     Socioeconomic History    Marital status: Legally      Spouse name: Not on file    Number of children: Not on file    Years of education: Not on file    Highest education level: Not on file   Occupational History    Not on file   Tobacco Use    Smoking status: Never     Passive exposure: Never    Smokeless tobacco: Never   Substance and Sexual Activity    Alcohol use: Not on file    Drug use: Not on file    Sexual activity: Not on file   Other Topics Concern    Not on file   Social History Narrative    Not on file     Social Drivers of Health     Financial Resource Strain: Low Risk  (1/9/2025)    Overall Financial Resource Strain (CARDIA)     Difficulty of Paying Living Expenses: Not very hard   Recent Concern: Financial Resource Strain - Medium Risk (1/8/2025)    Overall Financial Resource Strain (CARDIA)     Difficulty of Paying Living Expenses: Somewhat hard   Food Insecurity: Food Insecurity Present (1/8/2025)    Hunger Vital Sign     Worried About Running Out of Food in the Last Year: Sometimes true     Ran Out of Food in the Last Year: Sometimes true   Transportation Needs: No Transportation Needs (1/9/2025)    PRAPARE - Transportation     Lack of Transportation (Medical): No     Lack of Transportation (Non-Medical): No   Physical Activity: Insufficiently Active (1/8/2025)    Exercise Vital Sign     Days of Exercise per Week: 1 day     Minutes of Exercise per Session: 10 min   Stress: Stress Concern Present (1/8/2025)    [facility]  Occupational Health - Occupational Stress Questionnaire     Feeling of Stress : To some extent   Social Connections: Moderately Isolated (1/8/2025)    Social Connection and Isolation Panel [NHANES]     Frequency of Communication with Friends and Family: Twice a week     Frequency of Social Gatherings with Friends and Family: Twice a week     Attends [employer] Services: More than 4 times per year     Active Member of Clubs or Organizations: No     Attends Club or Organization Meetings: Never     Marital Status:    Intimate Partner Violence: Not At Risk (1/8/2025)    Humiliation, Afraid, Rape, and Kick questionnaire     Fear of Current or Ex-Partner: No     Emotionally Abused: No     Physically Abused: No     Sexually Abused: No   Housing Stability: Low Risk  (1/9/2025)    Housing Stability Vital Sign     Unable to Pay for Housing in the Last Year: No     Number of Times Moved in the Last Year: 0     Homeless in the Last Year: No   Recent Concern: Housing Stability - High Risk (1/8/2025)    Housing Stability Vital Sign     Unable to Pay for Housing in the Last Year: No     Number of Times Moved in the Last Year: 12     Homeless in the Last Year: No     No family history on file.    Current Outpatient Medications   Medication Instructions    acetaminophen (Tylenol) 325 mg tablet 2 tablets, oral, Every 4 hours PRN    amLODIPine (Norvasc) 5 mg tablet 1 tablet, oral, Daily    DULoxetine (CYMBALTA) 20 mg, oral, Daily, Do not crush or chew.    Eliquis 5 mg, oral, 2 times daily    famotidine (PEPCID) 20 mg, oral, Nightly    HYDROcodone-acetaminophen (Norco)  mg tablet 1 tablet, oral, Every 6 hours PRN    hydrOXYzine HCL (ATARAX) 5 mg, oral, Once    lidocaine (Lidoderm) 5 % patch Apply topically to affected area once a day. Keep on for 12 hours and remove for 12 hours before applying next patch.    loperamide (Imodium A-D) 2 mg capsule oral    LORazepam (ATIVAN) 0.5 mg, oral, See admin instructions, Once daily  as needed for anxiety.    magnesium chloride 71.5 mg, oral, Daily    magnesium oxide (Mag-Ox) 400 mg (241.3 mg magnesium) tablet 1 tablet, oral, Every 12 hours scheduled (0630,1830)    melatonin 3 mg tablet 1 tablet, oral, Nightly PRN    methIMAzole (Tapazole) 5 mg tablet oral    metoprolol succinate XL (Toprol-XL) 100 mg 24 hr tablet     rOPINIRole (REQUIP) 0.5 mg, oral, Nightly    sodium chloride 1 g, oral, 2 times daily         Objective   Physical Exam  Vitals:    01/14/25 1649   BP: 105/57   Pulse: 63   Resp: 20   Temp: 36.7 °C (98.1 °F)   SpO2: 92%       GEN: ill appearing, on venti mask  In no acute distress  CV: systolic 3/6 murmur+  JVD+  LUNGS: Crackles b/l  ABD: Soft, NT/ND,  EXT: cool extremities, 2-3+ pitting edema b/l both lower and upper extemity  NEURO: awake, alert, following commands    I have personally reviewed the following images and laboratory findings:    ECG:  .sinus rhythm    Results for orders placed during the hospital encounter of 01/08/25    Transthoracic Echo (TTE) Limited    [facility], [address] [place] [address]  Tel [phone] and Fax [phone]    TRANSTHORACIC ECHOCARDIOGRAM REPORT      Patient Name:       LEXUS CARRERA Reading Physician:    [id number] Marcia Kent MD  Study Date:         1/14/2025           Ordering Provider:    [id number] PATO MORILLO  MRN/PID:            [id number]            Fellow:  Accession#:         [id number]        Nurse:  Date of Birth/Age:  1935 / 89      Sonographer:          Yuliya aragon                                     RDMARGARETH  Gender assigned at  F                   Additional Staff:  Birth:  Height:             157.48 cm           Admit Date:           1/8/2025  Weight:             63.05 kg            Admission Status:     Inpatient - STAT  BSA / BMI:          1.64 m2 / 25.42     Encounter#:           [id number]  kg/m2  Blood Pressure:     99/59 mmHg          Department Location:  [facility]  3    Study Type:    TRANSTHORACIC ECHO (TTE) LIMITED  Diagnosis/ICD: Unspecified atrial fibrillation-I48.91  Indication:    Fluid overload  CPT Code:      Echo Limited-52706; Doppler Limited-98015; Color Doppler-96227    Patient History:  Pertinent History: DLBCL of left breast; A-Fib; PHTN; CHF; NSTEMI; Pulmonary  nodules; 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    Study Detail: The following Echo studies were performed: 2D, M-Mode, Doppler and  color flow. Technically challenging study due to body habitus and  patient lying in supine position.      Critical Event  Critical Event: Test was completed as per department protocol.  Critical Finding: Possible severe MR.  Time Test was Completed: 12:30:43 PM  Notified: Dr. Kent.  Attending notification time: 12:33:44 PM    PHYSICIAN INTERPRETATION:  Left Ventricle: Left ventricular ejection fraction is normal, calculated by Guerrero's biplane at 70%. There are no regional left ventricular wall motion abnormalities. The left ventricular cavity size is normal. There is normal septal and normal posterior left ventricular wall thickness. Left ventricular diastolic filling cannot be determined, due to severe mitral regurgitation.  Left Atrium: The left atrium is severely dilated.  Right Ventricle: The right ventricle is normal in size. There is normal right ventricular global systolic function.  Right Atrium: The right atrium is normal in size.  Aortic Valve: The aortic valve is trileaflet. There is mild to moderate aortic valve cusp calcification. There is mild to moderate aortic valve thickening. The aortic valve dimensionless index is 0.53. There is moderate to severe aortic valve regurgitation. The peak instantaneous gradient of the aortic valve is 17 mmHg. The mean gradient of the aortic valve is 8 mmHg.  Mitral Valve: The mitral valve is mildly thickened. There is moderate mitral annular calcification. There is severe mitral valve regurgitation which is  posteriorly directed. The mitral regurgitant orifice area is 37 mm2. The mitral regurgitant volume is 57.60 ml. There is flow reversal in the right pulmonary vein. Mildly thickened MV leaflets with relatively restricted leaflet closure ( posterior leaflet restriction is greater than anterior with annular dilatation from severely dilated LA causing severe posteriorly directed MR.  Tricuspid Valve: The tricuspid valve is structurally normal. There is moderate tricuspid regurgitation. The Doppler estimated RVSP is moderately elevated at 54.8 mmHg.  Pulmonic Valve: The pulmonic valve is structurally normal. Pulmonic valve regurgitation was not assessed.  Pericardium: Trivial pericardial effusion.  Pleural: There is left pleural effusion.  Aorta: The aortic root is normal.  Systemic Veins: The inferior vena cava appears normal in size, with IVC inspiratory collapse less than 50%.  In comparison to the previous echocardiogram(s): Compared with the prior exam at  from 3/15/2018 the severe MR and AI are new. Prior study with only mild MR and no AI. The LV systolic function remains preserved. Report from echo at [facility] in December ( no images available ) reported moderate to severe AI, normal LV systolic function and no mention of MR.      CONCLUSIONS:  1. Left ventricular ejection fraction is normal, calculated by Guerrero's biplane at 70%.  2. Left ventricular diastolic filling cannot be determined, due to severe mitral regurgitation.  3. There is normal right ventricular global systolic function.  4. The left atrium is severely dilated.  5. Mildly thickened MV leaflets with relatively restricted leaflet closure ( posterior leaflet restriction is greater than anterior with annular dilatation from severely dilated LA causing severe posteriorly directed MR.  6. There is moderate mitral annular calcification.  7. Severe mitral valve regurgitation.  8. Moderate tricuspid regurgitation visualized.  9. Moderately elevated  right ventricular systolic pressure.  10. Moderate to severe aortic valve regurgitation.  11. The inferior vena cava appears normal in size, with IVC inspiratory collapse less than 50%.  12. Ordering provider notified of findings at the time of reporting.  13. Compared with the prior exam at  from 3/15/2018 the severe MR and AI are new. Prior study with only mild MR and no AI. The LV systolic function remains preserved. Report from echo at [facility] in December ( no images available ) reported moderate to severe AI, normal LV systolic function and no mention of MR.    RECOMMENDATIONS:  Utilizing an FDA cleared automated machine learning algorithm (EchoGo Heart Failure by [employer]), the analysis of the apical 4-chamber echocardiogram suggests the presence of heart failure with preserved ejection fraction (HFpEF)*. Clinical correlation looking for additional heart failure signs and symptoms is recommended, as a definite diagnosis of heart failure cannot be made by imaging alone.  *Per ACC/AHA/HFSA universal diagnosis of heart failure, HFpEF is defined as 1) signs and symptoms leading to clinical diagnosis of heart failure, 2) an ejection fraction of at least 50%, and 3) evidence of elevated intra-cardiac filling pressures by echocardiography, BNP elevation, or catheterization.    QUANTITATIVE DATA SUMMARY:    2D MEASUREMENTS:          Normal Ranges:  Ao Root d:       3.00 cm  (2.0-3.7cm)  LAs:             4.90 cm  (2.7-4.0cm)  IVSd:            0.77 cm  (0.6-1.1cm)  LVPWd:           0.83 cm  (0.6-1.1cm)  LVIDd:           5.12 cm  (3.9-5.9cm)  LVIDs:           3.23 cm  LV Mass Index:   86 g/m2  LVEDV Index:     48 ml/m2  LV % FS          37.0 %      LA VOLUME:                  Normal Ranges:  LA Volume Index: 66.8 ml/m2      RA VOLUME BY A/L METHOD:          Normal Ranges:  RA Area A4C:             16.8 cm2      AORTA MEASUREMENTS:         Normal Ranges:  Ao Sinus, d:        3.00 cm (2.1-3.5cm)  Asc Ao, d:           3.20 cm (2.1-3.4cm)      LV SYSTOLIC FUNCTION BY 2D PLANIMETRY (MOD):  Normal Ranges:  EF-A4C View:    66 % (>=55%)  EF-A2C View:    72 %  EF-Biplane:     70 %  LV EF Reported: 70 %      LV DIASTOLIC FUNCTION:             Normal Ranges:  MV Peak E:             1.15 m/s    (0.7-1.2 m/s)  MV Peak A:             0.54 m/s    (0.42-0.7 m/s)  E/A Ratio:             2.14        (1.0-2.2)  MV A Dur:              121.79 msec  MV DT:                 132 msec    (150-240 msec)  PulmV Sys Juan Carlos:         40.96 cm/s  PulmV Cazares Juan Carlos:        74.11 cm/s  PulmV S/D Juan Carlos:         0.55  PulmV A Revs Juan Carlos:      27.80 cm/s  PulmV A Revs Dur:      154.14 msec      MITRAL INSUFFICIENCY:             Normal Ranges:  PISA Radius:          1.0 cm  MR VTI:               155.40 cm  MR Vmax:              459.00 cm/s  MR Alias Juan Carlos:         30.0 cm/s  MR Volume:            57.60 ml  MR Flow Rt:           170.12 ml/s  MR EROA:              37 mm2      AORTIC VALVE:                      Normal Ranges:  AoV Vmax:                2.04 m/s  (<=1.7m/s)  AoV Peak P.7 mmHg (<20mmHg)  AoV Mean P.6 mmHg  (1.7-11.5mmHg)  LVOT Max Juan Carlos:            1.05 m/s  (<=1.1m/s)  AoV VTI:                 42.64 cm  (18-25cm)  LVOT VTI:                22.53 cm  LVOT Diameter:           1.76 cm   (1.8-2.4cm)  AoV Area, VTI:           1.29 cm2  (2.5-5.5cm2)  AoV Area,Vmax:           1.25 cm2  (2.5-4.5cm2)  AoV Dimensionless Index: 0.53      AORTIC INSUFFICIENCY:  AI Vmax:       3.96 m/s  AI Half-time:  348 msec  AI Decel Time: 1201 msec  AI Decel Rate: 330.79 cm/s2      RIGHT VENTRICLE:  RV Basal 3.70 cm  RV Mid   2.30 cm  RV Major 5.7 cm  TAPSE:   23.0 mm  RV s'    0.14 m/s      TRICUSPID VALVE/RVSP:          Normal Ranges:  Peak TR Velocity:     3.42 m/s  RV Syst Pressure:     55 mmHg  (< 30mmHg)  IVC Diam:             1.90 cm      Pulmonary Veins:  PulmV A Revs Dur: 154.14 msec  PulmV A Revs Juan Carlos: 27.80 cm/s  PulmV Cazares Juan Carlos:   74.11 cm/s  PulmV  "S/D Juan Carlos:    0.55  PulmV Sys Juan Carlos:    40.96 cm/s      AORTA:  Asc Ao Diam 3.19 cm      [id number] Marcia Kent MD  Electronically signed on 1/14/2025 at 2:33:35 PM        ** Final **       Imaging  Chest x-ray: pulmonary edema     Lab Review   Lab Results   Component Value Date     (L) 01/14/2025     (L) 01/13/2025     (L) 01/13/2025    K 4.9 01/14/2025    K 4.9 01/13/2025    K 5.0 01/13/2025    CO2 26 01/14/2025    CO2 27 01/13/2025    CO2 27 01/13/2025    BUN 45 (H) 01/14/2025    BUN 42 (H) 01/13/2025    BUN 38 (H) 01/13/2025    CREATININE 1.38 (H) 01/14/2025    CREATININE 1.29 (H) 01/13/2025    CREATININE 1.09 (H) 01/13/2025    GLUCOSE 125 (H) 01/14/2025    GLUCOSE 121 (H) 01/13/2025    GLUCOSE 141 (H) 01/13/2025    CALCIUM 9.6 01/14/2025    CALCIUM 9.6 01/13/2025    CALCIUM 9.9 01/13/2025     No results found for: \"CKTOTAL\", \"CKMB\", \"CKMBINDEX\", \"TROPONINI\"  Lab Results   Component Value Date    WBC 19.0 (H) 01/14/2025    HGB 9.0 (L) 01/14/2025    HCT 24.6 (L) 01/14/2025    MCV 86 01/14/2025     01/14/2025       Troponin I, High Sensitivity (CMC)   Date/Time Value Ref Range Status   01/09/2025 02:30 AM 15 0 - 34 ng/L Final     BNP   Date/Time Value Ref Range Status   01/16/2021 05:55  (H) 0 - 99 pg/mL Final     Comment:     .  <100 pg/mL - Heart failure unlikely  100-299 pg/mL - Intermediate probability of acute heart  .               failure exacerbation. Correlate with clinical  .               context and patient history.    >=300 pg/mL - Heart Failure likely. Correlate with clinical  .               context and patient history.  BNP testing is performed using different testing   methodology at [facility] than at other   [facility] hospitals. Direct result comparisons should   only be made within the same method.     05/20/2020 12:58  (H) 0 - 99 pg/mL Final     Comment:     .  <100 pg/mL - Heart failure unlikely  100-299 pg/mL - Intermediate probability of acute heart  . "               failure exacerbation. Correlate with clinical  .               context and patient history.    >=300 pg/mL - Heart Failure likely. Correlate with clinical  .               context and patient history.  BNP testing is performed using different testing   methodology at [facility] than at other   system hospitals. Direct result comparisons should   only be made within the same method.          Assessment and Plan    89 y.o. female with a PMHx of metastatic DLBCL of left breast (lytic lesions, pulm nodules, recently started on epocritamab), Afib on eliquis, HFpEF, NSTEMI?S/p ureteral stent with recent colonoscopy at OSH 12/30 c/w C.diff colitis and pan colitis, s/p recent pulm nodule(seen on CT chest/abd/pelvis) bx consistent with metastatic DLBCL [place] [facility] [place] up and treatment of recurrence. She was initiated on epocritamab at . Unfortunately her floor course has been complicated by acute hypoxic respiratory failure in the setting of pulmonary edema. TTE demonstrated normal LVEF, severe MR, AI. HF engaged given new echo findings and to assist with diuresis.      #Acute hypoxic respiratory failure in the setting of pulm edema  #Acute decompensated heart failure  #severe MR, AI  TTE showing normal LVEF, moderate TR, moderately elevated RVSP, moderate to severe AI, severe MR.  In the setting of her hx of Afib, severely dilated LA(4.9cm), MR likely secondary/functional due to LA dilation.  #GURINDER likely cardiorenal syndrome  #hyponatremia-likely hypervolemic hyponatremia    - Please obtain the following: BNP, lactate.  - Diuresis: please continue IV diuresis. Recommend IV lasix 40mg BID and will reassess. Please ensure accurate I/Os and daily weights. At least 1.5-2L net negative fluid balance is recommended.  -trend renal function, electrolytes. Ensure K>4 and Mg>2.  -please stop sodium chloride tablet.  -continue BB for her afib  - Strict I/Os, Daily Na < 2g  daily.    For any questions or concerns, feel free to reach out via [email or web address] chat or page at [phone].         SIGNATURE: Ella Costa MD PATIENT NAME: Simi Sandoval   DATE/TIME: January 14, 2025 5:08 PM MRN: [id number]     To be staffed with Dr. Monroe. Note not finalized until attested by attending.

## 2025-01-14 NOTE — PROGRESS NOTES
Physical Therapy    Physical Therapy Evaluation    Patient Name: Simi Sandoval  MRN: [id number]  Department: [facility]  Room: [facility]-A  Today's Date: 1/14/2025   Time Calculation  Start Time: 0946  Stop Time: 1005  Time Calculation (min): 19 min    Assessment/Plan   PT Assessment  PT Assessment Results: Decreased strength, Decreased endurance, Impaired balance, Decreased mobility, Decreased safety awareness, Pain  Rehab Prognosis: Good  Barriers to Discharge Home: Physical needs, Caregiver assistance  Caregiver Assistance: Caregiver assistance needed per identified barriers - however, level of patient's required assistance exceeds assistance available at home  Physical Needs: Stair navigation into home limited by function/safety, Stair navigation to access bed limited by function/safety, Stair navigation to access bath limited by function/safety, Ambulating household distances limited by function/safety, High falls risk due to function or environment, 24hr mobility assistance needed  Evaluation/Treatment Tolerance: Patient limited by fatigue  Medical Staff Made Aware: Yes  Strengths: Ability to acquire knowledge, Attitude of self, Premorbid level of function  Barriers to Participation: Comorbidities  End of Session Communication: Bedside nurse  Assessment Comment: Pt is a 89 year old female who presents for left flank pain, left sided abdominal pain and diarrhea. Pt demonstrates deficits in strength, endurance, balance leading to impairments in funcitonal mobility. Pt is appropriate for moderate intensity therapy following hospital stay  End of Session Patient Position: Bed, 3 rail up, Alarm on  IP OR SWING BED PT PLAN  Inpatient or Swing Bed: Inpatient  PT Plan  Treatment/Interventions: Bed mobility, Transfer training, Gait training, Balance training, Stair training, Neuromuscular re-education, Strengthening, Range of motion, Endurance training, Therapeutic exercise, Therapeutic activity, Home exercise program,  Positioning, Postural re-education  PT Plan: Ongoing PT  PT Frequency: 3 times per week  PT Discharge Recommendations: Moderate intensity level of continued care  Equipment Recommended upon Discharge:  (TBD)  PT Recommended Transfer Status: Total assist    Subjective   General Visit Information:  General  Reason for Referral: left flank pain, left  sided abdominal pain  and diarrhea  Past Medical History Relevant to Rehab: DLBCL of left breast, A-fib (on Eliquis), pulmonary hypertension, Hysterectomy, Thyroid disease,  CHF, NSTEMI, s/p uretral 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 Pt with eyes closed majority of the session reporting increased fatigue.  Home Living:  Home Living  Type of Home: House  Lives With: Adult children (sons, reports they work during the day)  Home Adaptive Equipment: Cane  Home Layout: Multi-level, Laundry in basement, Able to live on main level with bedroom/bathroom  Home Access: Stairs to enter without rails  Entrance Stairs-Rails: None  Entrance Stairs-Number of Steps: 3  Bathroom Shower/Tub: Tub/shower unit  Bathroom Toilet: Standard  Bathroom Equipment: Grab bars in shower (shower chair)  Prior Level of Function:  Prior Function Per Pt/Caregiver Report  Level of [place]: Independent with ADLs and functional transfers, Independent with homemaking with ambulation  ADL Assistance: Independent  Homemaking Assistance: Independent  Ambulatory Assistance: Independent (cane)  Vocational: Retired  Prior Function Comments: no hx of falls, (+) driving  Precautions:  Precautions  Medical Precautions: Fall precautions  Precautions Comment:  contact plus, protective     Vital Signs (Past 2hrs)        Date/Time Vitals Session Patient Position Pulse Resp SpO2 BP MAP (mmHg)    01/14/25 0946 Pre PT  Lying  60  --  93 %  118/66  --     01/14/25 0948 During PT  Sitting  63  --  95 %  118/65  --     01/14/25 0949 Post PT  Lying  60  --  93 %  109/58  --                         Objective   Pain:  Pain Assessment  Pain Assessment: 0-10  0-10 (Numeric) Pain Score:  (pt reports pain in L LE but does not provide a rating)  Cognition:  Cognition  Overall Cognitive Status: Within Functional Limits  Insight: Mild    General Assessments:  Activity Tolerance  Endurance: Tolerates 10 - 20 min exercise with multiple rests, Decreased tolerance for upright activites    Sensation  Light Touch: No apparent deficits       Static Sitting Balance  Static Sitting-Balance Support: Feet supported, Bilateral upper extremity supported  Static Sitting-Level of Assistance:  (CGA-mod A)  Dynamic Sitting Balance  Dynamic Sitting-Balance Support: Feet supported, Bilateral upper extremity supported  Dynamic Sitting-Level of Assistance: Moderate assistance    Functional Assessments:  Bed Mobility  Bed Mobility: Yes  Bed Mobility 1  Bed Mobility 1: Supine to sitting, Sitting to supine  Level of Assistance 1: Dependent, Maximum verbal cues (x2)  Bed Mobility Comments 1: VCs, HOB elevated, use of draw sheet to assist, assistance for upper body and LEs  Bed Mobility 2  Bed Mobility  2: Scooting (to boost up in bed with draw sheet)  Level of Assistance 2: Dependent (x2)    Transfers  Transfer: No (unable to assess due to fatigue and sitting balance this date)    Ambulation/Gait Training  Ambulation/Gait Training Performed: No    Stairs  Stairs: No  Extremity/Trunk Assessments:  RLE   RLE :  (observed partial movement into knee extension against gravity 2+/5, pt unable to hold full knee extension in sitting, limited due to command following, ankle DF/PF 3/5)  LLE   LLE :  (observed partial  movement into knee extension against gravity 2+/5, pt unable to hold full knee extension in sitting, limited due to command following, ankle DF/PF 3/5)  Outcome Measures:  [facility] Basic Mobility  Turning from your back to your side while in a flat bed without using bedrails: Total  Moving from lying on your back to sitting on the side of a flat bed without using bedrails: Total  Moving to and from bed to chair (including a wheelchair): Total  Standing up from a chair using your arms (e.g. wheelchair or bedside chair): Total  To walk in hospital room: Total  Climbing 3-5 steps with railing: Total  Basic Mobility - Total Score: 6    Encounter Problems       Encounter Problems (Active)       Balance       STG - Maintains static sitting balance with upper extremity support and SBA and dynamic sitting balance with upper extremity support with min A       Start:  01/14/25    Expected End:  01/28/25               Mobility       STG - Patient will ambulate >5 ft with mod A and FWW with no LOB, progress as able and appropriate        Start:  01/14/25    Expected End:  01/28/25            Pt will be able to sit on EOB for ~10 minutes with CGA-SBA and no signs of fatigue or SOB        Start:  01/14/25    Expected End:  01/28/25               PT Transfers       STG - Patient will perform bed mobility mod A        Start:  01/14/25    Expected End:  01/28/25            STG - Patient will transfer sit to and from stand with mod A and FWW        Start:  01/14/25    Expected End:  01/28/25               Pain - Adult              Education Documentation  Precautions, taught by Christen Galo PT at 1/14/2025 11:24 AM.  Learner: Patient  Readiness: Acceptance  Method: Explanation  Response: Needs Reinforcement  Comment: bed mobility, sitting balance and posture, importance of PT during hospital stay    Mobility Training, taught by Christen Galo PT at 1/14/2025 11:24 AM.  Learner: Patient  Readiness: Acceptance  Method:  Explanation  Response: Needs Reinforcement  Comment: bed mobility, sitting balance and posture, importance of PT during hospital stay    Education Comments  No comments found.        01/14/25 at 11:26 AM - Christen Galo, PT

## 2025-01-14 NOTE — SIGNIFICANT EVENT
.Rapid Response Nurse Note: RADAR alert: 6    Pager time: [phone]  Arrival time: 810  Event end time: 825  Location: [place] [address]  [] Triage by phone or secure messaging    Rapid response initiated by:  [] Rapid response RN [] Family [] Nursing Supervisor [] Physician   [x] RADAR auto page [] Sepsis auto-page [] RN [] RT   [] NP/PA [] Other:     Primary reason for call:   [] BAT [] New CPAP/BiPAP [] Bleeding [] Change in mental status   [] Chest pain [] Code blue [] FiO2 >/= 50% [] HR </= 40 bpm   [] HR >/= 130 bpm [] Hyperglycemia [] Hypoglycemia [x] RADAR    [] RR </= 8 bpm [] RR >/= 30 bpm [] SBP </= 90 mmHg [] SpO2 < 90%   [] Seizure [] Sepsis [] Shortness of breath  [] Staff concern: see comments     Initial VS and/or RADAR VS: T 36.3 °C; HR 59; RR 20; BP 99/59; SPO2 93%.        Interventions:  [x] None [] ABG/VBG [] Assist w/ICU transfer [] BAT paged    [] Bag mask [] Blood [] Cardioversion [] Code Blue   [] Code blue for intubation [] Code status changed [] Chest x-ray [] EKG   [] IV fluid/bolus [] KUB x-ray [] Labs/cultures [] Medication   [] Nebulizer treatment [] NIPPV (CPAP/BiPAP) [] Oxygen [] Oral airway   [] Peripheral IV [] Palliative care consult [] CT/MRI [] Sepsis protocol    [] Suctioned [] Other:     Outcome:  [] Coded and  [] Code blue for intubation [] Coded and transferred to ICU []  on division   [x] Remained on division (no change) [] Remained on division + additional monitoring [] Remained in ED [] Transferred to ED   [] Transferred to ICU [] Transferred to inpatient status [] Transferred for interventions (procedure) [] Transferred to ICU stepdown    [] Transferred to surgery [] Transferred to telemetry [] Sepsis protocol [] STEMI protocol   [] Stroke protocol [x] Bedside nurse instructed to page rapid response for any concerns or acute change in condition/VS     Additional Comments: Upon arrival patient found sleeping in bed, Venti mask 4 L/30%, Staff at bedside placing patient on  continuous pulse oxygenation monitoring. Reviewed above RADAR VS with bedside RN.  VS within patient's current trends. No interventions by rapid response team indicated at this time.  Staff to page rapid response for any concerns or acute change in condition/VS.

## 2025-01-14 NOTE — PROGRESS NOTES
01/14/25 1600   Discharge Planning   Living Arrangements Children   Support Systems Children   Assistance Needed ADLs   Type of Residence Private residence   Who is requesting discharge planning? Provider   Home or Post Acute Services Post acute facilities (Rehab/SNF/etc)   Type of Post Acute Facility Services Skilled nursing   Expected Discharge Disposition SNF   Does the patient need discharge transport arranged? Yes   RoundTrip coordination needed? Yes   Has discharge transport been arranged? No   Patient Choice   Provider Choice list and CMS website (https://medicare.gov/care-compare#search) for post-acute Quality and Resource Measure Data were provided and reviewed with: Patient;Family     LSW met with pt and son at bedside to discuss PT rec of mod/SNF. Pt confused, but son/NOK agreeable to SNF placement and [facility] and give [facility]. LSW to cont to follow.

## 2025-01-14 NOTE — SIGNIFICANT EVENT
Rapid Response Nurse Note: RADAR alert: 9    Pager time:   Arrival time:   Event end time:   Location: [facility]  [] Triage by phone or secure messaging    Rapid response initiated by:  [] Rapid response RN [] Family [] Nursing Supervisor [] Physician   [x] RADAR auto page [] Sepsis auto-page [] RN [] RT   [] NP/PA [] Other:     Primary reason for call:   [] BAT [] New CPAP/BiPAP [] Bleeding [] Change in mental status   [] Chest pain [] Code blue [] FiO2 >/= 50% [] HR </= 40 bpm   [] HR >/= 130 bpm [] Hyperglycemia [] Hypoglycemia [x] RADAR    [] RR </= 8 bpm [] RR >/= 30 bpm [] SBP </= 90 mmHg [] SpO2 < 90%   [] Seizure [] Sepsis [] Shortness of breath  [] Staff concern: see comments     Initial VS and/or RADAR VS: T 36.4 °C; HR 62; RR 28; /52; SPO2 84%.    Providers present at bedside (if applicable): Nadir NEIL    Name of ICU Provider contacted (if applicable): NA    Interventions:  [] None [x] ABG/VBG [] Assist w/ICU transfer [] BAT paged    [] Bag mask [] Blood [] Cardioversion [] Code Blue   [] Code blue for intubation [] Code status changed [x] Chest x-ray [] EKG   [] IV fluid/bolus [] KUB x-ray [] Labs/cultures [x] Medication   [] Nebulizer treatment [] NIPPV (CPAP/BiPAP) [] Oxygen [] Oral airway   [] Peripheral IV [] Palliative care consult [] CT/MRI [] Sepsis protocol    [] Suctioned [] Other:     Outcome:  [] Coded and  [] Code blue for intubation [] Coded and transferred to ICU []  on division   [] Remained on division (no change) [x] Remained on division + additional monitoring [] Remained in ED [] Transferred to ED   [] Transferred to ICU [] Transferred to inpatient status [] Transferred for interventions (procedure) [] Transferred to ICU stepdown    [] Transferred to surgery [] Transferred to telemetry [] Sepsis protocol [] STEMI protocol   [] Stroke protocol [x] Bedside nurse instructed to page rapid response for any concerns or acute change in condition/VS     Additional  Comments:   Rapid response team at bedside responding to RADAR 9. Upon arrival, pt lying in bed, tachypneic, arouses easily. Provider Gersin at bedside. Crackles noted by RR RT. 40mg lasix given IVP. ABG drawn. CXR complete. Pt weaned to 40% VM with plan for floor to wean pt further as tolerated. Plan for continuous SpO2 monitoring. Ending VS: HR: 62 R: 20-24 BP: 124/52 SpO2: 94%. No additional interventions by rapid response team indicated at this time.  Staff to page rapid response for any concerns or acute change in condition/VS. Carlos Montejo RN.

## 2025-01-14 NOTE — PROGRESS NOTES
Occupational Therapy    Evaluation    Patient Name: Simi Sandoval  MRN: [id number]  Department: [facility]  Room: [facility]3004-A  Today's Date: 1/14/2025  Time Calculation  Start Time: 0946  Stop Time: 1005  Time Calculation (min): 19 min        Assessment:  OT Assessment: Pt presents to OT with increased falls risk, decreased safety and independence with functional transfers and mobility and impaired ADL performance.  Pt will continue to benefit from skilled OT services to address deficits and facilitate safe return to PLOF and home environment.   Prognosis: Good  Barriers to Discharge Home: Caregiver assistance, Physical needs  Caregiver Assistance: Caregiver assistance needed per identified barriers - however, level of patient's required assistance exceeds assistance available at home  Physical Needs: 24hr mobility assistance needed, 24hr ADL assistance needed, High falls risk due to function or environment, Ambulating household distances limited by function/safety, In-home setup navigation limited by function/safety  Evaluation/Treatment Tolerance: Patient limited by fatigue  Medical Staff Made Aware: Yes  End of Session Communication: Bedside nurse  End of Session Patient Position: Bed, 3 rail up, Alarm on  OT Assessment Results: Decreased ADL status, Decreased upper extremity strength, Decreased upper extremity range of motion, Decreased cognition, Decreased endurance, Decreased functional mobility, Decreased IADLs, Decreased trunk control for functional activities  Prognosis: Good  Evaluation/Treatment Tolerance: Patient limited by fatigue  Medical Staff Made Aware: Yes  Plan:  Treatment Interventions: ADL retraining, Functional transfer training, UE strengthening/ROM, Endurance training, Cognitive reorientation, Patient/family training, Equipment evaluation/education, Compensatory technique education, Continued evaluation  OT Frequency: 3 times per week  OT Discharge Recommendations: Moderate intensity level of  continued care  OT Recommended Transfer Status: Assist of 2  OT - OK to Discharge: Yes  Treatment Interventions: ADL retraining, Functional transfer training, UE strengthening/ROM, Endurance training, Cognitive reorientation, Patient/family training, Equipment evaluation/education, Compensatory technique education, Continued evaluation    Subjective   Current Problem:  1. DLBCL (diffuse large B cell lymphoma)  CANCELED: Lower extremity venous duplex left    CANCELED: Lower extremity venous duplex left      2. Edema of left lower extremity  Vascular US lower extremity venous duplex bilateral    Vascular US lower extremity venous duplex bilateral    CANCELED: Lower extremity venous duplex left    CANCELED: Lower extremity venous duplex left      3. 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)  Clinic Appointment Request    Infusion Appointment Request    CBC and Auto Differential    acetaminophen (Tylenol) tablet 650 mg    diphenhydrAMINE (BENADryl) capsule 50 mg    dexAMETHasone (Decadron) tablet 16 mg    prochlorperazine (Compazine) tablet 10 mg    prochlorperazine (Compazine) injection 10 mg    epcoritamab-bysp (Epkinly) syringe 0.16 mg    sodium chloride 0.9 % bolus 500 mL    dextrose 5 % in water (D5W) bolus 500 mL    diphenhydrAMINE (BENADryl) injection 50 mg    methylPREDNISolone sod succinate (SOLU-Medrol) 40 mg/mL injection 40 mg    famotidine PF (Pepcid) injection 20 mg    EPINEPHrine HCl (PF) (Adrenalin) injection 0.3 mg    albuterol 2.5 mg /3 mL (0.083 %) nebulizer solution 3 mL    Treatment Conditions    Nursing Communication - Cytokine Release Syndrome (CRS)    Nursing Communication - Epcoritamab    Nursing Communication - Hypersensitivity Management, Moderate    Nursing Communication - Hypersensitivity Management, Severe    Nursing Communication - Respiratory Management    Treatment Conditions    Nursing Communication - Cytokine Release Syndrome (CRS)    Nursing Communication  - Epcoritamab    Nursing Communication - Hypersensitivity Management, Moderate    Nursing Communication - Hypersensitivity Management, Severe    Nursing Communication - Respiratory Management    dexAMETHasone (Decadron) tablet 16 mg    CANCELED: Pulse oximetry, continuous    CANCELED: Pulse oximetry, continuous      4. Atrial fibrillation with RVR (Multi)  Transthoracic Echo (TTE) Limited    Transthoracic Echo (TTE) Limited      5. 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, with stage 1 through stage 4 chronic kidney disease, or unspecified chronic kidney disease  Transthoracic Echo (TTE) Limited    Transthoracic Echo (TTE) Limited        General:  General  Reason for Referral: left flank pain, left  sided abdominal pain  and diarrhea  Past Medical History Relevant to Rehab: DLBCL of left breast, A-fib (on Eliquis), pulmonary hypertension, Hysterectomy, Thyroid disease,  CHF, NSTEMI, s/p uretral stent (12/13)  Co-Treatment: PT  Co-Treatment Reason: AMPAC>10, to maximize pt safety and therapeutic potential in pt requiring 2 skilled A, focus on OT goals  Prior to Session Communication: Bedside nurse  Patient Position Received: Bed, 3 rail up, Alarm on  General Comment: cooperative, somewhat lethargic on approach, willing to participate, difficult to understand at times  Precautions:  Medical Precautions: Fall precautions  Precautions Comment: contact plus, protective       01/14/25 0946   Vital Signs   Vitals Session Pre PT   Heart Rate 60   Heart Rate Source Monitor   SpO2 93 %   /66   BP Location Left arm   BP Method Automatic   Patient Position Lying         Pain:  Pain Assessment  Pain Assessment: 0-10  0-10 (Numeric) Pain Score:  (endorsed LLE pain, not rated; did not limit participation)    Objective   Cognition:  Overall Cognitive Status: Within Functional Limits  Orientation Level: Disoriented to situation, Disoriented to place  Attention: Exceptions to WFL  Abstract Reasoning:  Exceptions to WFL  Insight: Mild  Impulsive: Mildly           Home Living:  Type of Home: House  Lives With:  (2 sons)  Home Adaptive Equipment: Cane  Home Layout: Multi-level, Laundry in basement, Able to live on main level with bedroom/bathroom  Home Access: Stairs to enter without rails  Entrance Stairs-Rails: None  Entrance Stairs-Number of Steps: 3  Bathroom Shower/Tub: Tub/shower unit  Bathroom Toilet: Standard  Bathroom Equipment: Grab bars in shower, Shower chair with back  Prior Function:  Level of [place]: Independent with ADLs and functional transfers, Independent with homemaking with ambulation  ADL Assistance: Independent  Homemaking Assistance: Independent  Ambulatory Assistance: Independent (cane prn)  Vocational: Retired  Prior Function Comments: -falls  IADL History:  Current License: Yes  Mode of Transportation: Car  ADL:  Eating Assistance: Maximal (anticipate)  Grooming Assistance: Total (anticipate)  Bathing Assistance: Total (anticipate)  UE Dressing Assistance: Total (anticipate)  LE Dressing Assistance: Total  LE Dressing Deficit: Don/doff R sock, Don/doff L sock  Toileting Assistance with Device: Total (anticipate)  Activity Tolerance:  Endurance: Decreased tolerance for upright activites  Bed Mobility/Transfers: Bed Mobility  Bed Mobility: Yes  Bed Mobility 1  Bed Mobility 1: Supine to sitting, Sitting to supine  Level of Assistance 1: Dependent, Maximum verbal cues, +2  Bed Mobility Comments 1: draw sheet assist, HOB raised, increased time, cues and assist to manage BLEs in/out of bed and elevate/lower trunk  Bed Mobility 2  Bed Mobility  2: Scooting  Level of Assistance 2: Dependent, +2  Bed Mobility Comments 2: boost/position within bed    Transfers  Transfer: No      Functional Mobility:  Functional Mobility  Functional Mobility Performed: No  Sitting Balance:  Static Sitting Balance  Static Sitting-Comment/Number of Minutes: with RUE supported on forearm able to achieve CGA, quick  to fatigue; initially mod A  Dynamic Sitting Balance  Dynamic Sitting-Comments: min-mod A    Vision:Vision - Basic Assessment  Current Vision: No visual deficits    Sensation:  Light Touch: No apparent deficits  Strength:  Strength Comments: ELVAE 2-/5  Perception:  Inattention/Neglect: Appears intact  Initiation: Cues to initiate tasks  Motor Planning: Cues to use objects appropriately  Perseveration: Not present  Coordination:  Movements are Fluid and Coordinated: Yes   Hand Function:  Gross Grasp:  (weak gross grasp)  Coordination:  (decreased)  Extremities: RUE   RUE :  (edematous, PROM WFL- AROM limited 2/2 gross debility) and LUE   LUE:  (edematous, PROM WFL- AROM limited 2/2 gross debility)    Outcome Measures:[facility] Daily Activity  Putting on and taking off regular lower body clothing: Total  Bathing (including washing, rinsing, drying): Total  Putting on and taking off regular upper body clothing: Total  Toileting, which includes using toilet, bedpan or urinal: Total  Taking care of personal grooming such as brushing teeth: Total  Eating Meals: A lot  Daily Activity - Total Score: 7         and Brief Confusion Assessment Method (bCAM)  CAM Result: CAM -    Education Documentation  Body Mechanics, taught by Grace Ruiz OT at 1/14/2025  1:30 PM.  Learner: Patient  Readiness: Acceptance  Method: Explanation  Response: Needs Reinforcement    Precautions, taught by Grace Ruiz OT at 1/14/2025  1:30 PM.  Learner: Patient  Readiness: Acceptance  Method: Explanation  Response: Needs Reinforcement    ADL Training, taught by Grace Ruiz OT at 1/14/2025  1:30 PM.  Learner: Patient  Readiness: Acceptance  Method: Explanation  Response: Needs Reinforcement    Education Comments  No comments found.        Goals:  Encounter Problems       Encounter Problems (Active)       ADLs       Patient will complete daily grooming tasks with independent level of assistance and PRN adaptive equipment while standing.        Start:  01/14/25    Expected End:  02/04/25            Patient will demonstrate lower body dressing with modified independent level of assistancedonning and doffing all LE clothes  with PRN adaptive equipment       Start:  01/14/25    Expected End:  02/04/25            Patient will perform toileting tasks, including hygiene and clothing management with independent level of assistance       Start:  01/14/25    Expected End:  02/04/25               COGNITION/SAFETY       Patient will follow 100% Multistep commands to allow improved ADL performance.       Start:  01/14/25    Expected End:  02/04/25            Patient will demonstrated orientation x 4.       Start:  01/14/25    Expected End:  02/04/25               EXERCISE/STRENGTHENING       pt will be IND with BUE HEP for increasing functional strength and activity tolerance required for ADL completion       Start:  01/14/25    Expected End:  02/04/25               MOBILITY       pt will safely demonstrate functional mobility, to/from bathroom and at other household distances, navigating around environmental barriers with LRD and mod I        Start:  01/14/25    Expected End:  02/04/25               TRANSFERS       Patient will demonstrate functional transfers with least restrictive device with modified independent level of assistance.       Start:  01/14/25    Expected End:  02/04/25 01/14/25 at 1:41 PM   Grace Ruiz, OT   Rehab Office: [phone]

## 2025-01-14 NOTE — SIGNIFICANT EVENT
Rapid Response Respiratory Therapy Note: RODDY henning  Start time: 2007  End time: 2038  Location: S 3004     Respiratory Concerns:  []  None []  Increased WOB []  Shallow respirations []  Irregular Respirations   [x]  Tachypnea []  Bradypnea [x]  Oxygen desaturation []  Cyanosis   []  Poor Secretion Clearance []  Impaired/Weak Cough []  Copious Secretions []  Thick Secretions   []  Inability to Protect Airway []  Apnea []  Respiratory Arrest []  Shortness of Breath   [] Hemodynamic Instability []  Asymmetric Chest Rise []  Adventitious Breath Sounds []  Abnormal CXR   []  Aspiration [x]  Other: Bilateral crackles with extensive edema      Interventions:  []  None [x]  ABG/VBG []  Assist w/ICU transfer []  Bag-mask ventilation   []  Bronchial Hygiene []  Trach care/Suction []  ETCO2 []  CPR   []  Assist Intubation []  Venti Mask []  Chest x-ray []  CT/MRI transport    []  High Flow Therapy []  Igel  []  Nasal Airway []  NT Suctioning   []  Nebulizer treatment []  NIPPV (CPAP/BiPAP) [x]  Oxygen  Type: Venti mask  FiO2: 50%  Liter Flow: 12L []  Oral Airway   []  Oral Suctioning []  Other: 40mg Lasix      Outcome:  [x]  Maintain on Division []  Transferred to ICU []  Transferred to ED [x]  Bedside nurse instructed to page Rapid Response for any concerns or acute change in condition/VS     Additional Comments:  Pt found to be tachypneic with bilateral crackles. ABG drawn and 40mg of lasix given. Pt endorses occasional SOB. Pt initially on 3L NC with desaturation and moved to 12L/50% venti - SpO2 94%. Staff encouraged to reach out with additional concerns and will work to wean O2.

## 2025-01-14 NOTE — CARE PLAN
Problem: Respiratory  Goal: Clear secretions with interventions this shift  Outcome: Progressing  Goal: Minimize anxiety/maximize coping throughout shift  Outcome: Progressing  Goal: Minimal/no exertional discomfort or dyspnea this shift  Outcome: Progressing  Goal: No signs of respiratory distress (eg. Use of accessory muscles. Peds grunting)  Outcome: Progressing  Goal: Patent airway maintained this shift  Outcome: Progressing  Goal: Tolerate mechanical ventilation evidenced by VS/agitation level this shift  Outcome: Progressing  Goal: Tolerate pulmonary toileting this shift  Outcome: Progressing  Goal: Verbalize decreased shortness of breath this shift  Outcome: Progressing  Goal: Wean oxygen to maintain O2 saturation per order/standard this shift  Outcome: Progressing  Goal: Increase self care and/or family involvement in next 24 hours  Outcome: Progressing     Problem: Fall/Injury  Goal: Not fall by end of shift  Outcome: Progressing  Goal: Be free from injury by end of the shift  Outcome: Progressing  Goal: Verbalize understanding of personal risk factors for fall in the hospital  Outcome: Progressing  Goal: Verbalize understanding of risk factor reduction measures to prevent injury from fall in the home  Outcome: Progressing  Goal: Use assistive devices by end of the shift  Outcome: Progressing  Goal: Pace activities to prevent fatigue by end of the shift  Outcome: Progressing     Problem: Infection related to problem list condition  Goal: Infection will resolve through treatment  Outcome: Progressing     Problem: Skin  Goal: Decreased wound size/increased tissue granulation at next dressing change  Outcome: Progressing  Flowsheets (Taken 1/14/2025 0059)  Decreased wound size/increased tissue granulation at next dressing change: Protective dressings over bony prominences  Goal: Participates in plan/prevention/treatment measures  Outcome: Progressing  Flowsheets (Taken 1/14/2025 0059)  Participates in  plan/prevention/treatment measures: Increase activity/out of bed for meals  Goal: Prevent/manage excess moisture  Outcome: Progressing  Flowsheets (Taken 1/14/2025 0059)  Prevent/manage excess moisture: Monitor for/manage infection if present  Goal: Prevent/minimize sheer/friction injuries  Outcome: Progressing  Flowsheets (Taken 1/14/2025 0059)  Prevent/minimize sheer/friction injuries: Use pull sheet  Goal: Promote/optimize nutrition  Outcome: Progressing  Flowsheets (Taken 1/14/2025 0059)  Promote/optimize nutrition: Assist with feeding  Goal: Promote skin healing  Outcome: Progressing  Flowsheets (Taken 1/14/2025 0059)  Promote skin healing: Turn/reposition every 2 hours/use positioning/transfer devices     Problem: Pain  Goal: Takes deep breaths with improved pain control throughout the shift  Outcome: Progressing  Goal: Turns in bed with improved pain control throughout the shift  Outcome: Progressing  Goal: Walks with improved pain control throughout the shift  Outcome: Progressing  Goal: Performs ADL's with improved pain control throughout shift  Outcome: Progressing  Goal: Participates in PT with improved pain control throughout the shift  Outcome: Progressing  Goal: Free from opioid side effects throughout the shift  Outcome: Progressing  Goal: Free from acute confusion related to pain meds throughout the shift  Outcome: Progressing     Problem: Pain - Adult  Goal: Verbalizes/displays adequate comfort level or baseline comfort level  Outcome: Progressing     Problem: Safety - Adult  Goal: Free from fall injury  Outcome: Progressing     Problem: Discharge Planning  Goal: Discharge to home or other facility with appropriate resources  Outcome: Progressing     Problem: Chronic Conditions and Co-morbidities  Goal: Patient's chronic conditions and co-morbidity symptoms are monitored and maintained or improved  Outcome: Progressing

## 2025-01-15 ENCOUNTER — APPOINTMENT (OUTPATIENT)
Dept: RADIOLOGY | Facility: HOSPITAL | Age: 89
DRG: 840 | End: 2025-01-15
Payer: MEDICARE

## 2025-01-15 LAB
ALBUMIN SERPL BCP-MCNC: 2.6 G/DL (ref 3.4–5)
ALBUMIN SERPL BCP-MCNC: 2.6 G/DL (ref 3.4–5)
ALP SERPL-CCNC: 77 U/L (ref 33–136)
ALT SERPL W P-5'-P-CCNC: 22 U/L (ref 7–45)
ANION GAP SERPL CALC-SCNC: 12 MMOL/L (ref 10–20)
ANION GAP SERPL CALC-SCNC: 12 MMOL/L (ref 10–20)
APPEARANCE UR: CLEAR
AST SERPL W P-5'-P-CCNC: 18 U/L (ref 9–39)
BASOPHILS # BLD AUTO: 0.04 X10*3/UL (ref 0–0.1)
BASOPHILS NFR BLD AUTO: 0.2 %
BILIRUB DIRECT SERPL-MCNC: 0.2 MG/DL (ref 0–0.3)
BILIRUB SERPL-MCNC: 0.5 MG/DL (ref 0–1.2)
BILIRUB UR STRIP.AUTO-MCNC: NEGATIVE MG/DL
BUN SERPL-MCNC: 54 MG/DL (ref 6–23)
BUN SERPL-MCNC: 55 MG/DL (ref 6–23)
CALCIUM SERPL-MCNC: 9.2 MG/DL (ref 8.6–10.6)
CALCIUM SERPL-MCNC: 9.3 MG/DL (ref 8.6–10.6)
CHLORIDE SERPL-SCNC: 92 MMOL/L (ref 98–107)
CHLORIDE SERPL-SCNC: 93 MMOL/L (ref 98–107)
CO2 SERPL-SCNC: 28 MMOL/L (ref 21–32)
CO2 SERPL-SCNC: 29 MMOL/L (ref 21–32)
COLOR UR: ABNORMAL
CREAT SERPL-MCNC: 1.45 MG/DL (ref 0.5–1.05)
CREAT SERPL-MCNC: 1.51 MG/DL (ref 0.5–1.05)
CRP SERPL-MCNC: 0.42 MG/DL
EGFRCR SERPLBLD CKD-EPI 2021: 33 ML/MIN/1.73M*2
EGFRCR SERPLBLD CKD-EPI 2021: 35 ML/MIN/1.73M*2
EOSINOPHIL # BLD AUTO: 0.05 X10*3/UL (ref 0–0.4)
EOSINOPHIL NFR BLD AUTO: 0.2 %
ERYTHROCYTE [DISTWIDTH] IN BLOOD BY AUTOMATED COUNT: 18.7 % (ref 11.5–14.5)
FERRITIN SERPL-MCNC: 423 NG/ML (ref 8–150)
FIBRINOGEN PPP-MCNC: 283 MG/DL (ref 200–400)
GLUCOSE SERPL-MCNC: 122 MG/DL (ref 74–99)
GLUCOSE SERPL-MCNC: 123 MG/DL (ref 74–99)
GLUCOSE UR STRIP.AUTO-MCNC: NORMAL MG/DL
HCT VFR BLD AUTO: 23.4 % (ref 36–46)
HGB BLD-MCNC: 9.4 G/DL (ref 12–16)
HYALINE CASTS #/AREA URNS AUTO: ABNORMAL /LPF
IMM GRANULOCYTES # BLD AUTO: 0.28 X10*3/UL (ref 0–0.5)
IMM GRANULOCYTES NFR BLD AUTO: 1.3 % (ref 0–0.9)
KETONES UR STRIP.AUTO-MCNC: NEGATIVE MG/DL
LDH SERPL L TO P-CCNC: 467 U/L (ref 84–246)
LEUKOCYTE ESTERASE UR QL STRIP.AUTO: NEGATIVE
LYMPHOCYTES # BLD AUTO: 0.16 X10*3/UL (ref 0.8–3)
LYMPHOCYTES NFR BLD AUTO: 0.8 %
MAGNESIUM SERPL-MCNC: 2.9 MG/DL (ref 1.6–2.4)
MAGNESIUM SERPL-MCNC: 2.98 MG/DL (ref 1.6–2.4)
MCH RBC QN AUTO: 39.5 PG (ref 26–34)
MCHC RBC AUTO-ENTMCNC: 40.2 G/DL (ref 32–36)
MCV RBC AUTO: 98 FL (ref 80–100)
MONOCYTES # BLD AUTO: 0.89 X10*3/UL (ref 0.05–0.8)
MONOCYTES NFR BLD AUTO: 4.2 %
MUCOUS THREADS #/AREA URNS AUTO: ABNORMAL /LPF
NEUTROPHILS # BLD AUTO: 19.54 X10*3/UL (ref 1.6–5.5)
NEUTROPHILS NFR BLD AUTO: 93.3 %
NITRITE UR QL STRIP.AUTO: NEGATIVE
NRBC BLD-RTO: 0.1 /100 WBCS (ref 0–0)
PH UR STRIP.AUTO: 6.5 [PH]
PHOSPHATE SERPL-MCNC: 3.3 MG/DL (ref 2.5–4.9)
PHOSPHATE SERPL-MCNC: 3.5 MG/DL (ref 2.5–4.9)
PLATELET # BLD AUTO: 417 X10*3/UL (ref 150–450)
POTASSIUM SERPL-SCNC: 4.3 MMOL/L (ref 3.5–5.3)
POTASSIUM SERPL-SCNC: 4.3 MMOL/L (ref 3.5–5.3)
PROT SERPL-MCNC: 4.5 G/DL (ref 6.4–8.2)
PROT UR STRIP.AUTO-MCNC: ABNORMAL MG/DL
RBC # BLD AUTO: 2.38 X10*6/UL (ref 4–5.2)
RBC # UR STRIP.AUTO: ABNORMAL /UL
RBC #/AREA URNS AUTO: >20 /HPF
SODIUM SERPL-SCNC: 129 MMOL/L (ref 136–145)
SODIUM SERPL-SCNC: 129 MMOL/L (ref 136–145)
SP GR UR STRIP.AUTO: 1.01
URATE SERPL-MCNC: 6.4 MG/DL (ref 2.3–6.7)
UROBILINOGEN UR STRIP.AUTO-MCNC: NORMAL MG/DL
WBC # BLD AUTO: 21 X10*3/UL (ref 4.4–11.3)
WBC #/AREA URNS AUTO: ABNORMAL /HPF

## 2025-01-15 PROCEDURE — 87040 BLOOD CULTURE FOR BACTERIA: CPT | Performed by: STUDENT IN AN ORGANIZED HEALTH CARE EDUCATION/TRAINING PROGRAM

## 2025-01-15 PROCEDURE — 84100 ASSAY OF PHOSPHORUS: CPT

## 2025-01-15 PROCEDURE — 82248 BILIRUBIN DIRECT: CPT

## 2025-01-15 PROCEDURE — 71045 X-RAY EXAM CHEST 1 VIEW: CPT | Performed by: RADIOLOGY

## 2025-01-15 PROCEDURE — 36415 COLL VENOUS BLD VENIPUNCTURE: CPT | Performed by: STUDENT IN AN ORGANIZED HEALTH CARE EDUCATION/TRAINING PROGRAM

## 2025-01-15 PROCEDURE — 2500000001 HC RX 250 WO HCPCS SELF ADMINISTERED DRUGS (ALT 637 FOR MEDICARE OP)

## 2025-01-15 PROCEDURE — 80053 COMPREHEN METABOLIC PANEL: CPT

## 2025-01-15 PROCEDURE — 82728 ASSAY OF FERRITIN: CPT

## 2025-01-15 PROCEDURE — 83615 LACTATE (LD) (LDH) ENZYME: CPT

## 2025-01-15 PROCEDURE — 71045 X-RAY EXAM CHEST 1 VIEW: CPT

## 2025-01-15 PROCEDURE — 2500000001 HC RX 250 WO HCPCS SELF ADMINISTERED DRUGS (ALT 637 FOR MEDICARE OP): Performed by: NURSE PRACTITIONER

## 2025-01-15 PROCEDURE — 99233 SBSQ HOSP IP/OBS HIGH 50: CPT | Performed by: STUDENT IN AN ORGANIZED HEALTH CARE EDUCATION/TRAINING PROGRAM

## 2025-01-15 PROCEDURE — 85025 COMPLETE CBC W/AUTO DIFF WBC: CPT

## 2025-01-15 PROCEDURE — 2500000004 HC RX 250 GENERAL PHARMACY W/ HCPCS (ALT 636 FOR OP/ED): Performed by: NURSE PRACTITIONER

## 2025-01-15 PROCEDURE — 2500000002 HC RX 250 W HCPCS SELF ADMINISTERED DRUGS (ALT 637 FOR MEDICARE OP, ALT 636 FOR OP/ED)

## 2025-01-15 PROCEDURE — 99223 1ST HOSP IP/OBS HIGH 75: CPT | Performed by: INTERNAL MEDICINE

## 2025-01-15 PROCEDURE — 92526 ORAL FUNCTION THERAPY: CPT | Mod: GN

## 2025-01-15 PROCEDURE — 84100 ASSAY OF PHOSPHORUS: CPT | Performed by: NURSE PRACTITIONER

## 2025-01-15 PROCEDURE — 83735 ASSAY OF MAGNESIUM: CPT | Performed by: NURSE PRACTITIONER

## 2025-01-15 PROCEDURE — 83735 ASSAY OF MAGNESIUM: CPT

## 2025-01-15 PROCEDURE — 81001 URINALYSIS AUTO W/SCOPE: CPT | Performed by: STUDENT IN AN ORGANIZED HEALTH CARE EDUCATION/TRAINING PROGRAM

## 2025-01-15 PROCEDURE — 84550 ASSAY OF BLOOD/URIC ACID: CPT

## 2025-01-15 PROCEDURE — 86140 C-REACTIVE PROTEIN: CPT

## 2025-01-15 PROCEDURE — 1170000001 HC PRIVATE ONCOLOGY ROOM DAILY

## 2025-01-15 PROCEDURE — 85384 FIBRINOGEN ACTIVITY: CPT

## 2025-01-15 PROCEDURE — 2500000004 HC RX 250 GENERAL PHARMACY W/ HCPCS (ALT 636 FOR OP/ED)

## 2025-01-15 RX ORDER — ONDANSETRON HYDROCHLORIDE 2 MG/ML
4 INJECTION, SOLUTION INTRAVENOUS EVERY 8 HOURS PRN
Status: DISCONTINUED | OUTPATIENT
Start: 2025-01-15 | End: 2025-01-22 | Stop reason: HOSPADM

## 2025-01-15 RX ORDER — HYDROXYZINE HYDROCHLORIDE 25 MG/1
25 TABLET, FILM COATED ORAL ONCE
Status: COMPLETED | OUTPATIENT
Start: 2025-01-15 | End: 2025-01-15

## 2025-01-15 RX ADMIN — APIXABAN 5 MG: 5 TABLET, FILM COATED ORAL at 20:22

## 2025-01-15 RX ADMIN — BUSPIRONE HYDROCHLORIDE 7.5 MG: 15 TABLET ORAL at 20:22

## 2025-01-15 RX ADMIN — APIXABAN 5 MG: 5 TABLET, FILM COATED ORAL at 08:27

## 2025-01-15 RX ADMIN — MAGNESIUM OXIDE TAB 400 MG (241.3 MG ELEMENTAL MG) 400 MG: 400 (241.3 MG) TAB at 19:09

## 2025-01-15 RX ADMIN — AMIODARONE HYDROCHLORIDE 200 MG: 200 TABLET ORAL at 08:27

## 2025-01-15 RX ADMIN — BUSPIRONE HYDROCHLORIDE 7.5 MG: 15 TABLET ORAL at 19:09

## 2025-01-15 RX ADMIN — METOPROLOL SUCCINATE 100 MG: 50 TABLET, EXTENDED RELEASE ORAL at 08:27

## 2025-01-15 RX ADMIN — DULOXETINE HYDROCHLORIDE 20 MG: 20 CAPSULE, DELAYED RELEASE ORAL at 08:26

## 2025-01-15 RX ADMIN — BUSPIRONE HYDROCHLORIDE 7.5 MG: 15 TABLET ORAL at 08:26

## 2025-01-15 RX ADMIN — ALLOPURINOL 300 MG: 300 TABLET ORAL at 08:27

## 2025-01-15 RX ADMIN — ACYCLOVIR 400 MG: 400 TABLET ORAL at 20:22

## 2025-01-15 RX ADMIN — ROPINIROLE 0.5 MG: 0.5 TABLET, FILM COATED ORAL at 20:22

## 2025-01-15 RX ADMIN — PANTOPRAZOLE SODIUM 40 MG: 40 TABLET, DELAYED RELEASE ORAL at 08:31

## 2025-01-15 RX ADMIN — AMLODIPINE BESYLATE 5 MG: 5 TABLET ORAL at 08:27

## 2025-01-15 RX ADMIN — METHIMAZOLE 2.5 MG: 5 TABLET ORAL at 08:27

## 2025-01-15 RX ADMIN — FUROSEMIDE 40 MG: 10 INJECTION, SOLUTION INTRAVENOUS at 05:26

## 2025-01-15 RX ADMIN — OXYCODONE HYDROCHLORIDE 10 MG: 5 TABLET ORAL at 21:55

## 2025-01-15 RX ADMIN — ACYCLOVIR 400 MG: 400 TABLET ORAL at 08:27

## 2025-01-15 RX ADMIN — FLUCONAZOLE 400 MG: 200 TABLET ORAL at 08:27

## 2025-01-15 RX ADMIN — HYDROXYZINE HYDROCHLORIDE 25 MG: 25 TABLET ORAL at 22:31

## 2025-01-15 ASSESSMENT — COGNITIVE AND FUNCTIONAL STATUS - GENERAL
TOILETING: A LOT
CLIMB 3 TO 5 STEPS WITH RAILING: A LOT
STANDING UP FROM CHAIR USING ARMS: A LOT
STANDING UP FROM CHAIR USING ARMS: A LOT
DRESSING REGULAR LOWER BODY CLOTHING: A LOT
EATING MEALS: A LOT
PERSONAL GROOMING: A LOT
HELP NEEDED FOR BATHING: A LOT
WALKING IN HOSPITAL ROOM: A LOT
MOVING FROM LYING ON BACK TO SITTING ON SIDE OF FLAT BED WITH BEDRAILS: A LITTLE
TURNING FROM BACK TO SIDE WHILE IN FLAT BAD: A LOT
TOILETING: A LOT
DRESSING REGULAR UPPER BODY CLOTHING: A LOT
MOBILITY SCORE: 13
HELP NEEDED FOR BATHING: A LOT
MOVING FROM LYING ON BACK TO SITTING ON SIDE OF FLAT BED WITH BEDRAILS: A LITTLE
PERSONAL GROOMING: A LOT
DAILY ACTIVITIY SCORE: 12
MOVING TO AND FROM BED TO CHAIR: A LOT
DAILY ACTIVITIY SCORE: 12
TURNING FROM BACK TO SIDE WHILE IN FLAT BAD: A LOT
MOVING TO AND FROM BED TO CHAIR: A LOT
DRESSING REGULAR LOWER BODY CLOTHING: A LOT
MOBILITY SCORE: 13
CLIMB 3 TO 5 STEPS WITH RAILING: A LOT
WALKING IN HOSPITAL ROOM: A LOT
EATING MEALS: A LOT
DRESSING REGULAR UPPER BODY CLOTHING: A LOT

## 2025-01-15 ASSESSMENT — PAIN SCALES - GENERAL
PAINLEVEL_OUTOF10: 5 - MODERATE PAIN
PAINLEVEL_OUTOF10: 5 - MODERATE PAIN
PAINLEVEL_OUTOF10: 0 - NO PAIN

## 2025-01-15 ASSESSMENT — PAIN - FUNCTIONAL ASSESSMENT
PAIN_FUNCTIONAL_ASSESSMENT: 0-10

## 2025-01-15 NOTE — CARE PLAN
The patient's goals for the shift include      The clinical goals for the shift include pt to remain hds        Problem: Respiratory  Goal: Clear secretions with interventions this shift  Outcome: Progressing  Goal: Minimize anxiety/maximize coping throughout shift  Outcome: Progressing  Goal: Minimal/no exertional discomfort or dyspnea this shift  Outcome: Progressing  Goal: No signs of respiratory distress (eg. Use of accessory muscles. Peds grunting)  Outcome: Progressing  Goal: Patent airway maintained this shift  Outcome: Progressing  Goal: Tolerate mechanical ventilation evidenced by VS/agitation level this shift  Outcome: Progressing  Goal: Tolerate pulmonary toileting this shift  Outcome: Progressing  Goal: Verbalize decreased shortness of breath this shift  Outcome: Progressing  Goal: Wean oxygen to maintain O2 saturation per order/standard this shift  Outcome: Progressing  Goal: Increase self care and/or family involvement in next 24 hours  Outcome: Progressing     Problem: Fall/Injury  Goal: Not fall by end of shift  Outcome: Progressing  Goal: Be free from injury by end of the shift  Outcome: Progressing  Goal: Verbalize understanding of personal risk factors for fall in the hospital  Outcome: Progressing  Goal: Verbalize understanding of risk factor reduction measures to prevent injury from fall in the home  Outcome: Progressing  Goal: Use assistive devices by end of the shift  Outcome: Progressing  Goal: Pace activities to prevent fatigue by end of the shift  Outcome: Progressing     Problem: Infection related to problem list condition  Goal: Infection will resolve through treatment  Outcome: Progressing     Problem: Skin  Goal: Decreased wound size/increased tissue granulation at next dressing change  Outcome: Progressing  Goal: Participates in plan/prevention/treatment measures  Outcome: Progressing  Goal: Prevent/manage excess moisture  Outcome: Progressing  Goal: Prevent/minimize sheer/friction  injuries  Outcome: Progressing  Goal: Promote/optimize nutrition  Outcome: Progressing  Goal: Promote skin healing  Outcome: Progressing     Problem: Pain  Goal: Takes deep breaths with improved pain control throughout the shift  Outcome: Progressing  Goal: Turns in bed with improved pain control throughout the shift  Outcome: Progressing  Goal: Walks with improved pain control throughout the shift  Outcome: Progressing  Goal: Performs ADL's with improved pain control throughout shift  Outcome: Progressing  Goal: Participates in PT with improved pain control throughout the shift  Outcome: Progressing  Goal: Free from opioid side effects throughout the shift  Outcome: Progressing  Goal: Free from acute confusion related to pain meds throughout the shift  Outcome: Progressing     Problem: Pain - Adult  Goal: Verbalizes/displays adequate comfort level or baseline comfort level  Outcome: Progressing     Problem: Safety - Adult  Goal: Free from fall injury  Outcome: Progressing     Problem: Discharge Planning  Goal: Discharge to home or other facility with appropriate resources  Outcome: Progressing     Problem: Chronic Conditions and Co-morbidities  Goal: Patient's chronic conditions and co-morbidity symptoms are monitored and maintained or improved  Outcome: Progressing

## 2025-01-15 NOTE — PROGRESS NOTES
Simi Sandoval is a 89 y.o. female on day 7 of admission presenting with DLBCL (diffuse large B cell lymphoma).    Subjective   Patient this AM remains letheragic. Responds to voice and conversant. She reports feeling confused. No fever or chills. Mild abd pain. Had an episode of emesis this AM with her pills in applesauce. Speech evaluation performed and ok for modified diet as she is unable to wear her dentures at this time due to lack of denture adhesive.     ICE score this morning is 9/10.      TTE yesterday showed severe AI and MR. IV diuresis ongoing. Recorded out net negative 590cc yesterday.  Extremities remain edematous.         Objective     Physical Exam:  General: elderly female, appears chronically ill, lethargic but awakens to voice and conversant   HEENT: normocephalic, atraumatic, EOMI, no scleral icterus  CV: RRR, systolic murmur  Pulm: crackles bilaterally, wearing venti mask, no increased work of breathing   Abd: soft, tender to palpation diffusely, no rebound or guarding, non distended  : no maki   Ext: warm, 2+ bilateral lower extremity edema  Skin: no rashes      Last Recorded Vitals  Vitals:    01/15/25 1158   BP: 115/68   Pulse: 79   Resp: 18   Temp: 36.4 °C (97.5 °F)   SpO2: 94%     Results for orders placed or performed during the hospital encounter of 01/08/25 (from the past 24 hours)   Transthoracic Echo (TTE) Limited   Result Value Ref Range    AV pk sharon 2.04 m/s    AV mn grad 8 mmHg    LVOT diam 1.76 cm    MV E/A ratio 2.14     Tricuspid annular plane systolic excursion 2.3 cm    LV EF 70 %    RV free wall pk S' 14.00 cm/s    LVIDd 5.12 cm    RVSP 54.8 mmHg    Aortic Valve Area by Continuity of VTI 1.29 cm2    Aortic Valve Area by Continuity of Peak Velocity 1.25 cm2    AV pk grad 17 mmHg    LV A4C EF 66.1    Renal function panel   Result Value Ref Range    Glucose 125 (H) 74 - 99 mg/dL    Sodium 128 (L) 136 - 145 mmol/L    Potassium 4.5 3.5 - 5.3 mmol/L    Chloride 92 (L) 98 -  107 mmol/L    Bicarbonate 27 21 - 32 mmol/L    Anion Gap 14 10 - 20 mmol/L    Urea Nitrogen 50 (H) 6 - 23 mg/dL    Creatinine 1.39 (H) 0.50 - 1.05 mg/dL    eGFR 37 (L) >60 mL/min/1.73m*2    Calcium 9.3 8.6 - 10.6 mg/dL    Phosphorus 3.2 2.5 - 4.9 mg/dL    Albumin 2.7 (L) 3.4 - 5.0 g/dL   Magnesium   Result Value Ref Range    Magnesium 2.77 (H) 1.60 - 2.40 mg/dL   Lactate   Result Value Ref Range    Lactate 1.4 0.4 - 2.0 mmol/L   B-type natriuretic peptide   Result Value Ref Range    BNP 1,112 (H) 0 - 99 pg/mL   Type And Screen   Result Value Ref Range    ABO TYPE O     Rh TYPE POS     ANTIBODY SCREEN NEG    CBC and Auto Differential   Result Value Ref Range    WBC 21.0 (H) 4.4 - 11.3 x10*3/uL    nRBC 0.1 (H) 0.0 - 0.0 /100 WBCs    RBC 2.38 (L) 4.00 - 5.20 x10*6/uL    Hemoglobin 9.4 (L) 12.0 - 16.0 g/dL    Hematocrit 23.4 (L) 36.0 - 46.0 %    MCV 98 80 - 100 fL    MCH 39.5 (H) 26.0 - 34.0 pg    MCHC 40.2 (H) 32.0 - 36.0 g/dL    RDW 18.7 (H) 11.5 - 14.5 %    Platelets 417 150 - 450 x10*3/uL    Neutrophils % 93.3 40.0 - 80.0 %    Immature Granulocytes %, Automated 1.3 (H) 0.0 - 0.9 %    Lymphocytes % 0.8 13.0 - 44.0 %    Monocytes % 4.2 2.0 - 10.0 %    Eosinophils % 0.2 0.0 - 6.0 %    Basophils % 0.2 0.0 - 2.0 %    Neutrophils Absolute 19.54 (H) 1.60 - 5.50 x10*3/uL    Immature Granulocytes Absolute, Automated 0.28 0.00 - 0.50 x10*3/uL    Lymphocytes Absolute 0.16 (L) 0.80 - 3.00 x10*3/uL    Monocytes Absolute 0.89 (H) 0.05 - 0.80 x10*3/uL    Eosinophils Absolute 0.05 0.00 - 0.40 x10*3/uL    Basophils Absolute 0.04 0.00 - 0.10 x10*3/uL   Comprehensive Metabolic Panel   Result Value Ref Range    Glucose 123 (H) 74 - 99 mg/dL    Sodium 129 (L) 136 - 145 mmol/L    Potassium 4.3 3.5 - 5.3 mmol/L    Chloride 93 (L) 98 - 107 mmol/L    Bicarbonate 28 21 - 32 mmol/L    Anion Gap 12 10 - 20 mmol/L    Urea Nitrogen 54 (H) 6 - 23 mg/dL    Creatinine 1.45 (H) 0.50 - 1.05 mg/dL    eGFR 35 (L) >60 mL/min/1.73m*2    Calcium 9.2 8.6 -  10.6 mg/dL    Albumin 2.6 (L) 3.4 - 5.0 g/dL    Alkaline Phosphatase 77 33 - 136 U/L    Total Protein 4.5 (L) 6.4 - 8.2 g/dL    AST 18 9 - 39 U/L    Bilirubin, Total 0.5 0.0 - 1.2 mg/dL    ALT 22 7 - 45 U/L   Magnesium   Result Value Ref Range    Magnesium 2.98 (H) 1.60 - 2.40 mg/dL   Phosphorus   Result Value Ref Range    Phosphorus 3.5 2.5 - 4.9 mg/dL   C-Reactive Protein   Result Value Ref Range    C-Reactive Protein 0.42 <1.00 mg/dL   Ferritin   Result Value Ref Range    Ferritin 423 (H) 8 - 150 ng/mL   Fibrinogen   Result Value Ref Range    Fibrinogen 283 200 - 400 mg/dL   Lactate Dehydrogenase   Result Value Ref Range     (H) 84 - 246 U/L   Uric Acid   Result Value Ref Range    Uric Acid 6.4 2.3 - 6.7 mg/dL   Bilirubin, Direct   Result Value Ref Range    Bilirubin, Direct 0.2 0.0 - 0.3 mg/dL   Blood Culture    Specimen: Peripheral Venipuncture; Blood culture   Result Value Ref Range    Blood Culture Loaded on Instrument - Culture in progress      *Note: Due to a large number of results and/or encounters for the requested time period, some results have not been displayed. A complete set of results can be found in Results Review.      Intake/Output last 3 Shifts:  I/O last 3 completed shifts:  In: 60 (0.9 mL/kg) [P.O.:60]  Out: 1850 (26.7 mL/kg) [Urine:1850 (0.7 mL/kg/hr)]  Weight: 69.4 kg     Scheduled medications  acyclovir, 400 mg, oral, q12h VERONICA  allopurinol, 300 mg, oral, Daily  amiodarone, 200 mg, oral, Daily  amLODIPine, 5 mg, oral, Daily  apixaban, 5 mg, oral, BID  busPIRone, 7.5 mg, oral, TID  DULoxetine, 20 mg, oral, Daily  fluconazole, 400 mg, oral, Daily  furosemide, 40 mg, intravenous, q12h  lidocaine, 1 patch, transdermal, Daily  magnesium oxide, 400 mg, oral, q12h VERONICA  methIMAzole, 2.5 mg, oral, Daily  metoprolol succinate XL, 100 mg, oral, Daily  pantoprazole, 40 mg, oral, Daily before breakfast  rOPINIRole, 0.5 mg, oral, Nightly      Continuous medications     PRN medications  PRN  medications: albuterol, alteplase, dextrose, diphenhydrAMINE, EPINEPHrine HCl, famotidine, ipratropium-albuteroL, melatonin, methocarbamol, methylPREDNISolone sodium succinate (PF), moisturizing mouth, ondansetron, oxyCODONE, prochlorperazine, prochlorperazine, sodium chloride       Assessment & Plan  DLBCL (diffuse large B cell lymphoma)    Simi Sandoval is an 89-year-old F with PMH DLBCL of left breast, A-fib (on Eliquis), pulmonary hypertension, Hysterectomy, Thyroid disease,  CHF, NSTEMI, s/p uretral stent (12/13) who comes in for evaluation of left flank pain, left  sided abdominal pain and diarrhea at OSH on 12/27. Colonoscopy completed 12/30 and was found to have Cdiff colitis with pancolitis. Pt was started on Vanco for Pancolitis and was treated with zosyn for aspiration pneumonia seen on CT A/P S/P colonoscopy. CT imaging found multiple pulmonary nodules bilaterally, largest dependently on the left measuring 2.6 cm. Biopsy of left lung nodule done 12/11/2024 showed diffuse aggressive large B-cell lymphoma, recurrent by history. Heme/Onc saw the pt at OSH and recommended to transfer to [facility] for further work up, evaluation and treatment of disease recurrence. She was started on epcoritamab ramp up, however, course complicated by acute decompensated heart failure in the setting of severe MR and AI and encephalopathy.     Active Issues 01/15/25  #Acute decompensated heart failure in the setting of severe MR and AI; consulted HF. Continuing IV diuresis   #Encephalopathy: will obtain blood and urine cultures. Suspect driven by other medical co-morbidities rather than ICANS. ICE score 9/10 (lost point due to pt unable to hold pen to write a sentence)   #DLBCL; day 6 of epcoritamab, cont allopurinol, TLS/CRS labs appear grossly stable    #Hyponatremia; likely 2/2 hypervolemic hyponatremia. Ongoing diuresis, salt tabs discontinued   #Emesis: swallow evaluation performed today. Patient unable to wear her  dentures and modified diet (minced/moist) placed     ONC   #Diffuse large B-cell lymphoma   - Last treated with Tafasitamab 5/16/2023   - New lung nodules favoring recurrence of lymphoma back in December.    - last chemo in 2023   - Pathology from lung nodule biopsy 12/11/2024 confirmed disease recurrence .  - CT Flank 12/27/2024 shows disease progression in pulmonary nodules and new cortical lytic lesions in L4-5.   - S/p Dexamethasone 4 mg BID at OSH by Heme/Onc and ordered MRI per Oncology at OSH on admission to rule out cord compression.  - MRI 1/9: negative for cord compression, soft tissue masses involving the paraspinal musculature, left greater than right with involvement of the left lateral epidural space at the L5 level resulting in moderate spinal canal stenosis  - Continue C1 epcoritamab (D1: 1/10/25). Today is Day 6. daily CRS labs, daily TLS labs    HEME  #Leukocytosis  -WBC 25.4 on admit  -most likely secondary to colitis vs. Dex  #Anemia  -Hgb 10 on admit  -Transfuse if Hgb<7  -Monitor CBC daily    CARDS   - LVEF 57%. Vegetation noted on prior echo.  # chronic HFpEF    - C/w BB, strict I and O's   -ECHO (1/14):  EF normal, Severe MVR,  Severely dilated LA, Moderate  to severe AVR, compared to prior exams  new severe MR  and AI   - Consulted HF (1/14)  40mg lasix BID IV, maintain K+>4, Mag >2, strict I/o , isma;y weights    # Paroxysmal Afib    - Continue home amiodarone, metoprolol XR, and ppx Eliquis  # Hypertension   - Continue amlodipine    PULM  #Acute hypoxic Resp failure    #Aspiration Pneumonia  #ADHF  - Doesn't wear O2 at baseline, wean as tolerated  - Patient likely with aspiration pneumonia status post colonoscopy on 12/30/2024.  She was supine laying on her left side which is where the infiltrate was shown on chest x-ray.  Concern for aspiration during procedure  - S/p 7 day course of Zosyn at OSH  - CXR on admission shows with mild-to-moderate degree of pulmonary interstitial edema. Small  left and right pleural effusion  - S/p bipap for acute hypoxic respiratory failure at OSH  - S/p intubation in 11/2024 for pneumonia and CHF exacerbation  - Repeat CXR (1/11) with pulmonary congestion, small left pleural effusion  - ongoing diuresis with BID IV lasix 40mg      FEN/GI  Admit wt: 64 kg, current wt: 63.4 kg  F: PRN  E: PRN  N: low path diet minced and moist with thin liquids, 1L fluid restriction.  #GURINDER  -Cr baseline 0.9, now uptrended to 1.45. Found to have acute urinary retention 1/15. Ddx includes obstructive GURINDER, cardiorenal    -maki to be placed   # Hypercalcemia, improving  - sCa 12.6 on admit, 1/10: 12.4, 1/12: 10.5  - Increase mIVF to 100ml/hr, stopped fluids 1/10 with consideration of hx of CHF  - Zometa 4mg x1 (1/9)  - Calcitonin BID x2 doses (1/10) -- repeat 2 doses on 1/1  #Hyponatremia  :: 2/2 fluid volume overload  - Initiate fluid restriction of 1L (1/12)  - Restart home NaCl tabs 1g BID, Dc'd  on 1/14   due to possibly being hypervolemic  hyponatremia , may improve with diuresising per HF,  continue to monitor   - Monitor daily  - Sent Urine  electrolytes , spot Na ,studies pending (1/13)      URO  # Left hydronephrosis, Urinary Retention s/p Left ureteral stent placed on 12/13/24   - Seen by Urology at OSH  - S/p maki removed on 1/5   #Acute urinary retention (1/15)  - order for maki      ID  # C. diff colitis with pan colitis, resolved   - CT A/P shows mild pancolitis on distal rectosigmoid and pseudomembranes colitis on Colonoscopy (12/30)   - S/p PO Vanco at OSH and completed po vancomycin to 125mg qid on admit with last doses 1/13/25.   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 She denies any increase or change in her neuropathy symptoms. Per Neurology at OSH  - Continue supportive measures    - No further neurological testing needed at this point      MSK   # Lumbar Spinal  stenosis    - Started on Oxy 5 mg PRN and cont with Robaxin 500 mg TID PRN -- will cautiously increase oxy as of 1/11       PSYCH   # Anxiety   - C/w Buspar 7.5 mg TID and Cymbalta  -consult Supportive Onc (they will see Monday)      ENDO   # Hyperthyroidism   - C/w methimazole      Dispo  - Full code  - R Med port  - Primary Oncologist Dr Mac    Patient seen, examined, and discussed with Dr. Brannon Adair MD

## 2025-01-15 NOTE — PROGRESS NOTES
"Advanced Heart Failure Progress Note   Consulting Team: onc  Reason for Consult: pulm edema, new echo findings of JENNIFER LAZAR    Subjective   Transitioned to 3L NC this AM from venti mask  SLP at bedside and she was following commands  Reports her breathing is \"okay\"  Denies chest pain    Objective   Physical Exam  Vitals:    01/15/25 1555   BP: 103/62   Pulse: 58   Resp: 18   Temp: 36.2 °C (97.2 °F)   SpO2: 92%     EN: ill appearing,  In no acute distress  CV: systolic 3/6 murmur+  LUNGS: Crackles b/l  ABD: Soft, NT/ND,  EXT: cool extremities, 2-3+ pitting edema b/l both lower and upper extemity  NEURO: awake, alert, following commands    I have personally reviewed the following images and laboratory findings:      Results for orders placed during the hospital encounter of 01/08/25    Transthoracic Echo (TTE) Limited    [facility], [address] [place] [address]  Tel [phone] and Fax [phone]    TRANSTHORACIC ECHOCARDIOGRAM REPORT      Patient Name:       LEXUS BETTS DEANDRE Reading Physician:    [id number] Marcia Kent MD  Study Date:         1/14/2025           Ordering Provider:    [id number] PATO MORILLO  MRN/PID:            [id number]            Fellow:  Accession#:         [id number]        Nurse:  Date of Birth/Age:  1935 / 89      Sonographer:          Yuliya aragon                                     RDCS  Gender assigned at  F                   Additional Staff:  Birth:  Height:             157.48 cm           Admit Date:           1/8/2025  Weight:             63.05 kg            Admission Status:     Inpatient - STAT  BSA / BMI:          1.64 m2 / 25.42     Encounter#:           [id number]  kg/m2  Blood Pressure:     99/59 mmHg          Department Location:  [place] [address]    Study Type:    TRANSTHORACIC ECHO (TTE) LIMITED  Diagnosis/ICD: Unspecified atrial fibrillation-I48.91  Indication:    Fluid overload  CPT Code:      Echo Limited-51464; Doppler " Limited-06487; Color Doppler-46141    Patient History:  Pertinent History: DLBCL of left breast; A-Fib; PHTN; CHF; NSTEMI; Pulmonary  nodules; 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    Study Detail: The following Echo studies were performed: 2D, M-Mode, Doppler and  color flow. Technically challenging study due to body habitus and  patient lying in supine position.      Critical Event  Critical Event: Test was completed as per department protocol.  Critical Finding: Possible severe MR.  Time Test was Completed: 12:30:43 PM  Notified: Dr. Kent.  Attending notification time: 12:33:44 PM    PHYSICIAN INTERPRETATION:  Left Ventricle: Left ventricular ejection fraction is normal, calculated by Guerrero's biplane at 70%. There are no regional left ventricular wall motion abnormalities. The left ventricular cavity size is normal. There is normal septal and normal posterior left ventricular wall thickness. Left ventricular diastolic filling cannot be determined, due to severe mitral regurgitation.  Left Atrium: The left atrium is severely dilated.  Right Ventricle: The right ventricle is normal in size. There is normal right ventricular global systolic function.  Right Atrium: The right atrium is normal in size.  Aortic Valve: The aortic valve is trileaflet. There is mild to moderate aortic valve cusp calcification. There is mild to moderate aortic valve thickening. The aortic valve dimensionless index is 0.53. There is moderate to severe aortic valve regurgitation. The peak instantaneous gradient of the aortic valve is 17 mmHg. The mean gradient of the aortic valve is 8 mmHg.  Mitral Valve: The mitral valve is mildly thickened. There is moderate mitral annular calcification. There is severe mitral valve regurgitation which is posteriorly directed. The mitral regurgitant orifice area is 37 mm2. The mitral regurgitant volume is 57.60 ml. There is flow reversal in the right pulmonary vein. Mildly thickened MV  leaflets with relatively restricted leaflet closure ( posterior leaflet restriction is greater than anterior with annular dilatation from severely dilated LA causing severe posteriorly directed MR.  Tricuspid Valve: The tricuspid valve is structurally normal. There is moderate tricuspid regurgitation. The Doppler estimated RVSP is moderately elevated at 54.8 mmHg.  Pulmonic Valve: The pulmonic valve is structurally normal. Pulmonic valve regurgitation was not assessed.  Pericardium: Trivial pericardial effusion.  Pleural: There is left pleural effusion.  Aorta: The aortic root is normal.  Systemic Veins: The inferior vena cava appears normal in size, with IVC inspiratory collapse less than 50%.  In comparison to the previous echocardiogram(s): Compared with the prior exam at  from 3/15/2018 the severe MR and AI are new. Prior study with only mild MR and no AI. The LV systolic function remains preserved. Report from echo at [facility] in December ( no images available ) reported moderate to severe AI, normal LV systolic function and no mention of MR.      CONCLUSIONS:  1. Left ventricular ejection fraction is normal, calculated by Guerrero's biplane at 70%.  2. Left ventricular diastolic filling cannot be determined, due to severe mitral regurgitation.  3. There is normal right ventricular global systolic function.  4. The left atrium is severely dilated.  5. Mildly thickened MV leaflets with relatively restricted leaflet closure ( posterior leaflet restriction is greater than anterior with annular dilatation from severely dilated LA causing severe posteriorly directed MR.  6. There is moderate mitral annular calcification.  7. Severe mitral valve regurgitation.  8. Moderate tricuspid regurgitation visualized.  9. Moderately elevated right ventricular systolic pressure.  10. Moderate to severe aortic valve regurgitation.  11. The inferior vena cava appears normal in size, with IVC inspiratory collapse less than  50%.  12. Ordering provider notified of findings at the time of reporting.  13. Compared with the prior exam at  from 3/15/2018 the severe MR and AI are new. Prior study with only mild MR and no AI. The LV systolic function remains preserved. Report from echo at [facility] in December ( no images available ) reported moderate to severe AI, normal LV systolic function and no mention of MR.    RECOMMENDATIONS:  Utilizing an FDA cleared automated machine learning algorithm (EchoGo Heart Failure by [employer]), the analysis of the apical 4-chamber echocardiogram suggests the presence of heart failure with preserved ejection fraction (HFpEF)*. Clinical correlation looking for additional heart failure signs and symptoms is recommended, as a definite diagnosis of heart failure cannot be made by imaging alone.  *Per ACC/AHA/HFSA universal diagnosis of heart failure, HFpEF is defined as 1) signs and symptoms leading to clinical diagnosis of heart failure, 2) an ejection fraction of at least 50%, and 3) evidence of elevated intra-cardiac filling pressures by echocardiography, BNP elevation, or catheterization.    QUANTITATIVE DATA SUMMARY:    2D MEASUREMENTS:          Normal Ranges:  Ao Root d:       3.00 cm  (2.0-3.7cm)  LAs:             4.90 cm  (2.7-4.0cm)  IVSd:            0.77 cm  (0.6-1.1cm)  LVPWd:           0.83 cm  (0.6-1.1cm)  LVIDd:           5.12 cm  (3.9-5.9cm)  LVIDs:           3.23 cm  LV Mass Index:   86 g/m2  LVEDV Index:     48 ml/m2  LV % FS          37.0 %      LA VOLUME:                  Normal Ranges:  LA Volume Index: 66.8 ml/m2      RA VOLUME BY A/L METHOD:          Normal Ranges:  RA Area A4C:             16.8 cm2      AORTA MEASUREMENTS:         Normal Ranges:  Ao Sinus, d:        3.00 cm (2.1-3.5cm)  Asc Ao, d:          3.20 cm (2.1-3.4cm)      LV SYSTOLIC FUNCTION BY 2D PLANIMETRY (MOD):  Normal Ranges:  EF-A4C View:    66 % (>=55%)  EF-A2C View:    72 %  EF-Biplane:     70 %  LV EF Reported: 70  %      LV DIASTOLIC FUNCTION:             Normal Ranges:  MV Peak E:             1.15 m/s    (0.7-1.2 m/s)  MV Peak A:             0.54 m/s    (0.42-0.7 m/s)  E/A Ratio:             2.14        (1.0-2.2)  MV A Dur:              121.79 msec  MV DT:                 132 msec    (150-240 msec)  PulmV Sys Juan Carlos:         40.96 cm/s  PulmV Cazares Juan Carlos:        74.11 cm/s  PulmV S/D Juan Carlos:         0.55  PulmV A Revs Juan Carlos:      27.80 cm/s  PulmV A Revs Dur:      154.14 msec      MITRAL INSUFFICIENCY:             Normal Ranges:  PISA Radius:          1.0 cm  MR VTI:               155.40 cm  MR Vmax:              459.00 cm/s  MR Alias Juan Carlos:         30.0 cm/s  MR Volume:            57.60 ml  MR Flow Rt:           170.12 ml/s  MR EROA:              37 mm2      AORTIC VALVE:                      Normal Ranges:  AoV Vmax:                2.04 m/s  (<=1.7m/s)  AoV Peak P.7 mmHg (<20mmHg)  AoV Mean P.6 mmHg  (1.7-11.5mmHg)  LVOT Max Juan Carlos:            1.05 m/s  (<=1.1m/s)  AoV VTI:                 42.64 cm  (18-25cm)  LVOT VTI:                22.53 cm  LVOT Diameter:           1.76 cm   (1.8-2.4cm)  AoV Area, VTI:           1.29 cm2  (2.5-5.5cm2)  AoV Area,Vmax:           1.25 cm2  (2.5-4.5cm2)  AoV Dimensionless Index: 0.53      AORTIC INSUFFICIENCY:  AI Vmax:       3.96 m/s  AI Half-time:  348 msec  AI Decel Time: 1201 msec  AI Decel Rate: 330.79 cm/s2      RIGHT VENTRICLE:  RV Basal 3.70 cm  RV Mid   2.30 cm  RV Major 5.7 cm  TAPSE:   23.0 mm  RV s'    0.14 m/s      TRICUSPID VALVE/RVSP:          Normal Ranges:  Peak TR Velocity:     3.42 m/s  RV Syst Pressure:     55 mmHg  (< 30mmHg)  IVC Diam:             1.90 cm      Pulmonary Veins:  PulmV A Revs Dur: 154.14 msec  PulmV A Revs Juan Carlos: 27.80 cm/s  PulmV Cazares Juan Carlos:   74.11 cm/s  PulmV S/D Juan Carlos:    0.55  PulmV Sys Juan Carlos:    40.96 cm/s      AORTA:  Asc Ao Diam 3.19 cm      [id number] Marcia Kent MD  Electronically signed on 2025 at 2:33:35 PM        ** Final **          Lab Review   Lab Results   Component Value Date     (L) 01/15/2025     (L) 01/14/2025     (L) 01/14/2025    K 4.3 01/15/2025    K 4.5 01/14/2025    K 4.9 01/14/2025    CO2 28 01/15/2025    CO2 27 01/14/2025    CO2 26 01/14/2025    BUN 54 (H) 01/15/2025    BUN 50 (H) 01/14/2025    BUN 45 (H) 01/14/2025    CREATININE 1.45 (H) 01/15/2025    CREATININE 1.39 (H) 01/14/2025    CREATININE 1.38 (H) 01/14/2025    GLUCOSE 123 (H) 01/15/2025    GLUCOSE 125 (H) 01/14/2025    GLUCOSE 125 (H) 01/14/2025    CALCIUM 9.2 01/15/2025    CALCIUM 9.3 01/14/2025    CALCIUM 9.6 01/14/2025     Lab Results   Component Value Date    WBC 21.0 (H) 01/15/2025    HGB 9.4 (L) 01/15/2025    HCT 23.4 (L) 01/15/2025    MCV 98 01/15/2025     01/15/2025       Troponin I, High Sensitivity ([facility])   Date/Time Value Ref Range Status   01/09/2025 02:30 AM 15 0 - 34 ng/L Final     BNP   Date/Time Value Ref Range Status   01/14/2025 05:35 PM 1,112 (H) 0 - 99 pg/mL Final   01/16/2021 05:55  (H) 0 - 99 pg/mL Final     Comment:     .  <100 pg/mL - Heart failure unlikely  100-299 pg/mL - Intermediate probability of acute heart  .               failure exacerbation. Correlate with clinical  .               context and patient history.    >=300 pg/mL - Heart Failure likely. Correlate with clinical  .               context and patient history.  BNP testing is performed using different testing   methodology at [facility] than at other   [facility] hospitals. Direct result comparisons should   only be made within the same method.     05/20/2020 12:58  (H) 0 - 99 pg/mL Final     Comment:     .  <100 pg/mL - Heart failure unlikely  100-299 pg/mL - Intermediate probability of acute heart  .               failure exacerbation. Correlate with clinical  .               context and patient history.    >=300 pg/mL - Heart Failure likely. Correlate with clinical  .               context and patient history.  BNP testing is  performed using different testing   methodology at [facility] than at other   [facility] hospitals. Direct result comparisons should   only be made within the same method.          Assessment and Plan     89 y.o. female with a PMHx of metastatic DLBCL of left breast (lytic lesions, pulm nodules, recently started on epocritamab), Afib on eliquis, HFpEF, NSTEMI?S/p ureteral stent with recent colonoscopy at [facility] 12/30 c/w C.diff colitis and pan colitis, s/p recent pulm nodule(seen on CT chest/abd/pelvis) bx consistent with metastatic DLBCL [place] [facility] [place] and treatment of recurrence. She was initiated on epocritamab at . Unfortunately her floor course has been complicated by acute hypoxic respiratory failure in the setting of pulmonary edema. TTE demonstrated normal LVEF, severe MR, AI. HF engaged given new echo findings and to assist with diuresis.        #Acute hypoxic respiratory failure in the setting of pulm edema  #severe MR, AI  Ddx include Functional MR in the setting of annular dilation in the setting of her Afib vs ischemic MR. Given also concomitant involvement of AI, radiation induced valvular pathology should be taken into consideration  TTE showing normal LVEF, moderate TR, moderately elevated RVSP, moderate to severe AI, severe MR.  #GURINDER likely cardiorenal syndrome  #hyponatremia-likely hypervolemic hyponatremia    - Volume/Diuresis: She has B/L upper and extremity edema and has hypoalbuminemia, so there is a component of third spacing as well in regards to her fluid assessment. Please hold off on IV lasix today with close monitoring of her renal function.   -trend renal function, electrolytes. Ensure K>4 and Mg>2.  -recommend cardiac MRI with structure, function, morphology to better characterize her valvular pathology vs rule out myocarditis  -please continue to hold sodium chloride tablet.  -continue BB for her afib  -would recommend discontinuation of amlodipine  and consider initiation of lisinopril.  - Strict I/Os, Daily Na < 2g daily.     For any questions or concerns, feel free to reach out via [employer] chat or page at [phone].    Thank you for the opportunity to involve us in the care of this fine individual. This plan was discussed with Dr. Monroe, HF attending. For any questions or concerns, feel free to reach out via [employer] chat or page at [phone].    Ella Costa MD   Heart Failure Fellow  PGY-4

## 2025-01-15 NOTE — ACP (ADVANCE CARE PLANNING)
Confirming Previous Code Status: full code   Advance Care Planning Note     Discussion Date: 01/15/25   Discussion Participants: patient and son    Patient has capacity to make their own medical decisions: No  Health Care Agent/Surrogate Decision Maker documented in chart: Yes    I met with patient and her son Ethan ([phone]) at bedside. Patient awake but with confusion and currently without capacity to make medical decisions. I shared medical updates with Ethan and re-addressed code status. Given patient's overall frailty and multiple medical co-morbidities, I expressed concern that chest compressions or intubation may cause harm and be unlikely to restore Ms. Sandoval' prior quality of life. Ethan shared that his mother has been through a lot with her health lately and had previous intubation. He reports he and his brother are in agreement that if her condition were to worsen, they do not believe she would want chest compressions or a breathing tube.  He would like to continue with current medical treatments but given these wishes, we will update her code status to DNR/DNI.     Treatment Decisions  -Code status updated to DNR/DNI    Bri Adair MD  1/15/2025 3:56 PM

## 2025-01-15 NOTE — PROGRESS NOTES
Adult SLP Clinical Swallow Evaluation    Patient Name: Simi Sandoval  MRN: [id number]  Today's Date: 1/15/2025   Time Calculation  Start Time: 1038  Stop Time: 1116  Time Calculation (min): 38 min       Recommendations:  Solid Diet Recommendations : Minced-Moist Solids  May advance to regular texture when able to utilize dentures  Liquid Diet Recommendations: Thin (IDDSI Level 0)  Direct feed assist  Sips between bites  Frequent oral care    Assessment:  SLP reconsulted for swallow eval in setting of decline in clinical status.     At the present pt's mentation and respiratory status improving as compared to previous date, similar to presentation on initial eval 1/12. Pt's currently unable to wear dentures without her denture adhesive present impacting mastication of solids. Do not suspect pharyngeal dysphagia/aspiration risk at this time given improving clinical status. Recommend minced-moist solids at the present but pt appropriate to advance to regular solids given presence of dentures. SLP to follow-up to ensure tolerance of diet given fluctuating clinical status across admission.     Plan:  SLP Plan: No skilled SLP  No Skilled SLP: At baseline function, No acute SLP goals identified  Discussed POC: Patient, Caregiver/family, Nursing, Physician  Discussed Risks/Benefits: Yes  SLP - OK to Discharge: Yes      Goals:   Pt will tolerate least restrictive diet without s/s aspiration, respiratory compromise, or overt difficulty throughout admission.  Start: 01/15/25, End: 01/29/25      Subjective     History and Physical:    Ms. Sandoval is a 89YO F w/ hx of DLBCL who presents for progressive disease. In review of records, patient diagnosed with DLBCL 2008, was initially 1A in the L breast treated in 1/2008 w/ 4C RCHOP with ppx IT mehotrexate. She also underwent consolidative RT in 2008. She was SARAI until 12/2017 when she had recurrent mediastinal disease, she underwent 4C mini-RCHOP and consolidative RT. She  "remained SARAI until 8/2019 when she presented with bulky recurrence, she was subsequently treated with R-[place]-Cis x5C followed by maintenance revlimid with poor tolerance. She again had recurrently 12/2020 and went on Tafa/Paul with good response and stopped 5/2023 with SARAI. She now progresses again with multiple lytic lesions, enlarging pulm nodules, and mary disease. On presentation she was hypercalcemic. After discussion patient was started on epcoritamab.      Relevant SLP Hx:  Clinical swallow evaluation completed 1/12: \" revealing functional oral phase and no s/s aspiration thus pharyngeal dysphagia is not suspected. Recommend pt resume baseline regular texture diet. No further SLP warranted, please reconsult if new concerns arise.\"      Subjective Presentation:  Pt received upright and alert. Oriented x4 though pt endorses confusion; noted her having difficulty maintaining topic/thought. Followed commands. Willing to participate.     Breathing On supplemental O2 via NC- 2L.    Pain:    Pt denies pain.       Objective     Oral/Motor Assessment:  Oral Hygiene: min amount of sticky secretions from lingual surface to palate  Dentition: Complete Dentures  Oral Motor: Within Functional Limits  Vocal Quality: Within Functional Limits  Intelligibility: Intelligible      Clinical Observations:  Textures Trialed: Thin Liquid , Puree, and Regular Solid  3oz Water Protocol Utilized: yes, pass    Pt consumed ice chips and single sips of water without s/s aspiration. Noted coughing x1 occasion after consecutive sips of water. Proceeded to 3oz milton protocol which pt passed, consuming without s/s aspiration. Noted adequate oral manipulation of purees via direct feed assist. Pt attempted to masticate small piece of balwinder cracker however she stated she couldn't and expectorated it 2/2 lack of upper dentures. No residues noted w/ purees between swallows.  No s/s aspiration w/ solids, pt denied perceived difficulty.     Inpatient " Education:  Pt educated on result and recommendations. Pt indicated understanding though may benefit from repeat edu.

## 2025-01-16 ENCOUNTER — APPOINTMENT (OUTPATIENT)
Dept: RADIOLOGY | Facility: HOSPITAL | Age: 89
DRG: 840 | End: 2025-01-16
Payer: MEDICARE

## 2025-01-16 LAB
ALBUMIN SERPL BCP-MCNC: 2.4 G/DL (ref 3.4–5)
ALBUMIN SERPL BCP-MCNC: 2.7 G/DL (ref 3.4–5)
ALP SERPL-CCNC: 90 U/L (ref 33–136)
ALT SERPL W P-5'-P-CCNC: 24 U/L (ref 7–45)
ANION GAP SERPL CALC-SCNC: 13 MMOL/L (ref 10–20)
ANION GAP SERPL CALC-SCNC: 13 MMOL/L (ref 10–20)
AORTIC VALVE MEAN GRADIENT: 8 MMHG
AORTIC VALVE PEAK VELOCITY: 2.04 M/S
AST SERPL W P-5'-P-CCNC: 23 U/L (ref 9–39)
AV PEAK GRADIENT: 17 MMHG
AVA (PEAK VEL): 1.25 CM2
AVA (VTI): 1.29 CM2
BASOPHILS # BLD AUTO: 0.03 X10*3/UL (ref 0–0.1)
BASOPHILS NFR BLD AUTO: 0.1 %
BILIRUB DIRECT SERPL-MCNC: 0.1 MG/DL (ref 0–0.3)
BILIRUB SERPL-MCNC: 0.5 MG/DL (ref 0–1.2)
BODY SURFACE AREA: 1.66 M2
BUN SERPL-MCNC: 59 MG/DL (ref 6–23)
BUN SERPL-MCNC: 61 MG/DL (ref 6–23)
CALCIUM SERPL-MCNC: 9.2 MG/DL (ref 8.6–10.6)
CALCIUM SERPL-MCNC: 9.7 MG/DL (ref 8.6–10.6)
CHLORIDE SERPL-SCNC: 91 MMOL/L (ref 98–107)
CHLORIDE SERPL-SCNC: 92 MMOL/L (ref 98–107)
CO2 SERPL-SCNC: 28 MMOL/L (ref 21–32)
CO2 SERPL-SCNC: 29 MMOL/L (ref 21–32)
CREAT SERPL-MCNC: 1.58 MG/DL (ref 0.5–1.05)
CREAT SERPL-MCNC: 1.63 MG/DL (ref 0.5–1.05)
CRP SERPL-MCNC: 0.82 MG/DL
EGFRCR SERPLBLD CKD-EPI 2021: 30 ML/MIN/1.73M*2
EGFRCR SERPLBLD CKD-EPI 2021: 32 ML/MIN/1.73M*2
EJECTION FRACTION APICAL 4 CHAMBER: 66.1
EJECTION FRACTION: 70 %
EOSINOPHIL # BLD AUTO: 0.08 X10*3/UL (ref 0–0.4)
EOSINOPHIL NFR BLD AUTO: 0.3 %
ERYTHROCYTE [DISTWIDTH] IN BLOOD BY AUTOMATED COUNT: 16.8 % (ref 11.5–14.5)
FERRITIN SERPL-MCNC: 441 NG/ML (ref 8–150)
FIBRINOGEN PPP-MCNC: 267 MG/DL (ref 200–400)
GLUCOSE SERPL-MCNC: 124 MG/DL (ref 74–99)
GLUCOSE SERPL-MCNC: 153 MG/DL (ref 74–99)
HCT VFR BLD AUTO: 19.7 % (ref 36–46)
HGB BLD-MCNC: 9.8 G/DL (ref 12–16)
HOLD SPECIMEN: NORMAL
IMM GRANULOCYTES # BLD AUTO: 0.26 X10*3/UL (ref 0–0.5)
IMM GRANULOCYTES NFR BLD AUTO: 0.9 % (ref 0–0.9)
LDH SERPL L TO P-CCNC: 522 U/L (ref 84–246)
LEFT VENTRICLE INTERNAL DIMENSION DIASTOLE: 5.12 CM (ref 3.5–6)
LEFT VENTRICULAR OUTFLOW TRACT DIAMETER: 1.76 CM
LYMPHOCYTES # BLD AUTO: 0.08 X10*3/UL (ref 0.8–3)
LYMPHOCYTES NFR BLD AUTO: 0.3 %
MAGNESIUM SERPL-MCNC: 3.19 MG/DL (ref 1.6–2.4)
MAGNESIUM SERPL-MCNC: 3.24 MG/DL (ref 1.6–2.4)
MCH RBC QN AUTO: 49.5 PG (ref 26–34)
MCHC RBC AUTO-ENTMCNC: 49.7 G/DL (ref 32–36)
MCV RBC AUTO: 100 FL (ref 80–100)
MITRAL VALVE E/A RATIO: 2.14
MONOCYTES # BLD AUTO: 1.29 X10*3/UL (ref 0.05–0.8)
MONOCYTES NFR BLD AUTO: 4.5 %
NEUTROPHILS # BLD AUTO: 27.18 X10*3/UL (ref 1.6–5.5)
NEUTROPHILS NFR BLD AUTO: 93.9 %
NRBC BLD-RTO: 0.3 /100 WBCS (ref 0–0)
PHOSPHATE SERPL-MCNC: 3.2 MG/DL (ref 2.5–4.9)
PHOSPHATE SERPL-MCNC: 3.5 MG/DL (ref 2.5–4.9)
PLATELET # BLD AUTO: 404 X10*3/UL (ref 150–450)
POTASSIUM SERPL-SCNC: 4.3 MMOL/L (ref 3.5–5.3)
POTASSIUM SERPL-SCNC: 4.4 MMOL/L (ref 3.5–5.3)
PROT SERPL-MCNC: 4.5 G/DL (ref 6.4–8.2)
RBC # BLD AUTO: 1.98 X10*6/UL (ref 4–5.2)
RIGHT VENTRICLE FREE WALL PEAK S': 14 CM/S
RIGHT VENTRICLE PEAK SYSTOLIC PRESSURE: 54.8 MMHG
SODIUM SERPL-SCNC: 128 MMOL/L (ref 136–145)
SODIUM SERPL-SCNC: 130 MMOL/L (ref 136–145)
TRICUSPID ANNULAR PLANE SYSTOLIC EXCURSION: 2.3 CM
URATE SERPL-MCNC: 6.5 MG/DL (ref 2.3–6.7)
WBC # BLD AUTO: 28.7 X10*3/UL (ref 4.4–11.3)

## 2025-01-16 PROCEDURE — 2500000002 HC RX 250 W HCPCS SELF ADMINISTERED DRUGS (ALT 637 FOR MEDICARE OP, ALT 636 FOR OP/ED)

## 2025-01-16 PROCEDURE — 2500000004 HC RX 250 GENERAL PHARMACY W/ HCPCS (ALT 636 FOR OP/ED): Performed by: STUDENT IN AN ORGANIZED HEALTH CARE EDUCATION/TRAINING PROGRAM

## 2025-01-16 PROCEDURE — 84550 ASSAY OF BLOOD/URIC ACID: CPT

## 2025-01-16 PROCEDURE — 80069 RENAL FUNCTION PANEL: CPT | Mod: CCI | Performed by: NURSE PRACTITIONER

## 2025-01-16 PROCEDURE — 86140 C-REACTIVE PROTEIN: CPT

## 2025-01-16 PROCEDURE — 75561 CARDIAC MRI FOR MORPH W/DYE: CPT | Performed by: RADIOLOGY

## 2025-01-16 PROCEDURE — 99233 SBSQ HOSP IP/OBS HIGH 50: CPT | Performed by: STUDENT IN AN ORGANIZED HEALTH CARE EDUCATION/TRAINING PROGRAM

## 2025-01-16 PROCEDURE — 83735 ASSAY OF MAGNESIUM: CPT

## 2025-01-16 PROCEDURE — 1170000001 HC PRIVATE ONCOLOGY ROOM DAILY

## 2025-01-16 PROCEDURE — 2500000001 HC RX 250 WO HCPCS SELF ADMINISTERED DRUGS (ALT 637 FOR MEDICARE OP)

## 2025-01-16 PROCEDURE — 84100 ASSAY OF PHOSPHORUS: CPT

## 2025-01-16 PROCEDURE — 85025 COMPLETE CBC W/AUTO DIFF WBC: CPT

## 2025-01-16 PROCEDURE — A9575 INJ GADOTERATE MEGLUMI 0.1ML: HCPCS | Performed by: STUDENT IN AN ORGANIZED HEALTH CARE EDUCATION/TRAINING PROGRAM

## 2025-01-16 PROCEDURE — 2500000004 HC RX 250 GENERAL PHARMACY W/ HCPCS (ALT 636 FOR OP/ED): Performed by: PHYSICIAN ASSISTANT

## 2025-01-16 PROCEDURE — 2500000001 HC RX 250 WO HCPCS SELF ADMINISTERED DRUGS (ALT 637 FOR MEDICARE OP): Performed by: PHYSICIAN ASSISTANT

## 2025-01-16 PROCEDURE — 82728 ASSAY OF FERRITIN: CPT

## 2025-01-16 PROCEDURE — 2550000001 HC RX 255 CONTRASTS: Performed by: STUDENT IN AN ORGANIZED HEALTH CARE EDUCATION/TRAINING PROGRAM

## 2025-01-16 PROCEDURE — 99232 SBSQ HOSP IP/OBS MODERATE 35: CPT | Performed by: INTERNAL MEDICINE

## 2025-01-16 PROCEDURE — 2500000005 HC RX 250 GENERAL PHARMACY W/O HCPCS: Performed by: PHYSICIAN ASSISTANT

## 2025-01-16 PROCEDURE — 80053 COMPREHEN METABOLIC PANEL: CPT

## 2025-01-16 PROCEDURE — 85384 FIBRINOGEN ACTIVITY: CPT

## 2025-01-16 PROCEDURE — 75561 CARDIAC MRI FOR MORPH W/DYE: CPT

## 2025-01-16 PROCEDURE — 82248 BILIRUBIN DIRECT: CPT

## 2025-01-16 PROCEDURE — 2500000004 HC RX 250 GENERAL PHARMACY W/ HCPCS (ALT 636 FOR OP/ED)

## 2025-01-16 PROCEDURE — 83735 ASSAY OF MAGNESIUM: CPT | Performed by: NURSE PRACTITIONER

## 2025-01-16 PROCEDURE — 83615 LACTATE (LD) (LDH) ENZYME: CPT

## 2025-01-16 RX ORDER — DEXAMETHASONE SODIUM PHOSPHATE 10 MG/ML
10 INJECTION INTRAMUSCULAR; INTRAVENOUS EVERY 8 HOURS
Status: COMPLETED | OUTPATIENT
Start: 2025-01-16 | End: 2025-01-17

## 2025-01-16 RX ORDER — LIDOCAINE 560 MG/1
1 PATCH PERCUTANEOUS; TOPICAL; TRANSDERMAL DAILY
Status: DISCONTINUED | OUTPATIENT
Start: 2025-01-16 | End: 2025-01-22 | Stop reason: HOSPADM

## 2025-01-16 RX ORDER — DICLOFENAC SODIUM 10 MG/G
4 GEL TOPICAL 4 TIMES DAILY PRN
Status: DISCONTINUED | OUTPATIENT
Start: 2025-01-16 | End: 2025-01-22 | Stop reason: HOSPADM

## 2025-01-16 RX ORDER — GADOTERATE MEGLUMINE 376.9 MG/ML
25 INJECTION INTRAVENOUS
Status: COMPLETED | OUTPATIENT
Start: 2025-01-16 | End: 2025-01-16

## 2025-01-16 RX ADMIN — DEXAMETHASONE SODIUM PHOSPHATE 10 MG: 10 INJECTION INTRAMUSCULAR; INTRAVENOUS at 15:53

## 2025-01-16 RX ADMIN — LIDOCAINE 4% 1 PATCH: 40 PATCH TOPICAL at 17:28

## 2025-01-16 RX ADMIN — DICLOFENAC SODIUM 4 G: 10 GEL TOPICAL at 17:28

## 2025-01-16 RX ADMIN — GADOTERATE MEGLUMINE 25 ML: 376.9 INJECTION INTRAVENOUS at 11:04

## 2025-01-16 RX ADMIN — ONDANSETRON 4 MG: 2 INJECTION INTRAMUSCULAR; INTRAVENOUS at 06:22

## 2025-01-16 RX ADMIN — PIPERACILLIN SODIUM AND TAZOBACTAM SODIUM 3.38 G: 3; .375 INJECTION, SOLUTION INTRAVENOUS at 21:14

## 2025-01-16 RX ADMIN — PIPERACILLIN SODIUM AND TAZOBACTAM SODIUM 3.38 G: 3; .375 INJECTION, SOLUTION INTRAVENOUS at 15:53

## 2025-01-16 RX ADMIN — DEXAMETHASONE SODIUM PHOSPHATE 10 MG: 10 INJECTION INTRAMUSCULAR; INTRAVENOUS at 22:26

## 2025-01-16 ASSESSMENT — COGNITIVE AND FUNCTIONAL STATUS - GENERAL
CLIMB 3 TO 5 STEPS WITH RAILING: TOTAL
MOVING FROM LYING ON BACK TO SITTING ON SIDE OF FLAT BED WITH BEDRAILS: A LITTLE
DRESSING REGULAR LOWER BODY CLOTHING: TOTAL
EATING MEALS: TOTAL
DRESSING REGULAR UPPER BODY CLOTHING: A LITTLE
DRESSING REGULAR LOWER BODY CLOTHING: TOTAL
DAILY ACTIVITIY SCORE: 8
TOILETING: TOTAL
TURNING FROM BACK TO SIDE WHILE IN FLAT BAD: A LOT
PERSONAL GROOMING: TOTAL
DAILY ACTIVITIY SCORE: 8
WALKING IN HOSPITAL ROOM: TOTAL
WALKING IN HOSPITAL ROOM: TOTAL
DRESSING REGULAR UPPER BODY CLOTHING: A LITTLE
MOBILITY SCORE: 11
TURNING FROM BACK TO SIDE WHILE IN FLAT BAD: A LOT
STANDING UP FROM CHAIR USING ARMS: A LOT
STANDING UP FROM CHAIR USING ARMS: A LOT
TOILETING: TOTAL
MOBILITY SCORE: 11
CLIMB 3 TO 5 STEPS WITH RAILING: TOTAL
MOVING TO AND FROM BED TO CHAIR: A LOT
PERSONAL GROOMING: TOTAL
MOVING TO AND FROM BED TO CHAIR: A LOT
HELP NEEDED FOR BATHING: TOTAL
MOVING FROM LYING ON BACK TO SITTING ON SIDE OF FLAT BED WITH BEDRAILS: A LITTLE
EATING MEALS: TOTAL
HELP NEEDED FOR BATHING: TOTAL

## 2025-01-16 ASSESSMENT — PAIN - FUNCTIONAL ASSESSMENT: PAIN_FUNCTIONAL_ASSESSMENT: 0-10

## 2025-01-16 ASSESSMENT — PAIN SCALES - GENERAL
PAINLEVEL_OUTOF10: 8
PAINLEVEL_OUTOF10: 5 - MODERATE PAIN

## 2025-01-16 NOTE — PROGRESS NOTES
Speech-Language Pathology             Therapy Communication Note     Patient Name: Simi Sandoval  MRN:  [id number]  Today's Date:  01/16/25  Missed Time: 1040     Missed Visit Reason:  Pt off floor for MRI, will reattempt when available/schedule permitting.

## 2025-01-16 NOTE — PROGRESS NOTES
Physical Therapy                 Therapy Communication Note    Patient Name: Simi Sandoval  MRN: [id number]  Department: [facility]  Room: 3004/3004-A  Today's Date: 1/16/2025     Discipline: Physical Therapy    PT Missed Visit: Yes     Missed Visit Reason: Missed Visit Reason: Patient in a medical procedure (Pt is off the floor at this time. Will re-attempt as able and appropriate)    Missed Time: Attempt

## 2025-01-16 NOTE — CARE PLAN
The patient's goals for the shift include      The clinical goals for the shift include pt to remain hds

## 2025-01-16 NOTE — PROGRESS NOTES
"Simi Sandoval is a 89 y.o. female on day 8 of admission presenting with DLBCL (diffuse large B cell lymphoma).    Subjective   Patient lethargic and confused. She answers questions but is oriented to self only. She was on 4L via NC in the morning. No f/c. She had some small emesis. No bleeding. No CP, SOB reported. No pain reported. Patient refusing medications at times. Whitaker in place.       Objective     Physical Exam  Constitutional:       Comments: Lethargic but answers questions   HENT:      Head: Normocephalic and atraumatic.      Mouth/Throat:      Mouth: Mucous membranes are dry.      Pharynx: No oropharyngeal exudate.   Eyes:      Extraocular Movements: Extraocular movements intact.      Pupils: Pupils are equal, round, and reactive to light.   Neck:      Comments: Able to move without difficulty  Cardiovascular:      Rate and Rhythm: Regular rhythm.      Heart sounds: No murmur heard.     No gallop.   Pulmonary:      Effort: No respiratory distress.      Breath sounds: No wheezing or rhonchi.      Comments: On 4L via NC  Abdominal:      General: Bowel sounds are normal. There is no distension.      Palpations: Abdomen is soft.      Comments: Mildly tender to palp   Musculoskeletal:      Right lower leg: Edema present.      Left lower leg: Edema present.   Skin:     General: Skin is warm and dry.   Neurological:      Motor: Weakness present.      Comments: Oriented to self only   Psychiatric:      Comments: Patient refusing medications, more confused         Last Recorded Vitals  Blood pressure 96/60, pulse 53, temperature 36.2 °C (97.2 °F), temperature source Temporal, resp. rate 18, height 1.589 m (5' 2.56\"), weight 62.4 kg (137 lb 9.1 oz), SpO2 97%.  Intake/Output last 3 Shifts:  I/O last 3 completed shifts:  In: 60 (0.9 mL/kg) [P.O.:60]  Out: 1550 (22.3 mL/kg) [Urine:1550 (0.6 mL/kg/hr)]  Weight: 69.4 kg     Relevant Results  Scheduled medications  acyclovir, 400 mg, oral, q12h VERONICA  allopurinol, " 300 mg, oral, Daily  amiodarone, 200 mg, oral, Daily  [Held by provider] amLODIPine, 5 mg, oral, Daily  apixaban, 5 mg, oral, BID  busPIRone, 7.5 mg, oral, TID  dexAMETHasone, 10 mg, intravenous, q8h  DULoxetine, 20 mg, oral, Daily  fluconazole, 400 mg, oral, Daily  [Held by provider] furosemide, 40 mg, intravenous, q12h  lidocaine, 1 patch, transdermal, Daily  magnesium oxide, 400 mg, oral, q12h VERONICA  methIMAzole, 2.5 mg, oral, Daily  metoprolol succinate XL, 100 mg, oral, Daily  pantoprazole, 40 mg, oral, Daily before breakfast  piperacillin-tazobactam, 3.375 g, intravenous, q6h  rOPINIRole, 0.5 mg, oral, Nightly      Continuous medications     PRN medications  PRN medications: alteplase, ipratropium-albuteroL, melatonin, methocarbamol, moisturizing mouth, ondansetron, oxyCODONE    Results for orders placed or performed during the hospital encounter of 01/08/25 (from the past 24 hours)   Renal Function Panel   Result Value Ref Range    Glucose 122 (H) 74 - 99 mg/dL    Sodium 129 (L) 136 - 145 mmol/L    Potassium 4.3 3.5 - 5.3 mmol/L    Chloride 92 (L) 98 - 107 mmol/L    Bicarbonate 29 21 - 32 mmol/L    Anion Gap 12 10 - 20 mmol/L    Urea Nitrogen 55 (H) 6 - 23 mg/dL    Creatinine 1.51 (H) 0.50 - 1.05 mg/dL    eGFR 33 (L) >60 mL/min/1.73m*2    Calcium 9.3 8.6 - 10.6 mg/dL    Phosphorus 3.3 2.5 - 4.9 mg/dL    Albumin 2.6 (L) 3.4 - 5.0 g/dL   Magnesium   Result Value Ref Range    Magnesium 2.90 (H) 1.60 - 2.40 mg/dL   CBC and Auto Differential   Result Value Ref Range    WBC 28.7 (H) 4.4 - 11.3 x10*3/uL    nRBC 0.3 (H) 0.0 - 0.0 /100 WBCs    RBC 1.98 (L) 4.00 - 5.20 x10*6/uL    Hemoglobin 9.8 (L) 12.0 - 16.0 g/dL    Hematocrit 19.7 (L) 36.0 - 46.0 %     80 - 100 fL    MCH 49.5 (H) 26.0 - 34.0 pg    MCHC 49.7 (H) 32.0 - 36.0 g/dL    RDW 16.8 (H) 11.5 - 14.5 %    Platelets 404 150 - 450 x10*3/uL    Neutrophils % 93.9 40.0 - 80.0 %    Immature Granulocytes %, Automated 0.9 0.0 - 0.9 %    Lymphocytes % 0.3 13.0 -  44.0 %    Monocytes % 4.5 2.0 - 10.0 %    Eosinophils % 0.3 0.0 - 6.0 %    Basophils % 0.1 0.0 - 2.0 %    Neutrophils Absolute 27.18 (H) 1.60 - 5.50 x10*3/uL    Immature Granulocytes Absolute, Automated 0.26 0.00 - 0.50 x10*3/uL    Lymphocytes Absolute 0.08 (L) 0.80 - 3.00 x10*3/uL    Monocytes Absolute 1.29 (H) 0.05 - 0.80 x10*3/uL    Eosinophils Absolute 0.08 0.00 - 0.40 x10*3/uL    Basophils Absolute 0.03 0.00 - 0.10 x10*3/uL   Comprehensive Metabolic Panel   Result Value Ref Range    Glucose 153 (H) 74 - 99 mg/dL    Sodium 128 (L) 136 - 145 mmol/L    Potassium 4.3 3.5 - 5.3 mmol/L    Chloride 91 (L) 98 - 107 mmol/L    Bicarbonate 28 21 - 32 mmol/L    Anion Gap 13 10 - 20 mmol/L    Urea Nitrogen 59 (H) 6 - 23 mg/dL    Creatinine 1.63 (H) 0.50 - 1.05 mg/dL    eGFR 30 (L) >60 mL/min/1.73m*2    Calcium 9.7 8.6 - 10.6 mg/dL    Albumin 2.7 (L) 3.4 - 5.0 g/dL    Alkaline Phosphatase 90 33 - 136 U/L    Total Protein 4.5 (L) 6.4 - 8.2 g/dL    AST 23 9 - 39 U/L    Bilirubin, Total 0.5 0.0 - 1.2 mg/dL    ALT 24 7 - 45 U/L   Magnesium   Result Value Ref Range    Magnesium 3.24 (H) 1.60 - 2.40 mg/dL   Phosphorus   Result Value Ref Range    Phosphorus 3.2 2.5 - 4.9 mg/dL   C-Reactive Protein   Result Value Ref Range    C-Reactive Protein 0.82 <1.00 mg/dL   Ferritin   Result Value Ref Range    Ferritin 441 (H) 8 - 150 ng/mL   Fibrinogen   Result Value Ref Range    Fibrinogen 267 200 - 400 mg/dL   Lactate Dehydrogenase   Result Value Ref Range     (H) 84 - 246 U/L   Uric Acid   Result Value Ref Range    Uric Acid 6.5 2.3 - 6.7 mg/dL   Bilirubin, Direct   Result Value Ref Range    Bilirubin, Direct 0.1 0.0 - 0.3 mg/dL     *Note: Due to a large number of results and/or encounters for the requested time period, some results have not been displayed. A complete set of results can be found in Results Review.             This patient has a central line   Reason for the central line remaining today? Parenteral medication    This  patient has a urinary catheter   Reason for the urinary catheter remaining today? urinary retention/bladder outlet obstruction, acute or chronic          Malnutrition Diagnosis Status: New  Malnutrition Diagnosis: Severe malnutrition related to chronic disease or condition (CA, CHF)  As Evidenced by: pt estimated to be consuming <75% estimated energy needs x >1 month with areas of severe muscle and fat losses noted during nutrition exam  I agree with the dietitian's malnutrition diagnosis.      Assessment/Plan   Assessment & Plan  DLBCL (diffuse large B cell lymphoma)    Simi Sandoval is an 89-year-old F with PMH DLBCL of left breast, A-fib (on Eliquis), pulmonary hypertension, Hysterectomy, Thyroid disease,  CHF, NSTEMI, s/p uretral stent (12/13) who comes in for evaluation of left flank pain, left  sided abdominal pain and diarrhea at OSH on 12/27. Colonoscopy completed 12/30 and was found to have Cdiff colitis with pancolitis. Pt was started on Vanco for Pancolitis and was treated with zosyn for aspiration pneumonia seen on CT A/P S/P colonoscopy. CT imaging found multiple pulmonary nodules bilaterally, largest dependently on the left measuring 2.6 cm. Biopsy of left lung nodule done 12/11/2024 showed diffuse aggressive large B-cell lymphoma, recurrent by history. Heme/Onc saw the pt at OSH and recommended to transfer to [facility] for further work up, evaluation and treatment of disease recurrence. She was started on epcoritamab ramp up, however, course complicated by acute decompensated heart failure in the setting of severe MR and AI and encephalopathy.        Day 7 of epcoritamab       ONC   #Diffuse large B-cell lymphoma   - Last treated with Tafasitamab 5/16/2023   - New lung nodules favoring recurrence of lymphoma back in December.    - last chemo in 2023   - Pathology from lung nodule biopsy 12/11/2024 confirmed disease recurrence .  - CT Flank 12/27/2024 shows disease progression in pulmonary nodules and  new cortical lytic lesions in L4-5.   - S/p Dexamethasone 4 mg BID at OSH by Heme/Onc and ordered MRI per Oncology at OSH on admission to rule out cord compression.  - MRI 1/9: negative for cord compression, soft tissue masses involving the paraspinal musculature, left greater than right with involvement of the left lateral epidural space at the L5 level resulting in moderate spinal canal stenosis  - Continue C1 epcoritamab (D1: 1/10/25). Today is Day 7. daily CRS labs, daily TLS labs  -Patient with lethargy and confusion 1/16/25 will start Dexamethasone 10mg IV every 8 hours for 3 doses to treat her for ICANS as per Dr. Salcido.     HEME  #Leukocytosis  -WBC 25.4 on admit  -most likely secondary to colitis vs. Dex  #Anemia  -Hgb 10 on admit  -Transfuse if Hgb<7  -Monitor CBC daily     CARDS   - LVEF 57%. Vegetation noted on prior echo.  # chronic HFpEF    - C/w BB, strict I and O's   #Acute decompensated heart failure in the setting of severe MR and AI  -ECHO (1/14):  EF normal, Severe MVR,  Severely dilated LA, Moderate  to severe AVR, compared to prior exams  new severe MR  and AI   - Consulted HF (1/14)  40mg lasix BID IV, maintain K+>4, Mag >2, strict I/o , daily weights . Stopped lasix 1/15/25 as per HF  -cardiac MRI today  # Paroxysmal Afib    - Continue home amiodarone, metoprolol XR, and ppx Eliquis  # Hypertension   - stop amlodipine as per HF. Consider starting lisinopril     PULM  #Acute hypoxic Resp failure    #Aspiration Pneumonia  #ADHF  - Doesn't wear O2 at baseline, wean as tolerated  - Patient likely with aspiration pneumonia status post colonoscopy on 12/30/2024.  She was supine laying on her left side which is where the infiltrate was shown on chest x-ray.  Concern for aspiration during procedure  - S/p 7 day course of Zosyn at OSH  - CXR on admission shows with mild-to-moderate degree of pulmonary interstitial edema. Small left and right pleural effusion  - S/p bipap for acute hypoxic respiratory  failure at OSH  - S/p intubation in 11/2024 for pneumonia and CHF exacerbation  - Repeat CXR (1/11) with pulmonary congestion, small left pleural effusion  - ongoing diuresis with BID IV lasix 40mg. Stopped lasix 1/15/25  -concern for possible aspiration pneumonia. Resumed Zosyn 1/16/25(allergy listed but previously tolerated)     FEN/GI  Admit wt: 64 kg, current wt: 62.4 kg(1/16/25)  F: PRN  E: PRN  N: low path diet minced and moist with thin liquids, 1L fluid restriction.  #GURINDER  -Cr baseline 0.9, now uptrended to 1.45. Found to have acute urinary retention 1/15. Ddx includes obstructive GURINDER, cardiorenal    -maki placed 1/15/25  # Hypercalcemia, improving  - sCa 12.6 on admit, 1/10: 12.4, 1/12: 10.5  - Increase mIVF to 100ml/hr, stopped fluids 1/10 with consideration of hx of CHF  - Zometa 4mg x1 (1/9)  - Calcitonin BID x2 doses (1/10) -- repeat 2 doses on 1/1  #Hyponatremia  :: 2/2 fluid volume overload  - Initiate fluid restriction of 1L (1/12)  - Restart home NaCl tabs 1g BID, Dc'd  on 1/14   due to possibly being hypervolemic  hyponatremia , may improve with diuresising per HF,  continue to monitor   - Monitor daily  - On 1/13 urine studies-Urine Na 14, urine Cr 90, urine osmol-589, urine culture 1/13- 20,000-80,000 Enteric bacilli  #Emesis: swallow evaluation performed today. Patient unable to wear her dentures and modified diet (minced/moist) placed     URO  # Left hydronephrosis, Urinary Retention s/p Left ureteral stent placed on 12/13/24   - Seen by Urology at OSH  - S/p maki removed on 1/5   #Acute urinary retention (1/15)  - order for maki      ID  # C. diff colitis with pan colitis, resolved   - CT A/P shows mild pancolitis on distal rectosigmoid and pseudomembranes colitis on Colonoscopy (12/30)   - S/p PO Vanco at OSH and completed po vancomycin to 125mg qid on admit with last doses 1/13/25.   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 She denies any increase or change in her neuropathy symptoms. Per Neurology at OSH  - Continue supportive measures    - No further neurological testing needed at this point   #Encephalopathy: will obtain blood and urine cultures. Suspect driven by other medical co-morbidities rather than ICANS. ICE score 9/10 (lost point due to pt unable to hold pen to write a sentence). Pt unable to do ICE score today  #altered mental status  -MRI brain 1/15-There is no evidence of acute hemorrhage, mass lesion or acute  Infarction, No evidence of intracranial metastatic disease within limitations  of a noncontrast enhanced examination.     MSK   # Lumbar Spinal stenosis    - Started on Oxy 5 mg PRN and cont with Robaxin 500 mg TID PRN -- will cautiously increase oxy as of 1/11       PSYCH   # Anxiety   - C/w Buspar 7.5 mg TID and Cymbalta  -consult Supportive Onc (they will see Monday)      ENDO   # Hyperthyroidism   - C/w methimazole      Dispo  - Full code  - R Med port  - Primary Oncologist Dr Mac     Patient seen, examined, and discussed with WILLIAM MonsalveC

## 2025-01-16 NOTE — PROGRESS NOTES
Advanced Heart Failure Progress Note   Consulting Team: onc  Reason for Consult: pulm edema, new echo findings of JENNIFER LAZAR          Subjective   On NC this morning  Cardiac MRI consistent with possible myocarditis     Objective   Physical Exam  Vitals:    01/16/25 1251   BP: 96/60   Pulse: 53   Resp: 18   Temp: 36.2 °C (97.2 °F)   SpO2: 97%     GEN: ill appearing,  In no acute distress  CV: systolic 3/6 murmur+  LUNGS: Decreased breath sounds b/l  ABD: Soft, NT/ND,  EXT: cool extremities, 2-3+ pitting edema b/l both lower and upper extemity  NEURO: awake, alert, following commands    I have personally reviewed the following images and laboratory findings:    Results for orders placed during the hospital encounter of 01/08/25    Transthoracic Echo (TTE) Limited    [facility], [address] [place] [address]  Tel [phone] and Fax [phone]    TRANSTHORACIC ECHOCARDIOGRAM REPORT      Patient Name:       LEXUS CARRERA Reading Physician:    [id number] Marcia Kent MD  Study Date:         1/14/2025           Ordering Provider:    [id number] PATO MORILLO  MRN/PID:            [id number]            Fellow:  Accession#:         [id number]        Nurse:  Date of Birth/Age:  1935 / 89      Sonographer:          Yuliya aragon                                     [facility]  Gender assigned at  F                   Additional Staff:  Birth:  Height:             157.48 cm           Admit Date:           1/8/2025  Weight:             63.05 kg            Admission Status:     Inpatient - STAT  BSA / BMI:          1.64 m2 / 25.42     Encounter#:           [id number]  kg/m2  Blood Pressure:     99/59 mmHg          Department Location:  [place] [address]    Study Type:    TRANSTHORACIC ECHO (TTE) LIMITED  Diagnosis/ICD: Unspecified atrial fibrillation-I48.91  Indication:    Fluid overload  CPT Code:      Echo Limited-56408; Doppler Limited-58454; Color Doppler-80098    Patient History:  Pertinent  History: DLBCL of left breast; A-Fib; PHTN; CHF; NSTEMI; Pulmonary  nodules; 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    Study Detail: The following Echo studies were performed: 2D, M-Mode, Doppler and  color flow. Technically challenging study due to body habitus and  patient lying in supine position.      Critical Event  Critical Event: Test was completed as per department protocol.  Critical Finding: Possible severe MR.  Time Test was Completed: 12:30:43 PM  Notified: Dr. Kent.  Attending notification time: 12:33:44 PM    PHYSICIAN INTERPRETATION:  Left Ventricle: Left ventricular ejection fraction is normal, calculated by Guerrero's biplane at 70%. There are no regional left ventricular wall motion abnormalities. The left ventricular cavity size is normal. There is normal septal and normal posterior left ventricular wall thickness. Left ventricular diastolic filling cannot be determined, due to severe mitral regurgitation.  Left Atrium: The left atrium is severely dilated.  Right Ventricle: The right ventricle is normal in size. There is normal right ventricular global systolic function.  Right Atrium: The right atrium is normal in size.  Aortic Valve: The aortic valve is trileaflet. There is mild to moderate aortic valve cusp calcification. There is mild to moderate aortic valve thickening. The aortic valve dimensionless index is 0.53. There is moderate to severe aortic valve regurgitation. The peak instantaneous gradient of the aortic valve is 17 mmHg. The mean gradient of the aortic valve is 8 mmHg.  Mitral Valve: The mitral valve is mildly thickened. There is moderate mitral annular calcification. There is severe mitral valve regurgitation which is posteriorly directed. The mitral regurgitant orifice area is 37 mm2. The mitral regurgitant volume is 57.60 ml. There is flow reversal in the right pulmonary vein. Mildly thickened MV leaflets with relatively restricted leaflet closure ( posterior  leaflet restriction is greater than anterior with annular dilatation from severely dilated LA causing severe posteriorly directed MR.  Tricuspid Valve: The tricuspid valve is structurally normal. There is moderate tricuspid regurgitation. The Doppler estimated RVSP is moderately elevated at 54.8 mmHg.  Pulmonic Valve: The pulmonic valve is structurally normal. Pulmonic valve regurgitation was not assessed.  Pericardium: Trivial pericardial effusion.  Pleural: There is left pleural effusion.  Aorta: The aortic root is normal.  Systemic Veins: The inferior vena cava appears normal in size, with IVC inspiratory collapse less than 50%.  In comparison to the previous echocardiogram(s): Compared with the prior exam at  from 3/15/2018 the severe MR and AI are new. Prior study with only mild MR and no AI. The LV systolic function remains preserved. Report from echo at [facility] in December ( no images available ) reported moderate to severe AI, normal LV systolic function and no mention of MR.      CONCLUSIONS:  1. Left ventricular ejection fraction is normal, calculated by Guerrero's biplane at 70%.  2. Left ventricular diastolic filling cannot be determined, due to severe mitral regurgitation.  3. There is normal right ventricular global systolic function.  4. The left atrium is severely dilated.  5. Mildly thickened MV leaflets with relatively restricted leaflet closure ( posterior leaflet restriction is greater than anterior with annular dilatation from severely dilated LA causing severe posteriorly directed MR.  6. There is moderate mitral annular calcification.  7. Severe mitral valve regurgitation.  8. Moderate tricuspid regurgitation visualized.  9. Moderately elevated right ventricular systolic pressure.  10. Moderate to severe aortic valve regurgitation.  11. The inferior vena cava appears normal in size, with IVC inspiratory collapse less than 50%.  12. Ordering provider notified of findings at the time of  reporting.  13. Compared with the prior exam at  from 3/15/2018 the severe MR and AI are new. Prior study with only mild MR and no AI. The LV systolic function remains preserved. Report from echo at [facility] in December ( no images available ) reported moderate to severe AI, normal LV systolic function and no mention of MR.    RECOMMENDATIONS:  Utilizing an FDA cleared automated machine learning algorithm (EchoGo Heart Failure by [employer]), the analysis of the apical 4-chamber echocardiogram suggests the presence of heart failure with preserved ejection fraction (HFpEF)*. Clinical correlation looking for additional heart failure signs and symptoms is recommended, as a definite diagnosis of heart failure cannot be made by imaging alone.  *Per ACC/AHA/HFSA universal diagnosis of heart failure, HFpEF is defined as 1) signs and symptoms leading to clinical diagnosis of heart failure, 2) an ejection fraction of at least 50%, and 3) evidence of elevated intra-cardiac filling pressures by echocardiography, BNP elevation, or catheterization.    QUANTITATIVE DATA SUMMARY:    2D MEASUREMENTS:          Normal Ranges:  Ao Root d:       3.00 cm  (2.0-3.7cm)  LAs:             4.90 cm  (2.7-4.0cm)  IVSd:            0.77 cm  (0.6-1.1cm)  LVPWd:           0.83 cm  (0.6-1.1cm)  LVIDd:           5.12 cm  (3.9-5.9cm)  LVIDs:           3.23 cm  LV Mass Index:   86 g/m2  LVEDV Index:     48 ml/m2  LV % FS          37.0 %      LA VOLUME:                  Normal Ranges:  LA Volume Index: 66.8 ml/m2      RA VOLUME BY A/L METHOD:          Normal Ranges:  RA Area A4C:             16.8 cm2      AORTA MEASUREMENTS:         Normal Ranges:  Ao Sinus, d:        3.00 cm (2.1-3.5cm)  Asc Ao, d:          3.20 cm (2.1-3.4cm)      LV SYSTOLIC FUNCTION BY 2D PLANIMETRY (MOD):  Normal Ranges:  EF-A4C View:    66 % (>=55%)  EF-A2C View:    72 %  EF-Biplane:     70 %  LV EF Reported: 70 %      LV DIASTOLIC FUNCTION:             Normal Ranges:  MV Peak  "E:             1.15 m/s    (0.7-1.2 m/s)  MV Peak A:             0.54 m/s    (0.42-0.7 m/s)  E/A Ratio:             2.14        (1.0-2.2)  MV A Dur:              121.79 msec  MV DT:                 132 msec    (150-240 msec)  PulmV Sys Juan Carlos:         40.96 cm/s  PulmV Cazares Juan [place]:        74.11 cm/s  PulmV S/D Juan [place]:         0.55  PulmV A Revs Juan [place]:      27.80 cm/s  PulmV A Revs Dur:      154.14 msec      MITRAL INSUFFICIENCY:             Normal Ranges:  PISA Radius:          1.0 cm  MR VTI:               155.40 cm  MR Vmax:              459.00 cm/s  MR Alias Juan [place]:         30.0 cm/s  MR Volume:            57.60 ml  MR Flow Rt:           170.12 ml/s  MR EROA:              37 mm2      AORTIC VALVE:                      Normal Ranges:  AoV Vmax:                2.04 m/s  (<=1.7m/s)  AoV Peak P.7 mmHg (<20mmHg)  AoV Mean P.6 mmHg  (1.7-11.5mmHg)  LVOT Max Juan Carlos:            1.05 m/s  (<=1.1m/s)  AoV VTI:                 42.64 cm  (18-25cm)  LVOT VTI:                22.53 cm  LVOT Diameter:           1.76 cm   (1.8-2.4cm)  AoV Area, VTI:           1.29 cm2  (2.5-5.5cm2)  AoV Area,Vmax:           1.25 cm2  (2.5-4.5cm2)  AoV Dimensionless Index: 0.53      AORTIC INSUFFICIENCY:  AI Vmax:       3.96 m/s  AI Half-time:  348 msec  AI Decel Time: 1201 msec  AI Decel Rate: 330.79 cm/s2      RIGHT VENTRICLE:  RV Basal 3.70 cm  RV Mid   2.30 cm  RV Major 5.7 cm  TAPSE:   23.0 mm  RV s'    0.14 m/s      TRICUSPID VALVE/RVSP:          Normal Ranges:  Peak TR Velocity:     3.42 m/s  RV Syst Pressure:     55 mmHg  (< 30mmHg)  IVC Diam:             1.90 cm      Pulmonary Veins:  PulmV A Revs Dur: 154.14 msec  PulmV A Revs Juan Carlos: 27.80 cm/s  PulmV Cazares Juan Carlos:   74.11 cm/s  PulmV S/D Juan Carlos:    0.55  PulmV Sys Juan Carlos:    40.96 cm/s      AORTA:  Asc Ao Diam 3.19 cm      [id number] Marcia Kent MD  Electronically signed on 2025 at 2:33:35 PM        ** Final **           Lab Review   No results found for: \"CKTOTAL\", \"CKMB\", " "\"CKMBINDEX\", \"TROPONINI\"  Lab Results   Component Value Date    WBC 28.7 (H) 01/16/2025    WBC 21.0 (H) 01/15/2025    WBC 19.0 (H) 01/14/2025    HGB 9.8 (L) 01/16/2025    HGB 9.4 (L) 01/15/2025    HGB 9.0 (L) 01/14/2025    HCT 19.7 (L) 01/16/2025    HCT 23.4 (L) 01/15/2025    HCT 24.6 (L) 01/14/2025     01/16/2025    MCV 98 01/15/2025    MCV 86 01/14/2025     01/16/2025     01/15/2025     01/14/2025       Troponin I, High Sensitivity (CMC)   Date/Time Value Ref Range Status   01/09/2025 02:30 AM 15 0 - 34 ng/L Final     BNP   Date/Time Value Ref Range Status   01/14/2025 05:35 PM 1,112 (H) 0 - 99 pg/mL Final   01/16/2021 05:55  (H) 0 - 99 pg/mL Final     Comment:     .  <100 pg/mL - Heart failure unlikely  100-299 pg/mL - Intermediate probability of acute heart  .               failure exacerbation. Correlate with clinical  .               context and patient history.    >=300 pg/mL - Heart Failure likely. Correlate with clinical  .               context and patient history.  BNP testing is performed using different testing   methodology at [facility] than at other   system hospitals. Direct result comparisons should   only be made within the same method.     05/20/2020 12:58  (H) 0 - 99 pg/mL Final     Comment:     .  <100 pg/mL - Heart failure unlikely  100-299 pg/mL - Intermediate probability of acute heart  .               failure exacerbation. Correlate with clinical  .               context and patient history.    >=300 pg/mL - Heart Failure likely. Correlate with clinical  .               context and patient history.  BNP testing is performed using different testing   methodology at [facility] than at other   system hospitals. Direct result comparisons should   only be made within the same method.          Assessment and Plan   89 y.o. female with a PMHx of metastatic DLBCL of left breast (lytic lesions, pulm nodules, recently started on " epocritamab), Afib on eliquis, HFpEF, NSTEMI?S/p ureteral stent with recent colonoscopy at OSH 12/30 c/w C.diff colitis and pan colitis, s/p recent pulm nodule(seen on CT chest/abd/pelvis) bx consistent with metastatic DLBCL transferred to [facility] for further work up and treatment of recurrence. She was initiated on epocritamab at . Unfortunately her floor course has been complicated by acute hypoxic respiratory failure in the setting of pulmonary edema. TTE demonstrated normal LVEF, severe MR, AI. HF engaged given new echo findings and to assist with diuresis.        #Acute hypoxic respiratory failure in the setting of pulm edema  #severe MR, AI. Given both MR and AI, concern for myocarditis as also favored by cMRI today with increased T1,T2 mapping times within apical segments  #B/L pleural effusion  #GURINDER likely cardiorenal syndrome  #hyponatremia-likely hypervolemic hyponatremia     - Volume/Diuresis: She has B/L upper and extremity edema and has hypoalbuminemia, so there is a component of third spacing as well in regards to her fluid assessment. Please continue to hold lasix.  -In regards to treatment of her possible myocarditis, in the absence of treatment of Immune checkpoint inhibitors, would hold of on initiating steroids and recommend conservative management. Oncology team to continue with goals of care discussion with the patient and her son.  -trend renal function, electrolytes. Ensure K>4 and Mg>2.  -please continue to hold sodium chloride tablet.  -continue BB for her afib  -would recommend discontinuation of amlodipine and consider initiation of lisinopril.  - Strict I/Os, Daily Na < 2g daily.     For any questions or concerns, feel free to reach out via [employer] chat or page at [phone].    Ella Costa MD   Heart Failure Fellow  PGY-4      Ella Costa MD

## 2025-01-17 LAB
ALBUMIN SERPL BCP-MCNC: 2.3 G/DL (ref 3.4–5)
ALBUMIN SERPL BCP-MCNC: 2.3 G/DL (ref 3.4–5)
ALP SERPL-CCNC: 78 U/L (ref 33–136)
ALT SERPL W P-5'-P-CCNC: 25 U/L (ref 7–45)
ANION GAP SERPL CALC-SCNC: 12 MMOL/L (ref 10–20)
ANION GAP SERPL CALC-SCNC: 14 MMOL/L (ref 10–20)
AST SERPL W P-5'-P-CCNC: 24 U/L (ref 9–39)
BASOPHILS # BLD MANUAL: 0 X10*3/UL (ref 0–0.1)
BASOPHILS NFR BLD MANUAL: 0 %
BILIRUB DIRECT SERPL-MCNC: 0.2 MG/DL (ref 0–0.3)
BILIRUB SERPL-MCNC: 0.5 MG/DL (ref 0–1.2)
BUN SERPL-MCNC: 51 MG/DL (ref 6–23)
BUN SERPL-MCNC: 57 MG/DL (ref 6–23)
BURR CELLS BLD QL SMEAR: ABNORMAL
CALCIUM SERPL-MCNC: 8.7 MG/DL (ref 8.6–10.6)
CALCIUM SERPL-MCNC: 9.1 MG/DL (ref 8.6–10.6)
CHLORIDE SERPL-SCNC: 94 MMOL/L (ref 98–107)
CHLORIDE SERPL-SCNC: 97 MMOL/L (ref 98–107)
CO2 SERPL-SCNC: 27 MMOL/L (ref 21–32)
CO2 SERPL-SCNC: 31 MMOL/L (ref 21–32)
CREAT SERPL-MCNC: 1.45 MG/DL (ref 0.5–1.05)
CREAT SERPL-MCNC: 1.45 MG/DL (ref 0.5–1.05)
CRP SERPL-MCNC: 2.91 MG/DL
EGFRCR SERPLBLD CKD-EPI 2021: 35 ML/MIN/1.73M*2
EGFRCR SERPLBLD CKD-EPI 2021: 35 ML/MIN/1.73M*2
EOSINOPHIL # BLD MANUAL: 0 X10*3/UL (ref 0–0.4)
EOSINOPHIL NFR BLD MANUAL: 0 %
ERYTHROCYTE [DISTWIDTH] IN BLOOD BY AUTOMATED COUNT: 18.2 % (ref 11.5–14.5)
FERRITIN SERPL-MCNC: 502 NG/ML (ref 8–150)
FIBRINOGEN PPP-MCNC: 268 MG/DL (ref 200–400)
GLUCOSE SERPL-MCNC: 130 MG/DL (ref 74–99)
GLUCOSE SERPL-MCNC: 266 MG/DL (ref 74–99)
HCT VFR BLD AUTO: 21.8 % (ref 36–46)
HGB BLD-MCNC: 8.9 G/DL (ref 12–16)
IMM GRANULOCYTES # BLD AUTO: 0.14 X10*3/UL (ref 0–0.5)
IMM GRANULOCYTES NFR BLD AUTO: 0.7 % (ref 0–0.9)
LDH SERPL L TO P-CCNC: 409 U/L (ref 84–246)
LYMPHOCYTES # BLD MANUAL: 0 X10*3/UL (ref 0.8–3)
LYMPHOCYTES NFR BLD MANUAL: 0 %
MAGNESIUM SERPL-MCNC: 2.71 MG/DL (ref 1.6–2.4)
MAGNESIUM SERPL-MCNC: 2.96 MG/DL (ref 1.6–2.4)
MCH RBC QN AUTO: 40.6 PG (ref 26–34)
MCHC RBC AUTO-ENTMCNC: 40.8 G/DL (ref 32–36)
MCV RBC AUTO: 100 FL (ref 80–100)
MONOCYTES # BLD MANUAL: 0 X10*3/UL (ref 0.05–0.8)
MONOCYTES NFR BLD MANUAL: 0 %
NEUTS SEG # BLD MANUAL: 20.8 X10*3/UL (ref 1.6–5)
NEUTS SEG NFR BLD MANUAL: 100 %
NRBC BLD-RTO: 0.1 /100 WBCS (ref 0–0)
PHOSPHATE SERPL-MCNC: 3.2 MG/DL (ref 2.5–4.9)
PHOSPHATE SERPL-MCNC: 3.6 MG/DL (ref 2.5–4.9)
PLATELET # BLD AUTO: 317 X10*3/UL (ref 150–450)
POTASSIUM SERPL-SCNC: 3.6 MMOL/L (ref 3.5–5.3)
POTASSIUM SERPL-SCNC: 4.3 MMOL/L (ref 3.5–5.3)
PROT SERPL-MCNC: 3.9 G/DL (ref 6.4–8.2)
RBC # BLD AUTO: 2.19 X10*6/UL (ref 4–5.2)
RBC MORPH BLD: ABNORMAL
SCHISTOCYTES BLD QL SMEAR: ABNORMAL
SODIUM SERPL-SCNC: 133 MMOL/L (ref 136–145)
SODIUM SERPL-SCNC: 134 MMOL/L (ref 136–145)
TOTAL CELLS COUNTED BLD: 101
URATE SERPL-MCNC: 5.7 MG/DL (ref 2.3–6.7)
WBC # BLD AUTO: 20.8 X10*3/UL (ref 4.4–11.3)

## 2025-01-17 PROCEDURE — 2500000001 HC RX 250 WO HCPCS SELF ADMINISTERED DRUGS (ALT 637 FOR MEDICARE OP)

## 2025-01-17 PROCEDURE — 97530 THERAPEUTIC ACTIVITIES: CPT | Mod: GO

## 2025-01-17 PROCEDURE — 85384 FIBRINOGEN ACTIVITY: CPT

## 2025-01-17 PROCEDURE — 86140 C-REACTIVE PROTEIN: CPT

## 2025-01-17 PROCEDURE — 83735 ASSAY OF MAGNESIUM: CPT

## 2025-01-17 PROCEDURE — 1170000001 HC PRIVATE ONCOLOGY ROOM DAILY

## 2025-01-17 PROCEDURE — 2500000005 HC RX 250 GENERAL PHARMACY W/O HCPCS: Performed by: PHYSICIAN ASSISTANT

## 2025-01-17 PROCEDURE — 82248 BILIRUBIN DIRECT: CPT

## 2025-01-17 PROCEDURE — 2500000004 HC RX 250 GENERAL PHARMACY W/ HCPCS (ALT 636 FOR OP/ED): Performed by: PHYSICIAN ASSISTANT

## 2025-01-17 PROCEDURE — 84550 ASSAY OF BLOOD/URIC ACID: CPT

## 2025-01-17 PROCEDURE — 82728 ASSAY OF FERRITIN: CPT

## 2025-01-17 PROCEDURE — 85027 COMPLETE CBC AUTOMATED: CPT

## 2025-01-17 PROCEDURE — 83735 ASSAY OF MAGNESIUM: CPT | Performed by: NURSE PRACTITIONER

## 2025-01-17 PROCEDURE — 83615 LACTATE (LD) (LDH) ENZYME: CPT

## 2025-01-17 PROCEDURE — 85007 BL SMEAR W/DIFF WBC COUNT: CPT

## 2025-01-17 PROCEDURE — 80053 COMPREHEN METABOLIC PANEL: CPT

## 2025-01-17 PROCEDURE — 84100 ASSAY OF PHOSPHORUS: CPT

## 2025-01-17 PROCEDURE — 99233 SBSQ HOSP IP/OBS HIGH 50: CPT | Performed by: STUDENT IN AN ORGANIZED HEALTH CARE EDUCATION/TRAINING PROGRAM

## 2025-01-17 PROCEDURE — 2500000004 HC RX 250 GENERAL PHARMACY W/ HCPCS (ALT 636 FOR OP/ED)

## 2025-01-17 PROCEDURE — 80069 RENAL FUNCTION PANEL: CPT | Mod: CCI | Performed by: NURSE PRACTITIONER

## 2025-01-17 PROCEDURE — 97530 THERAPEUTIC ACTIVITIES: CPT | Mod: GP

## 2025-01-17 RX ORDER — DEXAMETHASONE SODIUM PHOSPHATE 10 MG/ML
10 INJECTION INTRAMUSCULAR; INTRAVENOUS EVERY 8 HOURS
Status: COMPLETED | OUTPATIENT
Start: 2025-01-17 | End: 2025-01-18

## 2025-01-17 RX ADMIN — LIDOCAINE 4% 1 PATCH: 40 PATCH TOPICAL at 08:51

## 2025-01-17 RX ADMIN — DEXAMETHASONE SODIUM PHOSPHATE 10 MG: 10 INJECTION INTRAMUSCULAR; INTRAVENOUS at 09:06

## 2025-01-17 RX ADMIN — ACYCLOVIR 400 MG: 400 TABLET ORAL at 21:51

## 2025-01-17 RX ADMIN — APIXABAN 5 MG: 5 TABLET, FILM COATED ORAL at 21:51

## 2025-01-17 RX ADMIN — PIPERACILLIN SODIUM AND TAZOBACTAM SODIUM 3.38 G: 3; .375 INJECTION, SOLUTION INTRAVENOUS at 21:51

## 2025-01-17 RX ADMIN — BUSPIRONE HYDROCHLORIDE 7.5 MG: 15 TABLET ORAL at 21:52

## 2025-01-17 RX ADMIN — PIPERACILLIN SODIUM AND TAZOBACTAM SODIUM 3.38 G: 3; .375 INJECTION, SOLUTION INTRAVENOUS at 10:48

## 2025-01-17 RX ADMIN — PIPERACILLIN SODIUM AND TAZOBACTAM SODIUM 3.38 G: 3; .375 INJECTION, SOLUTION INTRAVENOUS at 15:29

## 2025-01-17 RX ADMIN — DEXAMETHASONE SODIUM PHOSPHATE 10 MG: 10 INJECTION INTRAMUSCULAR; INTRAVENOUS at 18:47

## 2025-01-17 RX ADMIN — ROPINIROLE 0.5 MG: 0.5 TABLET, FILM COATED ORAL at 21:52

## 2025-01-17 RX ADMIN — PIPERACILLIN SODIUM AND TAZOBACTAM SODIUM 3.38 G: 3; .375 INJECTION, SOLUTION INTRAVENOUS at 02:20

## 2025-01-17 ASSESSMENT — COGNITIVE AND FUNCTIONAL STATUS - GENERAL
HELP NEEDED FOR BATHING: TOTAL
MOVING FROM LYING ON BACK TO SITTING ON SIDE OF FLAT BED WITH BEDRAILS: A LOT
STANDING UP FROM CHAIR USING ARMS: TOTAL
TURNING FROM BACK TO SIDE WHILE IN FLAT BAD: A LOT
MOVING FROM LYING ON BACK TO SITTING ON SIDE OF FLAT BED WITH BEDRAILS: TOTAL
DRESSING REGULAR UPPER BODY CLOTHING: TOTAL
STANDING UP FROM CHAIR USING ARMS: TOTAL
DAILY ACTIVITIY SCORE: 7
MOVING TO AND FROM BED TO CHAIR: A LOT
EATING MEALS: TOTAL
MOBILITY SCORE: 9
DRESSING REGULAR LOWER BODY CLOTHING: TOTAL
WALKING IN HOSPITAL ROOM: TOTAL
WALKING IN HOSPITAL ROOM: TOTAL
HELP NEEDED FOR BATHING: TOTAL
CLIMB 3 TO 5 STEPS WITH RAILING: TOTAL
CLIMB 3 TO 5 STEPS WITH RAILING: TOTAL
TOILETING: TOTAL
TOILETING: TOTAL
MOVING TO AND FROM BED TO CHAIR: TOTAL
TURNING FROM BACK TO SIDE WHILE IN FLAT BAD: TOTAL
DRESSING REGULAR LOWER BODY CLOTHING: TOTAL
TOILETING: TOTAL
PERSONAL GROOMING: TOTAL
WALKING IN HOSPITAL ROOM: TOTAL
MOVING TO AND FROM BED TO CHAIR: A LOT
EATING MEALS: TOTAL
TURNING FROM BACK TO SIDE WHILE IN FLAT BAD: A LOT
PERSONAL GROOMING: TOTAL
DRESSING REGULAR UPPER BODY CLOTHING: A LOT
DAILY ACTIVITIY SCORE: 6
HELP NEEDED FOR BATHING: TOTAL
EATING MEALS: TOTAL
DAILY ACTIVITIY SCORE: 7
MOBILITY SCORE: 6
CLIMB 3 TO 5 STEPS WITH RAILING: TOTAL
DRESSING REGULAR LOWER BODY CLOTHING: TOTAL
DRESSING REGULAR UPPER BODY CLOTHING: A LOT
MOBILITY SCORE: 9
PERSONAL GROOMING: TOTAL
MOVING FROM LYING ON BACK TO SITTING ON SIDE OF FLAT BED WITH BEDRAILS: A LOT
STANDING UP FROM CHAIR USING ARMS: TOTAL

## 2025-01-17 ASSESSMENT — PAIN - FUNCTIONAL ASSESSMENT
PAIN_FUNCTIONAL_ASSESSMENT: 0-10
PAIN_FUNCTIONAL_ASSESSMENT: 0-10
PAIN_FUNCTIONAL_ASSESSMENT: CPOT (CRITICAL CARE PAIN OBSERVATION TOOL)

## 2025-01-17 NOTE — PROGRESS NOTES
"Simi Sandoval is a 89 y.o. female on day 9 of admission presenting with DLBCL (diffuse large B cell lymphoma).    Subjective   Patient A+Ox3 today, but remains lethargic; will open eyes if asked, but closes them shortly after. Reports pain in tongue, likely due to very dry mouth. Continues to refuse medications. Continues to refuse ICE scoring. Currently on 4L NC. Denies CP, SOB, abd pain, N/V/D/C. Whitaker in place.    Objective   Physical Exam  Constitutional:       General: She is not in acute distress.     Appearance: She is ill-appearing.      Comments: Lethargic but answers questions   HENT:      Head: Normocephalic and atraumatic.      Mouth/Throat:      Mouth: Mucous membranes are dry.      Pharynx: No oropharyngeal exudate.   Eyes:      Extraocular Movements: Extraocular movements intact.      Pupils: Pupils are equal, round, and reactive to light.   Cardiovascular:      Rate and Rhythm: Normal rate. Rhythm irregular.   Pulmonary:      Effort: Pulmonary effort is normal. No respiratory distress.      Breath sounds: No wheezing or rhonchi.      Comments: On 4L via NC  Abdominal:      General: Bowel sounds are normal. There is no distension.      Palpations: Abdomen is soft.      Tenderness: There is no abdominal tenderness.   Musculoskeletal:      Right lower leg: Edema present.      Left lower leg: Edema present.   Skin:     General: Skin is warm and dry.   Neurological:      Mental Status: She is oriented to person, place, and time.      Motor: Weakness present.   Psychiatric:      Comments: Patient refusing medications, refusing ICE scoring       Last Recorded Vitals  Blood pressure 94/58, pulse 81, temperature 36.3 °C (97.3 °F), temperature source Temporal, resp. rate 18, height 1.589 m (5' 2.56\"), weight 62.4 kg (137 lb 9.1 oz), SpO2 95%.  Intake/Output last 3 Shifts:  I/O last 3 completed shifts:  In: 0 (0 mL/kg)   Out: 1210 (19.4 mL/kg) [Urine:1210 (0.5 mL/kg/hr)]  Weight: 62.4 kg     Relevant " Results  Scheduled medications  acyclovir, 400 mg, oral, q12h VERONICA  allopurinol, 300 mg, oral, Daily  amiodarone, 200 mg, oral, Daily  [Held by provider] amLODIPine, 5 mg, oral, Daily  apixaban, 5 mg, oral, BID  busPIRone, 7.5 mg, oral, TID  dexAMETHasone, 10 mg, intravenous, q8h  DULoxetine, 20 mg, oral, Daily  fluconazole, 400 mg, oral, Daily  [Held by provider] furosemide, 40 mg, intravenous, q12h  lidocaine, 1 patch, transdermal, Daily  magnesium oxide, 400 mg, oral, q12h VERONICA  methIMAzole, 2.5 mg, oral, Daily  metoprolol succinate XL, 100 mg, oral, Daily  pantoprazole, 40 mg, oral, Daily before breakfast  piperacillin-tazobactam, 3.375 g, intravenous, q6h  rOPINIRole, 0.5 mg, oral, Nightly    Continuous medications     PRN medications  PRN medications: alteplase, diclofenac sodium, ipratropium-albuteroL, melatonin, methocarbamol, moisturizing mouth, ondansetron, oxyCODONE    This patient has a central line   Reason for the central line remaining today? Parenteral medication    This patient has a urinary catheter   Reason for the urinary catheter remaining today? urinary retention/bladder outlet obstruction, acute or chronic     Malnutrition Diagnosis Status: New  Malnutrition Diagnosis: Severe malnutrition related to chronic disease or condition (CA, CHF)  As Evidenced by: pt estimated to be consuming <75% estimated energy needs x >1 month with areas of severe muscle and fat losses noted during nutrition exam  I agree with the dietitian's malnutrition diagnosis.      Assessment/Plan     Simi Sandoval is an 89-year-old F with PMH DLBCL of left breast, A-fib (on Eliquis), pulmonary hypertension, Hysterectomy, Thyroid disease,  CHF, NSTEMI, s/p uretral stent (12/13) who comes in for evaluation of left flank pain, left  sided abdominal pain and diarrhea at OSH on 12/27. Colonoscopy completed 12/30 and was found to have Cdiff colitis with pancolitis. Pt was started on Vanco for Pancolitis and was treated with  zosyn for aspiration pneumonia seen on CT A/P S/P colonoscopy. CT imaging found multiple pulmonary nodules bilaterally, largest dependently on the left measuring 2.6 cm. Biopsy of left lung nodule done 12/11/2024 showed diffuse aggressive large B-cell lymphoma, recurrent by history. Heme/Onc saw the pt at OSH and recommended to transfer to [facility] for further work up, evaluation and treatment of disease recurrence. She was started on epcoritamab ramp up, however, course complicated by acute decompensated heart failure in the setting of severe MR and AI and encephalopathy.      Day 8 Cycle 1 Epcoritamab (started 1/10/25), currently held pending GOC discussions    ONC   #Diffuse large B-cell lymphoma   - Last treated with Tafasitamab 5/16/2023   - New lung nodules favoring recurrence of lymphoma back in December.    - last chemo in 2023   - Pathology from lung nodule biopsy 12/11/2024 confirmed disease recurrence.  - CT Flank 12/27/2024 shows disease progression in pulmonary nodules and new cortical lytic lesions in L4-5.   - S/p Dexamethasone 4 mg BID at OSH by Heme/Onc and ordered MRI per Oncology at OSH on admission to rule out cord compression.  - MRI 1/9: negative for cord compression, soft tissue masses involving the paraspinal musculature, left greater than right with involvement of the left lateral epidural space at the L5 level resulting in moderate spinal canal stenosis  - started cycle 1 epcoritamab (1/10/25). Check daily CRS labs, daily TLS labs  - Patient with lethargy and confusion 1/16/25, start Dexamethasone 10mg IV every 8 hours for 3 doses to treat her for ICANS as per Dr. Salcido.  - Patient A+Ox3 on 1/17/25, will continue dex for 3 more doses and hold Epkinly while GOC discussion continues     HEME  #Leukocytosis  -WBC 25.4 on admit  -most likely secondary to colitis vs. Dex  #Anemia  -Transfuse if Hgb<7  -Monitor CBC daily     CARDS   - LVEF 57%. Vegetation noted on prior echo.  # chronic HFpEF    -  C/w BB, strict I and O's   #Acute decompensated heart failure in the setting of severe MR and AI  -ECHO (1/14): EF normal, Severe MVR, Severely dilated LA, Moderate to severe AVR, compared to prior exams new severe MR and AI   - Consulted HF (1/14): 40mg lasix BID IV, maintain K>4, Mg >2, strict I/O, daily weights  - Stopped lasix 1/15/25 as per HF  - cardiac MRI (1/16/25) c/w possible myocarditis per HF  # Paroxysmal Afib    - Continue home amiodarone, metoprolol XR, and ppx Eliquis  # Hypertension   - stop amlodipine as per HF. Consider starting lisinopril     PULM  #Acute hypoxic Resp failure    #Aspiration Pneumonia  #ADHF  - Doesn't wear O2 at baseline, wean as tolerated  - Patient likely with aspiration pneumonia status post colonoscopy on 12/30/2024.  She was supine laying on her left side which is where the infiltrate was shown on chest x-ray.  Concern for aspiration during procedure  - S/p 7 day course of Zosyn at OSH  - CXR on admission shows with mild-to-moderate degree of pulmonary interstitial edema. Small left and right pleural effusion  - S/p bipap for acute hypoxic respiratory failure at OSH  - S/p intubation in 11/2024 for pneumonia and CHF exacerbation  - Repeat CXR (1/11) with pulmonary congestion, small left pleural effusion  - ongoing diuresis with BID IV lasix 40mg. Stopped lasix 1/15/25, continue to hold per HF 1/16  - c/f possible aspiration pneumonia. Resumed Zosyn 1/16/25-present (allergy listed but previously tolerated)     FEN/GI  Admit wt: 64 kg, current wt: 62.4 kg (1/16/25)  F: PRN  E: PRN  N: low path diet, minced and moist with thin liquids, only when patient alert with appropriate mentation  #GURINDER  -Cr baseline 0.9, now uptrended to 1.45. Found to have acute urinary retention 1/15. Ddx includes obstructive GURINDER, cardiorenal    -maki placed 1/15/25  # Hypercalcemia, improving  - sCa 12.6 on admit, 1/10: 12.4, 1/12: 10.5  - Increase mIVF to 100ml/hr, stopped fluids 1/10 with  consideration of hx of CHF  - Zometa 4mg x1 (1/9)  - s/p Calcitonin BID x4 doses (1/10-1/12)  #Hyponatremia  :: 2/2 fluid volume overload  - Initiate fluid restriction of 1L (1/12-1/17), removed per RDN recs so patient can get supplement drinks  - Restart home NaCl tabs 1g BID, Dc'd on 1/14 due to possibly being hypervolemic hyponatremia, may improve with diuresising per HF, though HF currently (1/16) recommending holding diuresis  - urine studies 1/13/25: Urine Na 14, urine Cr 90, urine osmol 589  - Monitor daily  #Emesis: swallow evaluation performed 1/15. 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 She denies any increase or change in her neuropathy symptoms. Per Neurology at OSH  - Continue supportive measures    - No further neurological testing needed at this point   #Encephalopathy  #altered mental status  - UA/UC (1/13): enteric bacilli   - UA/UC (1/15): negative  - blood cx (1/15) NGTD  -MRI brain (1/15): no evidence of acute hemorrhage, mass lesion or acute Infarction. No evidence of intracranial metastatic disease within limitations of a noncontrast enhanced examination.  - Suspect driven by other medical co-morbidities rather than ICANS. ICE score 9/10 (lost point due to pt unable to hold pen to write a sentence). Pt unable to do ICE score 1/16, pt refusing ICE score 1/17    ID  # Prophy: ACV, fluconazole  # C. diff colitis with pan colitis, resolved   - CT A/P (12/27): mild pancolitis, most pronounced distal rectosigmoid   - Colonoscopy (12/30): pseudomembranous enterocolitis, nearly resolved  - S/p PO Vanco at OSH, completed PO vancomycin 125mg qid  course on admit with last doses 1/13/25     MSK   # Lumbar Spinal stenosis    - Started on Oxy 5 mg PRN, inc'd to 10mg PRN (1/11)  - cont with Robaxin 500 mg TID PRN     PSYCH   # Anxiety   - C/w Buspar 7.5 mg TID and Cymbalta  - consider Supportive Onc consult      ENDO   # Hyperthyroidism   - C/w methimazole      DISPO  - Full code  - R Mediport  - Primary Oncologist: Dr. Mac     Patient seen, examined, and discussed with Dr. Brannon Rush PA-C

## 2025-01-17 NOTE — CARE PLAN
Addended by: ADELA OGDEN on: 7/27/2022 03:33 PM     Modules accepted: Orders     The patient's goals for the shift include      The clinical goals for the shift include Pt will remain HDS

## 2025-01-17 NOTE — PROGRESS NOTES
"Nutrition Follow Up Assessment  Nutrition Assessment      Since first nutrition visit, today is day #8 of Epcoritamab. 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 Also now with c/f myocarditis.    SLP did evaluate pt on 01/15--recs for minced/moist diet with thin liquids.    Nutrition History:  This writer attempted to meet with pt earlier today, pt did briefly open her eyes, though did not answer any questions.     Noted ongoing poor PO, only consumed 25% of applesauce this am. Also noted to be refusing medications.    Per this writer's conversation with PA today, plans for goals of care discussion this afternoon with pt's family. PA okay'd this writer discontinuing 1L FR pt had been on d/t hyponatremia.    Anthropometrics:  Height: 158.9 cm (5' 2.56\")   Weight: 62.4 kg (137 lb 9.1 oz)   BMI (Calculated): 24.71  IBW/kg (Dietitian Calculated): 52.3 kg  Percent of IBW: 119 %       Admission Weight Trend:  01/08-64.0 kg (admit wt)  01/16-62.4 kg (current wt)--total of 2.5% wt loss since admit (significant), though was previously undergoing diuresis with Lasix since admit    Nutrition Focused Physical Exam Findings:  defer: completed at a prior nutrition visit  Edema:  Edema: +2 mild  Edema Location: upper and lower extremities  Physical Findings:  Skin: Negative    Nutrition Significant Labs:  CBC Trend:   Results from last 7 days   Lab Units 01/17/25  0115 01/16/25  0517 01/15/25  0539 01/14/25  0419   WBC AUTO x10*3/uL 20.8* 28.7* 21.0* 19.0*   RBC AUTO x10*6/uL 2.19* 1.98* 2.38* 2.85*   HEMOGLOBIN g/dL 8.9* 9.8* 9.4* 9.0*   HEMATOCRIT % 21.8* 19.7* 23.4* 24.6*   MCV fL 100 100 98 86   PLATELETS AUTO x10*3/uL 317 404 417 393   BMP Trend:   Results from last 7 days   Lab Units 01/17/25  0115 01/16/25  1849 01/16/25  0517 01/15/25  1911   GLUCOSE mg/dL 130* 124* 153* 122*   CALCIUM mg/dL 9.1 9.2 9.7 9.3   SODIUM mmol/L 133* 130* 128* 129*   POTASSIUM mmol/L 4.3 4.4 4.3 4.3   CO2 " mmol/L 31 29 28 29   CHLORIDE mmol/L 94* 92* 91* 92*   BUN mg/dL 57* 61* 59* 55*   CREATININE mg/dL 1.45* 1.58* 1.63* 1.51*   Liver Function Trend:   Results from last 7 days   Lab Units 01/17/25  0115 01/16/25  0517 01/15/25  0539 01/14/25  0419   ALK PHOS U/L 78 90 77 81   AST U/L 24 23 18 20   ALT U/L 25 24 22 20   BILIRUBIN TOTAL mg/dL 0.5 0.5 0.5 0.4   Renal Lab Trend:   Results from last 7 days   Lab Units 01/17/25  0115 01/16/25  1849 01/16/25  0517 01/15/25  1911   POTASSIUM mmol/L 4.3 4.4 4.3 4.3   PHOSPHORUS mg/dL 3.6 3.5 3.2 3.3   SODIUM mmol/L 133* 130* 128* 129*   MAGNESIUM mg/dL 2.96* 3.19* 3.24* 2.90*   EGFR mL/min/1.73m*2 35* 32* 30* 33*   BUN mg/dL 57* 61* 59* 55*   CREATININE mg/dL 1.45* 1.58* 1.63* 1.51*      Medications:  Scheduled medications  acyclovir, 400 mg, oral, q12h VREONICA  allopurinol, 300 mg, oral, Daily  amiodarone, 200 mg, oral, Daily  [Held by provider] amLODIPine, 5 mg, oral, Daily  apixaban, 5 mg, oral, BID  busPIRone, 7.5 mg, oral, TID  dexAMETHasone, 10 mg, intravenous, q8h  DULoxetine, 20 mg, oral, Daily  fluconazole, 400 mg, oral, Daily  [Held by provider] furosemide, 40 mg, intravenous, q12h  lidocaine, 1 patch, transdermal, Daily  magnesium oxide, 400 mg, oral, q12h VERONICA  methIMAzole, 2.5 mg, oral, Daily  metoprolol succinate XL, 100 mg, oral, Daily  pantoprazole, 40 mg, oral, Daily before breakfast  piperacillin-tazobactam, 3.375 g, intravenous, q6h  rOPINIRole, 0.5 mg, oral, Nightly    PRN medications  PRN medications: alteplase, diclofenac sodium, ipratropium-albuteroL, melatonin, methocarbamol, moisturizing mouth, ondansetron, oxyCODONE    I/O:   Last BM Date: 01/16/25; Stool Appearance: Soft (01/16/25 2100)    Dietary Orders (From admission, onward)       Start     Ordered    01/17/25 1347  Oral nutritional supplements  Until discontinued        Question Answer Comment   Deliver with Lunch    Deliver with Dinner    Select supplement: Magic Cup        01/17/25 7856     01/17/25 1345  Adult diet Regular; Minced and moist 5  Diet effective now        Question Answer Comment   Diet type Regular    Texture Minced and moist 5        01/17/25 1344    01/09/25 1244  Oral nutritional supplements  Until discontinued        Question Answer Comment   Deliver with All meals    Select supplement: Ensure Plus        01/09/25 1243    01/08/25 2134  May Participate in Room Service  ( ROOM SERVICE MAY PARTICIPATE)  Once        Question:  .  Answer:  Yes    01/08/25 2133                Estimated Needs:   Total Energy Estimated Needs (kCal): ~5941-6965  Method for Estimating Needs: MSJ (1067) x ~1.3-1.5  Total Protein Estimated Needs (g): 65+  Method for Estimating Needs: 55 (IBW) x ~1.2g/kg+  Total Fluid Estimated Needs (mL): 1ml/kcal or per MD/team        Nutrition Diagnosis   Malnutrition Diagnosis  Patient has Malnutrition Diagnosis: Yes  Diagnosis Status: Ongoing  Malnutrition Diagnosis: Severe malnutrition related to chronic disease or condition (CA, CHF)  As Evidenced by: pt estimated to be consuming <75% estimated energy needs x >1 month with areas of severe muscle and fat losses noted during nutrition exam    Additional Assessment Information: Considering severe malnutrition, will re-try addition of Ensure Plus for added nutrition (pt had not been getting the Ensure d/t the FR) + also try addition of Magic Cup BID.       Nutrition Interventions/Recommendations     1. Continue PO diet, only as tolerated/as medically appropriate, per SLP recs.  --D/t poor PO with severe malnutrition, RDN discontinued 1L FR + Low Microbial Diet guidelines.  --RDN to provide Ensure Plus TID, Magic Cup BID for added nutrition.    2. Agree with GOC discussion; if aggressive care to continue, considering poor PO with severe malnutrition, will need to consider initiation of supplemental nutrition support, though RDN does ? if this would add to pt's quality-of-life at this point in time.    3. Daily weights.         Nutrition Prescription for Oral Nutrition: ~5638-2371 kcals/day, 65+ g pro/day        Nutrition Interventions:   Food and/or Nutrient Delivery Interventions  Interventions: Meals and snacks, Medical food supplement  Meals and Snacks: Texture-modified diet  Goal: Minced/Moist Diet, per SLP  Medical Food Supplement: Commercial beverage, Commercial food  Goal: Ensure Plus TID (350 kcals, 13g pro each) + Magic Cup BID (290 kcals, 9g pro each)    Coordination of Nutrition Care by a Nutrition Professional  Collaboration and Referral of Nutrition Care: Collaboration by nutrition professional with other providers  Goal: mal heme PA    Nutrition Education: n/a--pt lethargic       Nutrition Monitoring and Evaluation   Food/Nutrient Related History Monitoring  Monitoring and Evaluation Plan: Amount of food  Amount of Food: Estimated amout of food, Medical food intake  Criteria: consume at least 50% of meals/snacks/supplements    Body Composition/Growth/Weight History  Monitoring and Evaluation Plan: Weight  Weight: Measured weight  Criteria: maintain stable wt    Biochemical Data, Medical Tests and Procedures  Monitoring and Evaluation Plan: Electrolyte/renal panel, Glucose/endocrine profile  Electrolyte and Renal Panel: Magnesium, Phosphorus, Potassium, Sodium  Criteria: WNL  Glucose/Endocrine Profile: Glucose, casual, Glucose, fasting  Criteria: WNL            Time Spent (min): 30 minutes

## 2025-01-17 NOTE — PROGRESS NOTES
Physical Therapy    Physical Therapy Treatment    Patient Name: Simi Sandoval  MRN: [id number]  Department: [facility]  Room: [facility]3004-A  Today's Date: 1/17/2025  Time Calculation  Start Time: 0945  Stop Time: 0955  Time Calculation (min): 10 min    Assessment/Plan   PT Assessment  Barriers to Discharge Home: Physical needs, Caregiver assistance  Caregiver Assistance: Caregiver assistance needed per identified barriers - however, level of patient's required assistance exceeds assistance available at home  Physical Needs: Stair navigation into home limited by function/safety, Stair navigation to access bed limited by function/safety, Stair navigation to access bath limited by function/safety, Ambulating household distances limited by function/safety, High falls risk due to function or environment, 24hr mobility assistance needed  Evaluation/Treatment Tolerance: Patient limited by fatigue  Medical Staff Made Aware: Yes  End of Session Communication: Bedside nurse  Assessment Comment: Pt repositioned during session. Mobility to EOB deferred due to supine BP. RN aware. Pt tolerates session fairly  End of Session Patient Position: Bed, 3 rail up, Alarm on     PT Plan  Treatment/Interventions: Bed mobility, Transfer training, Gait training, Balance training, Stair training, Neuromuscular re-education, Strengthening, Range of motion, Endurance training, Therapeutic exercise, Therapeutic activity, Home exercise program, Positioning, Postural re-education  PT Plan: Ongoing PT  PT Frequency: 3 times per week  PT Discharge Recommendations: Moderate intensity level of continued care  Equipment Recommended upon Discharge:  (TBD)  PT Recommended Transfer Status: Total assist      General Visit Information:   PT  Visit  PT Received On: 01/17/25  General  Family/Caregiver Present: No  Co-Treatment: OT  Co-Treatment Reason: AMPAC>10, to maximize pt safety and therapeutic potential in pt requiring 2 skilled hands  Prior to Session  Communication: Bedside nurse  Patient Position Received: Bed, 3 rail up, Alarm on  Preferred Learning Style: verbal, visual  General Comment: Pt is lethargic during session with minimal responses. Pt's BP 96/59 in supine, deferred mobility to EOB.    Subjective   Precautions:  Precautions  Medical Precautions: Fall precautions  Precautions Comment: contact plus, protective       Objective   Pain:  Pain Assessment  Pain Assessment: 0-10  0-10 (Numeric) Pain Score:  (Did not rate pain during session, grimaces with mobilizing LEs to reposition)  Cognition:  Cognition  Orientation Level:  (oriented to person, unable to assess further due pt being lethargic)      Bed Mobility  Bed Mobility: Yes  Bed Mobility 1  Bed Mobility 1: Rolling left  Level of Assistance 1: Dependent, Maximum verbal cues  Bed Mobility Comments 1: use of draw sheet to assist  Bed Mobility 2  Bed Mobility  2: Scooting (to boost up in bed with draw sheet)  Level of Assistance 2: Dependent (x2)    Ambulation/Gait Training  Ambulation/Gait Training Performed: No  Transfers  Transfer: No (unsafe to attempt at this time)    Stairs  Stairs: No    Outcome Measures:  [facility] Basic Mobility  Turning from your back to your side while in a flat bed without using bedrails: Total  Moving from lying on your back to sitting on the side of a flat bed without using bedrails: Total  Moving to and from bed to chair (including a wheelchair): Total  Standing up from a chair using your arms (e.g. wheelchair or bedside chair): Total  To walk in hospital room: Total  Climbing 3-5 steps with railing: Total  Basic Mobility - Total Score: 6    Education Documentation  Precautions, taught by Christen Galo PT at 1/17/2025  2:48 PM.  Learner: Patient  Readiness: Acceptance  Method: Explanation  Response: Needs Reinforcement  Comment: rolling, position    Mobility Training, taught by Christen Galo PT at 1/17/2025  2:48 PM.  Learner: Patient  Readiness: Acceptance  Method:  Explanation  Response: Needs Reinforcement  Comment: rolling, position    Education Comments  No comments found.        OP EDUCATION:       Encounter Problems       Encounter Problems (Active)       Balance       STG - Maintains static sitting balance with upper extremity support and SBA and dynamic sitting balance with upper extremity support with min A (Progressing)       Start:  01/14/25    Expected End:  01/28/25               Mobility       STG - Patient will ambulate >5 ft with mod A and FWW with no LOB, progress as able and appropriate  (Not Progressing)       Start:  01/14/25    Expected End:  01/28/25            Pt will be able to sit on EOB for ~10 minutes with CGA-SBA and no signs of fatigue or SOB  (Progressing)       Start:  01/14/25    Expected End:  01/28/25               PT Transfers       STG - Patient will perform bed mobility mod A  (Progressing)       Start:  01/14/25    Expected End:  01/28/25            STG - Patient will transfer sit to and from stand with mod A and FWW  (Not Progressing)       Start:  01/14/25    Expected End:  01/28/25               Pain - Adult              01/17/25 at 2:51 PM - Christen Galo, PT'

## 2025-01-17 NOTE — PROGRESS NOTES
Occupational Therapy    OT Treatment    Patient Name: Simi Sandoval  MRN: [id number]  Department: [facility]  Room: [facility][note 3[facility]3004-A  Today's Date: 1/17/2025  Time Calculation  Start Time: 0945  Stop Time: 0955  Time Calculation (min): 10 min        Assessment:  Prognosis: Fair  Barriers to Discharge Home: Caregiver assistance, Physical needs  Caregiver Assistance: Caregiver assistance needed per identified barriers - however, level of patient's required assistance exceeds assistance available at home  Physical Needs: 24hr mobility assistance needed, 24hr ADL assistance needed, High falls risk due to function or environment, Ambulating household distances limited by function/safety, In-home setup navigation limited by function/safety  Evaluation/Treatment Tolerance: Patient limited by pain (limited by cognitive status)  Medical Staff Made Aware: Yes  End of Session Communication: Bedside nurse  End of Session Patient Position: Bed, 3 rail up, Alarm on  OT Assessment Results: Decreased ADL status, Decreased upper extremity strength, Decreased upper extremity range of motion, Decreased cognition, Decreased endurance, Decreased functional mobility, Decreased IADLs, Decreased trunk control for functional activities  Prognosis: Fair  Barriers to Discharge:  (medical barriers)  Evaluation/Treatment Tolerance: Patient limited by pain (limited by cognitive status)  Medical Staff Made Aware: Yes  Strengths: Premorbid level of function  Barriers to Participation: Comorbidities  Plan:  Treatment Interventions: ADL retraining, Functional transfer training, UE strengthening/ROM, Endurance training, Cognitive reorientation, Patient/family training, Equipment evaluation/education, Compensatory technique education  OT Frequency: 2 times per week  OT Discharge Recommendations: Moderate intensity level of continued care, 24 hr supervision due to cognition  Equipment Recommended upon Discharge:  (TBD)  OT Recommended Transfer Status: Assist  of 2  OT - OK to Discharge: Yes  Treatment Interventions: ADL retraining, Functional transfer training, UE strengthening/ROM, Endurance training, Cognitive reorientation, Patient/family training, Equipment evaluation/education, Compensatory technique education    Subjective   Previous Visit Info:  OT Last Visit  OT Received On: 01/17/25  General:  General  Reason for Referral: left flank pain, left  sided abdominal pain  and diarrhea  Past Medical History Relevant to Rehab: DLBCL of left breast, A-fib (on Eliquis), pulmonary hypertension, Hysterectomy, Thyroid disease,  CHF, NSTEMI, 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 Wincing and yelling out in pain with movement of LEs. Hypotension noted, RN aware  Precautions:  Medical Precautions: Fall precautions  Precautions Comment: contact plus, protective      Vital Signs Comment: During OT: at start of session, BP 96/59 in supine, deferred EOB mobility and RN notified     Pain:  Pain Assessment  Pain Assessment: 0-10  0-10 (Numeric) Pain Score:  (does not report pain but withdraws/grimaces with movement of LEs)  Pain Interventions: Repositioned    Objective    Cognition:  Cognition  Overall Cognitive Status: Impaired  Arousal/Alertness: Delayed responses to stimuli  Orientation Level:  (states her name, otherwise unable to assess 2/2 lethargy)  Following Commands:  (follows 25% of 1 step commands)  Safety Judgment: Unable to assess  Problem Solving: Unable to assess  Cognition Comments: Keeps eyes closed most of session  Coordination:     Activities of Daily Living:    UE Dressing  UE Dressing Level of Assistance:  Dependent  UE Dressing Where Assessed: Bed level  UE Dressing Comments: Gown management  LE Dressing  LE Dressing: Yes  Sock Level of Assistance: Dependent  LE Dressing Where Assessed: Bed level    Bed Mobility/Transfers: Bed Mobility  Bed Mobility: Yes  Bed Mobility 1  Bed Mobility 1: Rolling left  Level of Assistance 1: Dependent  Bed Mobility Comments 1: Despite increased cueing for mechanics, total A required due to cognitive status  Bed Mobility 2  Bed Mobility  2: Scooting  Level of Assistance 2: Dependent  Bed Mobility Comments 2: Boost toward HOB in supine    Transfers  Transfer: No (unable to safely assess at this time)    Outcome Measures:[facility] Daily Activity  Putting on and taking off regular lower body clothing: Total  Bathing (including washing, rinsing, drying): Total  Putting on and taking off regular upper body clothing: Total  Toileting, which includes using toilet, bedpan or urinal: Total  Taking care of personal grooming such as brushing teeth: Total  Eating Meals: Total  Daily Activity - Total Score: 6      Brief Confusion Assessment Method (bCAM)  Feature 1: Altered Mental Status or Fluctuating Course: Yes/unable to assess  Feature 2: Inattention: > 1 error  Feature 3: Altered Level of Consciousness: RASS does not equal 0  CAM Result: CAM +    Education Documentation  Body Mechanics, taught by Celeste Damico OT at 1/17/2025  1:19 PM.  Learner: Patient  Readiness: Acceptance  Method: Explanation  Response: Needs Reinforcement    ADL Training, taught by Celeste Damico OT at 1/17/2025  1:19 PM.  Learner: Patient  Readiness: Acceptance  Method: Explanation  Response: Needs Reinforcement      Goals:  Encounter Problems       Encounter Problems (Active)       ADLs       Patient will complete daily grooming tasks with independent level of assistance and PRN adaptive equipment while standing. (Not Progressing)       Start:  01/14/25    Expected End:  02/04/25            Patient will demonstrate  lower body dressing with modified independent level of assistancedonning and doffing all LE clothes  with PRN adaptive equipment (Not Progressing)       Start:  01/14/25    Expected End:  02/04/25            Patient will perform toileting tasks, including hygiene and clothing management with independent level of assistance (Not Progressing)       Start:  01/14/25    Expected End:  02/04/25               COGNITION/SAFETY       Patient will follow 100% Multistep commands to allow improved ADL performance. (Not Progressing)       Start:  01/14/25    Expected End:  02/04/25            Patient will demonstrated orientation x 4. (Not Progressing)       Start:  01/14/25    Expected End:  02/04/25               EXERCISE/STRENGTHENING       pt will be IND with BUE HEP for increasing functional strength and activity tolerance required for ADL completion (Not Progressing)       Start:  01/14/25    Expected End:  02/04/25               MOBILITY       pt will safely demonstrate functional mobility, to/from bathroom and at other household distances, navigating around environmental barriers with LRD and mod I  (Not Progressing)       Start:  01/14/25    Expected End:  02/04/25               TRANSFERS       Patient will demonstrate functional transfers with least restrictive device with modified independent level of assistance. (Not Progressing)       Start:  01/14/25    Expected End:  02/04/25

## 2025-01-17 NOTE — CARE PLAN
The patient's goals for the shift include      The clinical goals for the shift include Pt will remain HDS

## 2025-01-18 LAB
ABO GROUP (TYPE) IN BLOOD: NORMAL
ALBUMIN SERPL BCP-MCNC: 2.4 G/DL (ref 3.4–5)
ALP SERPL-CCNC: 85 U/L (ref 33–136)
ALT SERPL W P-5'-P-CCNC: 50 U/L (ref 7–45)
ANION GAP SERPL CALC-SCNC: 14 MMOL/L (ref 10–20)
ANTIBODY SCREEN: NORMAL
AST SERPL W P-5'-P-CCNC: 42 U/L (ref 9–39)
BACTERIA UR CULT: ABNORMAL
BASOPHILS # BLD AUTO: 0 X10*3/UL (ref 0–0.1)
BASOPHILS NFR BLD AUTO: 0 %
BILIRUB DIRECT SERPL-MCNC: 0.2 MG/DL (ref 0–0.3)
BILIRUB SERPL-MCNC: 0.5 MG/DL (ref 0–1.2)
BUN SERPL-MCNC: 46 MG/DL (ref 6–23)
CALCIUM SERPL-MCNC: 8.7 MG/DL (ref 8.6–10.6)
CHLORIDE SERPL-SCNC: 98 MMOL/L (ref 98–107)
CO2 SERPL-SCNC: 30 MMOL/L (ref 21–32)
CREAT SERPL-MCNC: 1.36 MG/DL (ref 0.5–1.05)
CRP SERPL-MCNC: 1.54 MG/DL
EGFRCR SERPLBLD CKD-EPI 2021: 38 ML/MIN/1.73M*2
EOSINOPHIL # BLD AUTO: 0 X10*3/UL (ref 0–0.4)
EOSINOPHIL NFR BLD AUTO: 0 %
ERYTHROCYTE [DISTWIDTH] IN BLOOD BY AUTOMATED COUNT: 15.6 % (ref 11.5–14.5)
FERRITIN SERPL-MCNC: 576 NG/ML (ref 8–150)
FIBRINOGEN PPP-MCNC: 268 MG/DL (ref 200–400)
GLUCOSE SERPL-MCNC: 189 MG/DL (ref 74–99)
HCT VFR BLD AUTO: 25.5 % (ref 36–46)
HGB BLD-MCNC: 9 G/DL (ref 12–16)
IMM GRANULOCYTES # BLD AUTO: 0.04 X10*3/UL (ref 0–0.5)
IMM GRANULOCYTES NFR BLD AUTO: 0.8 % (ref 0–0.9)
LDH SERPL L TO P-CCNC: 481 U/L (ref 84–246)
LYMPHOCYTES # BLD AUTO: 0.01 X10*3/UL (ref 0.8–3)
LYMPHOCYTES NFR BLD AUTO: 0.2 %
MAGNESIUM SERPL-MCNC: 2.5 MG/DL (ref 1.6–2.4)
MCH RBC QN AUTO: 34 PG (ref 26–34)
MCHC RBC AUTO-ENTMCNC: 35.3 G/DL (ref 32–36)
MCV RBC AUTO: 96 FL (ref 80–100)
MONOCYTES # BLD AUTO: 0.13 X10*3/UL (ref 0.05–0.8)
MONOCYTES NFR BLD AUTO: 2.6 %
NEUTROPHILS # BLD AUTO: 4.84 X10*3/UL (ref 1.6–5.5)
NEUTROPHILS NFR BLD AUTO: 96.4 %
NRBC BLD-RTO: 0 /100 WBCS (ref 0–0)
OVALOCYTES BLD QL SMEAR: NORMAL
PHOSPHATE SERPL-MCNC: 2.7 MG/DL (ref 2.5–4.9)
PLATELET # BLD AUTO: 300 X10*3/UL (ref 150–450)
POLYCHROMASIA BLD QL SMEAR: NORMAL
POTASSIUM SERPL-SCNC: 3.4 MMOL/L (ref 3.5–5.3)
PROT SERPL-MCNC: 4 G/DL (ref 6.4–8.2)
RBC # BLD AUTO: 2.65 X10*6/UL (ref 4–5.2)
RBC MORPH BLD: NORMAL
RH FACTOR (ANTIGEN D): NORMAL
ROULEAUX BLD QL SMEAR: PRESENT
SODIUM SERPL-SCNC: 139 MMOL/L (ref 136–145)
URATE SERPL-MCNC: 4.4 MG/DL (ref 2.3–6.7)
WBC # BLD AUTO: 25.1 X10*3/UL (ref 4.4–11.3)

## 2025-01-18 PROCEDURE — 2500000001 HC RX 250 WO HCPCS SELF ADMINISTERED DRUGS (ALT 637 FOR MEDICARE OP)

## 2025-01-18 PROCEDURE — 2500000004 HC RX 250 GENERAL PHARMACY W/ HCPCS (ALT 636 FOR OP/ED)

## 2025-01-18 PROCEDURE — 2500000002 HC RX 250 W HCPCS SELF ADMINISTERED DRUGS (ALT 637 FOR MEDICARE OP, ALT 636 FOR OP/ED)

## 2025-01-18 PROCEDURE — 86140 C-REACTIVE PROTEIN: CPT

## 2025-01-18 PROCEDURE — 86901 BLOOD TYPING SEROLOGIC RH(D): CPT

## 2025-01-18 PROCEDURE — 82248 BILIRUBIN DIRECT: CPT

## 2025-01-18 PROCEDURE — 84550 ASSAY OF BLOOD/URIC ACID: CPT

## 2025-01-18 PROCEDURE — 2500000005 HC RX 250 GENERAL PHARMACY W/O HCPCS: Performed by: PHYSICIAN ASSISTANT

## 2025-01-18 PROCEDURE — 85384 FIBRINOGEN ACTIVITY: CPT

## 2025-01-18 PROCEDURE — 82728 ASSAY OF FERRITIN: CPT

## 2025-01-18 PROCEDURE — 80053 COMPREHEN METABOLIC PANEL: CPT

## 2025-01-18 PROCEDURE — 83735 ASSAY OF MAGNESIUM: CPT

## 2025-01-18 PROCEDURE — 99233 SBSQ HOSP IP/OBS HIGH 50: CPT | Performed by: STUDENT IN AN ORGANIZED HEALTH CARE EDUCATION/TRAINING PROGRAM

## 2025-01-18 PROCEDURE — 83615 LACTATE (LD) (LDH) ENZYME: CPT

## 2025-01-18 PROCEDURE — 84100 ASSAY OF PHOSPHORUS: CPT

## 2025-01-18 PROCEDURE — 2500000004 HC RX 250 GENERAL PHARMACY W/ HCPCS (ALT 636 FOR OP/ED): Performed by: PHYSICIAN ASSISTANT

## 2025-01-18 PROCEDURE — 1170000001 HC PRIVATE ONCOLOGY ROOM DAILY

## 2025-01-18 PROCEDURE — 85025 COMPLETE CBC W/AUTO DIFF WBC: CPT

## 2025-01-18 PROCEDURE — 2500000001 HC RX 250 WO HCPCS SELF ADMINISTERED DRUGS (ALT 637 FOR MEDICARE OP): Performed by: NURSE PRACTITIONER

## 2025-01-18 RX ORDER — DEXAMETHASONE SODIUM PHOSPHATE 10 MG/ML
10 INJECTION INTRAMUSCULAR; INTRAVENOUS EVERY 8 HOURS
Status: DISCONTINUED | OUTPATIENT
Start: 2025-01-18 | End: 2025-01-19

## 2025-01-18 RX ORDER — HYDROMORPHONE HYDROCHLORIDE 1 MG/ML
0.2 INJECTION, SOLUTION INTRAMUSCULAR; INTRAVENOUS; SUBCUTANEOUS ONCE AS NEEDED
Status: COMPLETED | OUTPATIENT
Start: 2025-01-18 | End: 2025-01-18

## 2025-01-18 RX ORDER — DEXAMETHASONE SODIUM PHOSPHATE 10 MG/ML
10 INJECTION INTRAMUSCULAR; INTRAVENOUS EVERY 8 HOURS
Status: DISCONTINUED | OUTPATIENT
Start: 2025-01-18 | End: 2025-01-18

## 2025-01-18 RX ORDER — POTASSIUM CHLORIDE 29.8 MG/ML
40 INJECTION INTRAVENOUS ONCE
Status: COMPLETED | OUTPATIENT
Start: 2025-01-18 | End: 2025-01-18

## 2025-01-18 RX ORDER — HYDROMORPHONE HYDROCHLORIDE 1 MG/ML
0.2 INJECTION, SOLUTION INTRAMUSCULAR; INTRAVENOUS; SUBCUTANEOUS
Status: DISCONTINUED | OUTPATIENT
Start: 2025-01-18 | End: 2025-01-19

## 2025-01-18 RX ADMIN — BUSPIRONE HYDROCHLORIDE 7.5 MG: 15 TABLET ORAL at 21:52

## 2025-01-18 RX ADMIN — ROPINIROLE 0.5 MG: 0.5 TABLET, FILM COATED ORAL at 21:51

## 2025-01-18 RX ADMIN — HYDROMORPHONE HYDROCHLORIDE 0.2 MG: 1 INJECTION, SOLUTION INTRAMUSCULAR; INTRAVENOUS; SUBCUTANEOUS at 01:00

## 2025-01-18 RX ADMIN — ACYCLOVIR 400 MG: 400 TABLET ORAL at 09:18

## 2025-01-18 RX ADMIN — PIPERACILLIN SODIUM AND TAZOBACTAM SODIUM 3.38 G: 3; .375 INJECTION, SOLUTION INTRAVENOUS at 03:55

## 2025-01-18 RX ADMIN — METHIMAZOLE 2.5 MG: 5 TABLET ORAL at 09:18

## 2025-01-18 RX ADMIN — DULOXETINE HYDROCHLORIDE 20 MG: 20 CAPSULE, DELAYED RELEASE ORAL at 09:19

## 2025-01-18 RX ADMIN — APIXABAN 5 MG: 5 TABLET, FILM COATED ORAL at 09:18

## 2025-01-18 RX ADMIN — ACYCLOVIR 400 MG: 400 TABLET ORAL at 21:51

## 2025-01-18 RX ADMIN — BUSPIRONE HYDROCHLORIDE 7.5 MG: 15 TABLET ORAL at 09:18

## 2025-01-18 RX ADMIN — APIXABAN 5 MG: 5 TABLET, FILM COATED ORAL at 21:51

## 2025-01-18 RX ADMIN — DEXAMETHASONE SODIUM PHOSPHATE 10 MG: 10 INJECTION INTRAMUSCULAR; INTRAVENOUS at 01:00

## 2025-01-18 RX ADMIN — PIPERACILLIN SODIUM AND TAZOBACTAM SODIUM 3.38 G: 3; .375 INJECTION, SOLUTION INTRAVENOUS at 21:51

## 2025-01-18 RX ADMIN — METOPROLOL SUCCINATE 100 MG: 50 TABLET, EXTENDED RELEASE ORAL at 09:18

## 2025-01-18 RX ADMIN — PIPERACILLIN SODIUM AND TAZOBACTAM SODIUM 3.38 G: 3; .375 INJECTION, SOLUTION INTRAVENOUS at 09:24

## 2025-01-18 RX ADMIN — POTASSIUM CHLORIDE 40 MEQ: 29.8 INJECTION, SOLUTION INTRAVENOUS at 09:24

## 2025-01-18 RX ADMIN — PIPERACILLIN SODIUM AND TAZOBACTAM SODIUM 3.38 G: 3; .375 INJECTION, SOLUTION INTRAVENOUS at 15:08

## 2025-01-18 RX ADMIN — DEXAMETHASONE SODIUM PHOSPHATE 10 MG: 10 INJECTION INTRAMUSCULAR; INTRAVENOUS at 16:57

## 2025-01-18 RX ADMIN — LIDOCAINE 4% 1 PATCH: 40 PATCH TOPICAL at 09:20

## 2025-01-18 RX ADMIN — DEXAMETHASONE SODIUM PHOSPHATE 10 MG: 10 INJECTION INTRAMUSCULAR; INTRAVENOUS at 09:17

## 2025-01-18 RX ADMIN — FLUCONAZOLE 400 MG: 200 TABLET ORAL at 09:18

## 2025-01-18 RX ADMIN — OXYCODONE HYDROCHLORIDE 10 MG: 5 TABLET ORAL at 14:29

## 2025-01-18 RX ADMIN — BUSPIRONE HYDROCHLORIDE 7.5 MG: 15 TABLET ORAL at 15:07

## 2025-01-18 RX ADMIN — AMIODARONE HYDROCHLORIDE 200 MG: 200 TABLET ORAL at 09:19

## 2025-01-18 RX ADMIN — ALLOPURINOL 300 MG: 300 TABLET ORAL at 09:18

## 2025-01-18 ASSESSMENT — COGNITIVE AND FUNCTIONAL STATUS - GENERAL
CLIMB 3 TO 5 STEPS WITH RAILING: TOTAL
MOBILITY SCORE: 9
MOVING TO AND FROM BED TO CHAIR: A LOT
HELP NEEDED FOR BATHING: TOTAL
MOVING TO AND FROM BED TO CHAIR: A LOT
TOILETING: TOTAL
PERSONAL GROOMING: TOTAL
MOVING TO AND FROM BED TO CHAIR: A LOT
TOILETING: TOTAL
EATING MEALS: TOTAL
MOBILITY SCORE: 9
STANDING UP FROM CHAIR USING ARMS: TOTAL
MOBILITY SCORE: 9
DRESSING REGULAR UPPER BODY CLOTHING: A LOT
TOILETING: TOTAL
TURNING FROM BACK TO SIDE WHILE IN FLAT BAD: A LOT
EATING MEALS: TOTAL
TURNING FROM BACK TO SIDE WHILE IN FLAT BAD: A LOT
MOVING FROM LYING ON BACK TO SITTING ON SIDE OF FLAT BED WITH BEDRAILS: A LOT
HELP NEEDED FOR BATHING: TOTAL
HELP NEEDED FOR BATHING: TOTAL
CLIMB 3 TO 5 STEPS WITH RAILING: TOTAL
PERSONAL GROOMING: TOTAL
DRESSING REGULAR UPPER BODY CLOTHING: A LOT
STANDING UP FROM CHAIR USING ARMS: TOTAL
DRESSING REGULAR UPPER BODY CLOTHING: A LOT
DAILY ACTIVITIY SCORE: 7
TURNING FROM BACK TO SIDE WHILE IN FLAT BAD: A LOT
DAILY ACTIVITIY SCORE: 7
DRESSING REGULAR LOWER BODY CLOTHING: TOTAL
EATING MEALS: TOTAL
MOVING FROM LYING ON BACK TO SITTING ON SIDE OF FLAT BED WITH BEDRAILS: A LOT
MOVING FROM LYING ON BACK TO SITTING ON SIDE OF FLAT BED WITH BEDRAILS: A LOT
DRESSING REGULAR LOWER BODY CLOTHING: TOTAL
WALKING IN HOSPITAL ROOM: TOTAL
DRESSING REGULAR LOWER BODY CLOTHING: TOTAL
DAILY ACTIVITIY SCORE: 7
STANDING UP FROM CHAIR USING ARMS: TOTAL
PERSONAL GROOMING: TOTAL
CLIMB 3 TO 5 STEPS WITH RAILING: TOTAL
WALKING IN HOSPITAL ROOM: TOTAL
WALKING IN HOSPITAL ROOM: TOTAL

## 2025-01-18 ASSESSMENT — PAIN SCALES - GENERAL
PAINLEVEL_OUTOF10: 0 - NO PAIN
PAINLEVEL_OUTOF10: 0 - NO PAIN
PAINLEVEL_OUTOF10: 10 - WORST POSSIBLE PAIN
PAINLEVEL_OUTOF10: 8
PAINLEVEL_OUTOF10: 3
PAINLEVEL_OUTOF10: 3
PAINLEVEL_OUTOF10: 0 - NO PAIN

## 2025-01-18 ASSESSMENT — PAIN - FUNCTIONAL ASSESSMENT
PAIN_FUNCTIONAL_ASSESSMENT: 0-10

## 2025-01-18 ASSESSMENT — PAIN DESCRIPTION - LOCATION: LOCATION: GENERALIZED

## 2025-01-18 NOTE — CARE PLAN
Problem: Respiratory  Goal: Clear secretions with interventions this shift  Outcome: Progressing  Goal: Minimize anxiety/maximize coping throughout shift  Outcome: Progressing  Goal: Minimal/no exertional discomfort or dyspnea this shift  Outcome: Progressing  Goal: No signs of respiratory distress (eg. Use of accessory muscles. Peds grunting)  Outcome: Progressing  Goal: Patent airway maintained this shift  Outcome: Progressing  Goal: Tolerate mechanical ventilation evidenced by VS/agitation level this shift  Outcome: Progressing  Goal: Tolerate pulmonary toileting this shift  Outcome: Progressing  Goal: Verbalize decreased shortness of breath this shift  Outcome: Progressing  Goal: Wean oxygen to maintain O2 saturation per order/standard this shift  Outcome: Progressing  Goal: Increase self care and/or family involvement in next 24 hours  Outcome: Progressing     Problem: Fall/Injury  Goal: Not fall by end of shift  Outcome: Progressing  Goal: Be free from injury by end of the shift  Outcome: Progressing  Goal: Verbalize understanding of personal risk factors for fall in the hospital  Outcome: Progressing  Goal: Verbalize understanding of risk factor reduction measures to prevent injury from fall in the home  Outcome: Progressing  Goal: Use assistive devices by end of the shift  Outcome: Progressing  Goal: Pace activities to prevent fatigue by end of the shift  Outcome: Progressing     Problem: Infection related to problem list condition  Goal: Infection will resolve through treatment  Outcome: Progressing     Problem: Skin  Goal: Decreased wound size/increased tissue granulation at next dressing change  Outcome: Progressing  Flowsheets (Taken 1/18/2025 1120)  Decreased wound size/increased tissue granulation at next dressing change: Protective dressings over bony prominences  Goal: Participates in plan/prevention/treatment measures  Outcome: Progressing  Goal: Prevent/manage excess moisture  Outcome:  Progressing  Flowsheets (Taken 1/18/2025 1120)  Prevent/manage excess moisture: Moisturize dry skin  Goal: Prevent/minimize sheer/friction injuries  Outcome: Progressing  Flowsheets (Taken 1/18/2025 1120)  Prevent/minimize sheer/friction injuries:   HOB 30 degrees or less   Turn/reposition every 2 hours/use positioning/transfer devices   Use pull sheet  Goal: Promote/optimize nutrition  Outcome: Progressing  Flowsheets (Taken 1/18/2025 1120)  Promote/optimize nutrition:   Assist with feeding   Monitor/record intake including meals   Offer water/supplements/favorite foods  Goal: Promote skin healing  Outcome: Progressing  Flowsheets (Taken 1/18/2025 1120)  Promote skin healing:   Assess skin/pad under line(s)/device(s)   Protective dressings over bony prominences     Problem: Pain  Goal: Takes deep breaths with improved pain control throughout the shift  Outcome: Progressing  Goal: Turns in bed with improved pain control throughout the shift  Outcome: Progressing  Goal: Walks with improved pain control throughout the shift  Outcome: Progressing  Goal: Performs ADL's with improved pain control throughout shift  Outcome: Progressing  Goal: Participates in PT with improved pain control throughout the shift  Outcome: Progressing  Goal: Free from opioid side effects throughout the shift  Outcome: Progressing  Goal: Free from acute confusion related to pain meds throughout the shift  Outcome: Progressing     Problem: Pain - Adult  Goal: Verbalizes/displays adequate comfort level or baseline comfort level  Outcome: Progressing     Problem: Safety - Adult  Goal: Free from fall injury  Outcome: Progressing     Problem: Discharge Planning  Goal: Discharge to home or other facility with appropriate resources  Outcome: Progressing     Problem: Chronic Conditions and Co-morbidities  Goal: Patient's chronic conditions and co-morbidity symptoms are monitored and maintained or improved  Outcome: Progressing   The patient's goals for  the shift include      The clinical goals for the shift include Patient will be hemodynamically stable

## 2025-01-18 NOTE — PROGRESS NOTES
01/18/25 1500   Discharge Planning   Living Arrangements Children   Support Systems Children   Assistance Needed ADLs   Type of Residence Private residence   Who is requesting discharge planning? Patient   Home or Post Acute Services In home services   Type of Home Care Services Hospice   Expected Discharge Disposition [facility]   Does the patient need discharge transport arranged? Yes     LSW notified by provider that pt/ family would like to pursue comfort care and hospice. LSW met with pt and son Ronald at bedside and reviewed hospice services and settings. Pt's son expressed they would like pt to go to [facility] if appropriate. LSW sent referral in [facility] to call son to schedule. LSW to cont to follow.

## 2025-01-19 VITALS
TEMPERATURE: 97.2 F | HEART RATE: 60 BPM | SYSTOLIC BLOOD PRESSURE: 104 MMHG | DIASTOLIC BLOOD PRESSURE: 59 MMHG | BODY MASS INDEX: 24.41 KG/M2 | OXYGEN SATURATION: 98 % | WEIGHT: 137.79 LBS | RESPIRATION RATE: 16 BRPM | HEIGHT: 63 IN

## 2025-01-19 LAB
ALBUMIN SERPL BCP-MCNC: 2.1 G/DL (ref 3.4–5)
ALP SERPL-CCNC: 86 U/L (ref 33–136)
ALT SERPL W P-5'-P-CCNC: 47 U/L (ref 7–45)
ANION GAP SERPL CALC-SCNC: 14 MMOL/L (ref 10–20)
AST SERPL W P-5'-P-CCNC: 33 U/L (ref 9–39)
BACTERIA BLD CULT: NORMAL
BACTERIA BLD CULT: NORMAL
BASOPHILS # BLD MANUAL: 0 X10*3/UL (ref 0–0.1)
BASOPHILS NFR BLD MANUAL: 0 %
BILIRUB DIRECT SERPL-MCNC: 0.2 MG/DL (ref 0–0.3)
BILIRUB SERPL-MCNC: 0.5 MG/DL (ref 0–1.2)
BUN SERPL-MCNC: 45 MG/DL (ref 6–23)
CALCIUM SERPL-MCNC: 8.6 MG/DL (ref 8.6–10.6)
CHLORIDE SERPL-SCNC: 100 MMOL/L (ref 98–107)
CO2 SERPL-SCNC: 29 MMOL/L (ref 21–32)
CREAT SERPL-MCNC: 1.43 MG/DL (ref 0.5–1.05)
CRP SERPL-MCNC: 0.78 MG/DL
EGFRCR SERPLBLD CKD-EPI 2021: 36 ML/MIN/1.73M*2
EOSINOPHIL # BLD MANUAL: 0 X10*3/UL (ref 0–0.4)
EOSINOPHIL NFR BLD MANUAL: 0 %
ERYTHROCYTE [DISTWIDTH] IN BLOOD BY AUTOMATED COUNT: 18.8 % (ref 11.5–14.5)
FERRITIN SERPL-MCNC: 580 NG/ML (ref 8–150)
FIBRINOGEN PPP-MCNC: 211 MG/DL (ref 200–400)
GLUCOSE SERPL-MCNC: 178 MG/DL (ref 74–99)
HCT VFR BLD AUTO: 19 % (ref 36–46)
HGB BLD-MCNC: 7.5 G/DL (ref 12–16)
IMM GRANULOCYTES # BLD AUTO: 0.12 X10*3/UL (ref 0–0.5)
IMM GRANULOCYTES NFR BLD AUTO: 2.7 % (ref 0–0.9)
LDH SERPL L TO P-CCNC: 489 U/L (ref 84–246)
LYMPHOCYTES # BLD MANUAL: 0.56 X10*3/UL (ref 0.8–3)
LYMPHOCYTES NFR BLD MANUAL: 2.5 %
MAGNESIUM SERPL-MCNC: 2.32 MG/DL (ref 1.6–2.4)
MCH RBC QN AUTO: 41.7 PG (ref 26–34)
MCHC RBC AUTO-ENTMCNC: 39.5 G/DL (ref 32–36)
MCV RBC AUTO: 106 FL (ref 80–100)
MONOCYTES # BLD MANUAL: 1.31 X10*3/UL (ref 0.05–0.8)
MONOCYTES NFR BLD MANUAL: 5.8 %
NEUTS SEG # BLD MANUAL: 20.63 X10*3/UL (ref 1.6–5)
NEUTS SEG NFR BLD MANUAL: 91.7 %
NRBC BLD-RTO: 0.4 /100 WBCS (ref 0–0)
PHOSPHATE SERPL-MCNC: 2.7 MG/DL (ref 2.5–4.9)
PLATELET # BLD AUTO: 255 X10*3/UL (ref 150–450)
POTASSIUM SERPL-SCNC: 3.9 MMOL/L (ref 3.5–5.3)
PROT SERPL-MCNC: 4 G/DL (ref 6.4–8.2)
RBC # BLD AUTO: 1.8 X10*6/UL (ref 4–5.2)
RBC MORPH BLD: ABNORMAL
SODIUM SERPL-SCNC: 139 MMOL/L (ref 136–145)
TOTAL CELLS COUNTED BLD: 120
URATE SERPL-MCNC: 4 MG/DL (ref 2.3–6.7)
WBC # BLD AUTO: 22.5 X10*3/UL (ref 4.4–11.3)

## 2025-01-19 PROCEDURE — 2500000001 HC RX 250 WO HCPCS SELF ADMINISTERED DRUGS (ALT 637 FOR MEDICARE OP)

## 2025-01-19 PROCEDURE — 80053 COMPREHEN METABOLIC PANEL: CPT

## 2025-01-19 PROCEDURE — 86140 C-REACTIVE PROTEIN: CPT

## 2025-01-19 PROCEDURE — 2500000005 HC RX 250 GENERAL PHARMACY W/O HCPCS

## 2025-01-19 PROCEDURE — 84100 ASSAY OF PHOSPHORUS: CPT

## 2025-01-19 PROCEDURE — 51702 INSERT TEMP BLADDER CATH: CPT

## 2025-01-19 PROCEDURE — 2500000005 HC RX 250 GENERAL PHARMACY W/O HCPCS: Performed by: PHYSICIAN ASSISTANT

## 2025-01-19 PROCEDURE — 2500000004 HC RX 250 GENERAL PHARMACY W/ HCPCS (ALT 636 FOR OP/ED): Performed by: PHYSICIAN ASSISTANT

## 2025-01-19 PROCEDURE — 2500000002 HC RX 250 W HCPCS SELF ADMINISTERED DRUGS (ALT 637 FOR MEDICARE OP, ALT 636 FOR OP/ED)

## 2025-01-19 PROCEDURE — 84550 ASSAY OF BLOOD/URIC ACID: CPT

## 2025-01-19 PROCEDURE — 83735 ASSAY OF MAGNESIUM: CPT

## 2025-01-19 PROCEDURE — 83615 LACTATE (LD) (LDH) ENZYME: CPT

## 2025-01-19 PROCEDURE — 85384 FIBRINOGEN ACTIVITY: CPT

## 2025-01-19 PROCEDURE — 82728 ASSAY OF FERRITIN: CPT

## 2025-01-19 PROCEDURE — 1170000001 HC PRIVATE ONCOLOGY ROOM DAILY

## 2025-01-19 PROCEDURE — 85007 BL SMEAR W/DIFF WBC COUNT: CPT

## 2025-01-19 PROCEDURE — 82248 BILIRUBIN DIRECT: CPT

## 2025-01-19 PROCEDURE — 2500000001 HC RX 250 WO HCPCS SELF ADMINISTERED DRUGS (ALT 637 FOR MEDICARE OP): Performed by: NURSE PRACTITIONER

## 2025-01-19 PROCEDURE — 99233 SBSQ HOSP IP/OBS HIGH 50: CPT | Performed by: STUDENT IN AN ORGANIZED HEALTH CARE EDUCATION/TRAINING PROGRAM

## 2025-01-19 PROCEDURE — 2500000004 HC RX 250 GENERAL PHARMACY W/ HCPCS (ALT 636 FOR OP/ED)

## 2025-01-19 PROCEDURE — 85027 COMPLETE CBC AUTOMATED: CPT

## 2025-01-19 RX ORDER — OLANZAPINE 5 MG/1
5 TABLET, ORALLY DISINTEGRATING ORAL EVERY 6 HOURS PRN
Status: DISCONTINUED | OUTPATIENT
Start: 2025-01-19 | End: 2025-01-22 | Stop reason: HOSPADM

## 2025-01-19 RX ORDER — DEXAMETHASONE SODIUM PHOSPHATE 10 MG/ML
10 INJECTION INTRAMUSCULAR; INTRAVENOUS EVERY 8 HOURS
Status: DISCONTINUED | OUTPATIENT
Start: 2025-01-19 | End: 2025-01-22 | Stop reason: HOSPADM

## 2025-01-19 RX ORDER — HYDROMORPHONE HYDROCHLORIDE 1 MG/ML
0.2 INJECTION, SOLUTION INTRAMUSCULAR; INTRAVENOUS; SUBCUTANEOUS
Status: DISCONTINUED | OUTPATIENT
Start: 2025-01-19 | End: 2025-01-22 | Stop reason: HOSPADM

## 2025-01-19 RX ORDER — LORAZEPAM 2 MG/ML
0.5 INJECTION INTRAMUSCULAR EVERY 4 HOURS PRN
Status: DISCONTINUED | OUTPATIENT
Start: 2025-01-19 | End: 2025-01-22 | Stop reason: HOSPADM

## 2025-01-19 RX ORDER — HYDROMORPHONE HYDROCHLORIDE 1 MG/ML
0.4 INJECTION, SOLUTION INTRAMUSCULAR; INTRAVENOUS; SUBCUTANEOUS
Status: DISCONTINUED | OUTPATIENT
Start: 2025-01-19 | End: 2025-01-22 | Stop reason: HOSPADM

## 2025-01-19 RX ORDER — ALBUTEROL SULFATE 0.83 MG/ML
2.5 SOLUTION RESPIRATORY (INHALATION) EVERY 4 HOURS PRN
Status: DISCONTINUED | OUTPATIENT
Start: 2025-01-19 | End: 2025-01-22 | Stop reason: HOSPADM

## 2025-01-19 RX ADMIN — ROPINIROLE 0.5 MG: 0.5 TABLET, FILM COATED ORAL at 21:47

## 2025-01-19 RX ADMIN — DEXAMETHASONE SODIUM PHOSPHATE 10 MG: 10 INJECTION INTRAMUSCULAR; INTRAVENOUS at 16:41

## 2025-01-19 RX ADMIN — Medication 2 L/MIN: at 15:48

## 2025-01-19 RX ADMIN — FLUCONAZOLE 400 MG: 200 TABLET ORAL at 09:16

## 2025-01-19 RX ADMIN — DULOXETINE HYDROCHLORIDE 20 MG: 20 CAPSULE, DELAYED RELEASE ORAL at 09:17

## 2025-01-19 RX ADMIN — METHIMAZOLE 2.5 MG: 5 TABLET ORAL at 09:17

## 2025-01-19 RX ADMIN — PIPERACILLIN SODIUM AND TAZOBACTAM SODIUM 3.38 G: 3; .375 INJECTION, SOLUTION INTRAVENOUS at 15:00

## 2025-01-19 RX ADMIN — BUSPIRONE HYDROCHLORIDE 7.5 MG: 15 TABLET ORAL at 21:47

## 2025-01-19 RX ADMIN — ACYCLOVIR 400 MG: 400 TABLET ORAL at 09:16

## 2025-01-19 RX ADMIN — PANTOPRAZOLE SODIUM 40 MG: 40 TABLET, DELAYED RELEASE ORAL at 06:17

## 2025-01-19 RX ADMIN — LIDOCAINE 4% 1 PATCH: 40 PATCH TOPICAL at 09:17

## 2025-01-19 RX ADMIN — DEXAMETHASONE SODIUM PHOSPHATE 10 MG: 10 INJECTION INTRAMUSCULAR; INTRAVENOUS at 00:55

## 2025-01-19 RX ADMIN — ALLOPURINOL 300 MG: 300 TABLET ORAL at 09:16

## 2025-01-19 RX ADMIN — OXYCODONE HYDROCHLORIDE 10 MG: 5 TABLET ORAL at 04:18

## 2025-01-19 RX ADMIN — PIPERACILLIN SODIUM AND TAZOBACTAM SODIUM 3.38 G: 3; .375 INJECTION, SOLUTION INTRAVENOUS at 04:05

## 2025-01-19 RX ADMIN — BUSPIRONE HYDROCHLORIDE 7.5 MG: 15 TABLET ORAL at 15:00

## 2025-01-19 RX ADMIN — PIPERACILLIN SODIUM AND TAZOBACTAM SODIUM 3.38 G: 3; .375 INJECTION, SOLUTION INTRAVENOUS at 09:25

## 2025-01-19 RX ADMIN — AMIODARONE HYDROCHLORIDE 200 MG: 200 TABLET ORAL at 09:17

## 2025-01-19 RX ADMIN — BUSPIRONE HYDROCHLORIDE 7.5 MG: 15 TABLET ORAL at 09:17

## 2025-01-19 RX ADMIN — Medication 3 L/MIN: at 20:44

## 2025-01-19 RX ADMIN — DEXAMETHASONE SODIUM PHOSPHATE 10 MG: 10 INJECTION INTRAMUSCULAR; INTRAVENOUS at 09:16

## 2025-01-19 RX ADMIN — APIXABAN 5 MG: 5 TABLET, FILM COATED ORAL at 09:16

## 2025-01-19 RX ADMIN — METOPROLOL SUCCINATE 100 MG: 50 TABLET, EXTENDED RELEASE ORAL at 09:16

## 2025-01-19 ASSESSMENT — PAIN SCALES - GENERAL
PAINLEVEL_OUTOF10: 0 - NO PAIN
PAINLEVEL_OUTOF10: 7
PAINLEVEL_OUTOF10: 3
PAINLEVEL_OUTOF10: 0 - NO PAIN

## 2025-01-19 ASSESSMENT — PAIN - FUNCTIONAL ASSESSMENT
PAIN_FUNCTIONAL_ASSESSMENT: 0-10

## 2025-01-19 ASSESSMENT — COGNITIVE AND FUNCTIONAL STATUS - GENERAL
PERSONAL GROOMING: TOTAL
CLIMB 3 TO 5 STEPS WITH RAILING: TOTAL
CLIMB 3 TO 5 STEPS WITH RAILING: TOTAL
TURNING FROM BACK TO SIDE WHILE IN FLAT BAD: A LOT
TOILETING: TOTAL
MOVING TO AND FROM BED TO CHAIR: A LOT
MOVING FROM LYING ON BACK TO SITTING ON SIDE OF FLAT BED WITH BEDRAILS: A LOT
WALKING IN HOSPITAL ROOM: TOTAL
DRESSING REGULAR LOWER BODY CLOTHING: TOTAL
DRESSING REGULAR UPPER BODY CLOTHING: A LOT
PERSONAL GROOMING: TOTAL
STANDING UP FROM CHAIR USING ARMS: TOTAL
EATING MEALS: TOTAL
MOBILITY SCORE: 9
MOVING FROM LYING ON BACK TO SITTING ON SIDE OF FLAT BED WITH BEDRAILS: A LOT
MOVING TO AND FROM BED TO CHAIR: A LOT
DAILY ACTIVITIY SCORE: 7
EATING MEALS: TOTAL
MOBILITY SCORE: 9
TOILETING: TOTAL
TURNING FROM BACK TO SIDE WHILE IN FLAT BAD: A LOT
WALKING IN HOSPITAL ROOM: TOTAL
STANDING UP FROM CHAIR USING ARMS: TOTAL
HELP NEEDED FOR BATHING: TOTAL
DAILY ACTIVITIY SCORE: 7
DRESSING REGULAR LOWER BODY CLOTHING: TOTAL
HELP NEEDED FOR BATHING: TOTAL
DRESSING REGULAR UPPER BODY CLOTHING: A LOT

## 2025-01-19 NOTE — CARE PLAN
Problem: Respiratory  Goal: Clear secretions with interventions this shift  Outcome: Progressing  Goal: Minimize anxiety/maximize coping throughout shift  Outcome: Progressing  Goal: Minimal/no exertional discomfort or dyspnea this shift  Outcome: Progressing  Goal: No signs of respiratory distress (eg. Use of accessory muscles. Peds grunting)  Outcome: Progressing  Goal: Patent airway maintained this shift  Outcome: Progressing  Goal: Tolerate mechanical ventilation evidenced by VS/agitation level this shift  Outcome: Progressing  Goal: Tolerate pulmonary toileting this shift  Outcome: Progressing  Goal: Verbalize decreased shortness of breath this shift  Outcome: Progressing  Goal: Wean oxygen to maintain O2 saturation per order/standard this shift  Outcome: Progressing  Goal: Increase self care and/or family involvement in next 24 hours  Outcome: Progressing     Problem: Fall/Injury  Goal: Not fall by end of shift  Outcome: Progressing  Goal: Be free from injury by end of the shift  Outcome: Progressing  Goal: Verbalize understanding of personal risk factors for fall in the hospital  Outcome: Progressing  Goal: Verbalize understanding of risk factor reduction measures to prevent injury from fall in the home  Outcome: Progressing  Goal: Use assistive devices by end of the shift  Outcome: Progressing  Goal: Pace activities to prevent fatigue by end of the shift  Outcome: Progressing     Problem: Infection related to problem list condition  Goal: Infection will resolve through treatment  Outcome: Progressing     Problem: Skin  Goal: Decreased wound size/increased tissue granulation at next dressing change  Outcome: Progressing  Flowsheets (Taken 1/18/2025 1120)  Decreased wound size/increased tissue granulation at next dressing change: Protective dressings over bony prominences  Goal: Participates in plan/prevention/treatment measures  Outcome: Progressing  Flowsheets (Taken 1/19/2025 0743)  Participates in  plan/prevention/treatment measures: Elevate heels  Goal: Prevent/manage excess moisture  Outcome: Progressing  Flowsheets (Taken 1/19/2025 0723)  Prevent/manage excess moisture:   Moisturize dry skin   Monitor for/manage infection if present   Cleanse incontinence/protect with barrier cream  Goal: Prevent/minimize sheer/friction injuries  Outcome: Progressing  Flowsheets (Taken 1/19/2025 0723)  Prevent/minimize sheer/friction injuries:   HOB 30 degrees or less   Turn/reposition every 2 hours/use positioning/transfer devices   Use pull sheet  Goal: Promote/optimize nutrition  Outcome: Progressing  Flowsheets (Taken 1/19/2025 0723)  Promote/optimize nutrition:   Assist with feeding   Monitor/record intake including meals   Offer water/supplements/favorite foods   Consume > 50% meals/supplements  Goal: Promote skin healing  Outcome: Progressing  Flowsheets (Taken 1/19/2025 0723)  Promote skin healing:   Assess skin/pad under line(s)/device(s)   Protective dressings over bony prominences   Turn/reposition every 2 hours/use positioning/transfer devices   Rotate device position/do not position patient on device     Problem: Pain  Goal: Takes deep breaths with improved pain control throughout the shift  Outcome: Progressing  Goal: Turns in bed with improved pain control throughout the shift  Outcome: Progressing  Goal: Walks with improved pain control throughout the shift  Outcome: Progressing  Goal: Performs ADL's with improved pain control throughout shift  Outcome: Progressing  Goal: Participates in PT with improved pain control throughout the shift  Outcome: Progressing  Goal: Free from opioid side effects throughout the shift  Outcome: Progressing  Goal: Free from acute confusion related to pain meds throughout the shift  Outcome: Progressing     Problem: Pain - Adult  Goal: Verbalizes/displays adequate comfort level or baseline comfort level  Outcome: Progressing     Problem: Safety - Adult  Goal: Free from fall  injury  Outcome: Progressing     Problem: Discharge Planning  Goal: Discharge to home or other facility with appropriate resources  Outcome: Progressing     Problem: Chronic Conditions and Co-morbidities  Goal: Patient's chronic conditions and co-morbidity symptoms are monitored and maintained or improved  Outcome: Progressing   The patient's goals for the shift include      The clinical goals for the shift include Patient will be hemodynamically stable

## 2025-01-19 NOTE — PROGRESS NOTES
"Simi Sandoval is a 89 y.o. female on day 11 of admission presenting with DLBCL (diffuse large B cell lymphoma).    Subjective   Patient A+Ox3 today, but slightly confused, remains lethargic. Mouth very dry, able to speak more easily after taking sips of water. Denies any pain. Currently on 4L NC. Denies CP, SOB, abd pain, N/V/D/C. Whitaker in place.     Spoke with patient's sons Ethan and Kne this afternoon about next steps of hospice care. Discussed stopping medications that do not provide comfort, and changing patient code status to DNRCC. Ethan requested to meet with SW tomorrow to discuss hospice facility options.     Objective   Physical Exam  Constitutional:       General: She is not in acute distress.     Appearance: She is ill-appearing.      Comments: Lethargic but answers most questions appropriately   HENT:      Head: Normocephalic and atraumatic.      Mouth/Throat:      Mouth: Mucous membranes are dry.      Pharynx: No oropharyngeal exudate.   Eyes:      Extraocular Movements: Extraocular movements intact.      Pupils: Pupils are equal, round, and reactive to light.   Cardiovascular:      Rate and Rhythm: Normal rate and regular rhythm.      Heart sounds: Murmur heard.   Pulmonary:      Effort: Pulmonary effort is normal. No respiratory distress.      Breath sounds: No wheezing or rhonchi.      Comments: On 4L via NC  Abdominal:      General: Bowel sounds are normal. There is no distension.      Palpations: Abdomen is soft.      Tenderness: There is no abdominal tenderness.   Musculoskeletal:      Right lower leg: Edema present.      Left lower leg: Edema present.   Skin:     General: Skin is warm and dry.   Neurological:      Mental Status: She is oriented to person, place, and time.      Motor: Weakness present.       Last Recorded Vitals  Blood pressure 104/59, pulse 60, temperature 36.2 °C (97.2 °F), resp. rate 16, height 1.589 m (5' 2.56\"), weight 62.5 kg (137 lb 12.6 oz), SpO2 " 98%.  Intake/Output last 3 Shifts:  I/O last 3 completed shifts:  In: 740 (11.8 mL/kg) [P.O.:540; IV Piggyback:200]  Out: 1900 (30.4 mL/kg) [Urine:1350 (0.6 mL/kg/hr); Stool:550]  Weight: 62.5 kg     Relevant Results  Scheduled medications  busPIRone, 7.5 mg, oral, TID  dexAMETHasone, 10 mg, intravenous, q8h  DULoxetine, 20 mg, oral, Daily  lidocaine, 1 patch, transdermal, Daily  oxygen, , inhalation, Continuous - Inhalation  rOPINIRole, 0.5 mg, oral, Nightly    Continuous medications     PRN medications  PRN medications: albuterol, alteplase, diclofenac sodium, HYDROmorphone, HYDROmorphone, HYDROmorphone, LORazepam, melatonin, methocarbamol, moisturizing mouth, OLANZapine zydis, ondansetron, oxyCODONE    This patient has a central line   Reason for the central line remaining today? Parenteral medication    This patient has a urinary catheter   Reason for the urinary catheter remaining today? urinary retention/bladder outlet obstruction, acute or chronic     Malnutrition Diagnosis Status: Ongoing  Malnutrition Diagnosis: Severe malnutrition related to chronic disease or condition (CA, CHF)  As Evidenced by: pt estimated to be consuming <75% estimated energy needs x >1 month with areas of severe muscle and fat losses noted during nutrition exam  I agree with the dietitian's malnutrition diagnosis.      Assessment/Plan     Simi Sandoval is an 89-year-old F with PMH DLBCL of left breast, A-fib (on Eliquis), pulmonary hypertension, Hysterectomy, Thyroid disease,  CHF, NSTEMI, s/p uretral stent (12/13) who comes in for evaluation of left flank pain, left  sided abdominal pain and diarrhea at OSH on 12/27. Colonoscopy completed 12/30 and was found to have Cdiff colitis with pancolitis. Pt was started on Vanco for Pancolitis and was treated with zosyn for aspiration pneumonia seen on CT A/P S/P colonoscopy. CT imaging found multiple pulmonary nodules bilaterally, largest dependently on the left measuring 2.6 cm. Biopsy  of left lung nodule done 12/11/2024 showed diffuse aggressive large B-cell lymphoma, recurrent by history. Heme/Onc saw the pt at OSH and recommended to transfer to [facility] for further work up, evaluation and treatment of disease recurrence. She was started on epcoritamab ramp up, however, course complicated by acute decompensated heart failure in the setting of severe MR and AI and encephalopathy. GOC discussion held 1/18/25 with patient and family, and decision was made to pursue comfort care and hospice, and hospice consult was placed.     Update 1/19/25: confirmed with patient's sons that comfort care and hospice are now being pursued. Patient made DNRCC and comfort care order set placed.     ONC   #Diffuse large B-cell lymphoma   - Last treated with Tafasitamab 5/16/2023   - New lung nodules favoring recurrence of lymphoma back in December.    - last chemo in 2023   - Pathology from lung nodule biopsy 12/11/2024 confirmed disease recurrence.  - CT Flank 12/27/2024 shows disease progression in pulmonary nodules and new cortical lytic lesions in L4-5.   - S/p Dexamethasone 4 mg BID at OSH by Heme/Onc and ordered MRI per Oncology at OSH on admission to rule out cord compression.  - MRI 1/9: negative for cord compression, soft tissue masses involving the paraspinal musculature, left greater than right with involvement of the left lateral epidural space at the L5 level resulting in moderate spinal canal stenosis  - started cycle 1 epcoritamab (1/10/25). Check daily CRS labs, daily TLS labs  - Patient with lethargy and confusion 1/16/25, start Dexamethasone 10mg IV every 8 hours for 3 doses to treat her for ICANS as per Dr. Salcido.  - Patient A+Ox3 on 1/17/25, will continue dex q8 hours through the weekend and hold Epkinly while GOC discussion continues.     HEME  #Leukocytosis  -WBC 25.4 on admit  -most likely secondary to colitis vs. Dex  #Anemia  -Transfuse if Hgb<7  -Monitor CBC daily     CARDS   - LVEF 57%.  Vegetation noted on prior echo.  # chronic HFpEF    - C/w BB, strict I and O's   #Acute decompensated heart failure in the setting of severe MR and AI  -ECHO (1/14): EF normal, Severe MVR, Severely dilated LA, Moderate to severe AVR, compared to prior exams new severe MR and AI   - Consulted HF (1/14): 40mg lasix BID IV, maintain K>4, Mg >2, strict I/O, daily weights  - Stopped lasix 1/15/25 as per HF  - cardiac MRI (1/16/25) c/w possible myocarditis per HF  # Paroxysmal Afib    - Continue home amiodarone, metoprolol XR, and ppx Eliquis  # Hypertension   - stop amlodipine as per HF. Consider starting lisinopril     PULM  #Acute hypoxic Resp failure    #Aspiration Pneumonia  #ADHF  - Doesn't wear O2 at baseline, wean as tolerated  - Patient likely with aspiration pneumonia status post colonoscopy on 12/30/2024.  She was supine laying on her left side which is where the infiltrate was shown on chest x-ray.  Concern for aspiration during procedure  - S/p 7 day course of Zosyn at OSH  - CXR on admission shows with mild-to-moderate degree of pulmonary interstitial edema. Small left and right pleural effusion  - S/p bipap for acute hypoxic respiratory failure at OSH  - S/p intubation in 11/2024 for pneumonia and CHF exacerbation  - Repeat CXR (1/11) with pulmonary congestion, small left pleural effusion  - ongoing diuresis with BID IV lasix 40mg. Stopped lasix 1/15/25, continue to hold per HF 1/16  - c/f possible aspiration pneumonia. S/p Zosyn 1/16/25-1/19 (allergy listed but previously tolerated) stopped when patient confirmed hospice/comfort care     FEN/GI  Admit wt: 64 kg, current wt: 62.4 kg (1/16/25)  F: PRN  E: PRN  N: low path diet, minced and moist with thin liquids, only when patient alert with appropriate mentation  #GURINDER  -Cr baseline 0.9, now uptrended to 1.45. Found to have acute urinary retention 1/15. Ddx includes obstructive GURINDER, cardiorenal    -maki placed 1/15/25  # Hypercalcemia, improving  - sCa  12.6 on admit, 1/10: 12.4, 1/12: 10.5  - Increase mIVF to 100ml/hr, stopped fluids 1/10 with consideration of hx of CHF  - Zometa 4mg x1 (1/9)  - s/p Calcitonin BID x4 doses (1/10-1/12)  #Hyponatremia  :: 2/2 fluid volume overload  - Initiate fluid restriction of 1L (1/12-1/17), removed per RDN recs so patient can get supplement drinks  - Restart home NaCl tabs 1g BID, Dc'd on 1/14 due to possibly being hypervolemic hyponatremia, may improve with diuresising per HF, though HF currently (1/16) recommending holding diuresis  - urine studies 1/13/25: Urine Na 14, urine Cr 90, urine osmol 589  - Monitor daily  #Emesis: swallow evaluation performed 1/15. 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 She denies any increase or change in her neuropathy symptoms. Per Neurology at OSH  - Continue supportive measures    - No further neurological testing needed at this point   #Encephalopathy  #altered mental status  - UA/UC (1/13): enteric bacilli   - UA/UC (1/15): negative  - blood cx (1/15) NGTD  -MRI brain (1/15): no evidence of acute hemorrhage, mass lesion or acute Infarction. No evidence of intracranial metastatic disease within limitations of a noncontrast enhanced examination.  - Suspect driven by other medical co-morbidities rather than ICANS. ICE score 9/10 (lost point due to pt unable to hold pen to write a sentence). Pt unable to do ICE score 1/16, pt refusing ICE score 1/17    ID  # Prophy: ACV, fluconazole  # C. diff colitis with pan colitis, resolved   - CT A/P (12/27): mild pancolitis, most pronounced distal rectosigmoid   - Colonoscopy (12/30):  pseudomembranous enterocolitis, nearly resolved  - S/p PO Vanco at OSH, completed PO vancomycin 125mg qid course on admit with last doses 1/13/25     MSK   # Lumbar Spinal stenosis    - Started on Oxy 5 mg PRN, inc'd to 10mg PRN (1/11)  - cont with Robaxin 500 mg TID PRN     PSYCH   # Anxiety   - C/w Buspar 7.5 mg TID and Cymbalta  - consider Supportive Onc consult      ENDO   # Hyperthyroidism   - C/w methimazole      DISPO  - Full code  - R Mediport  - Primary Oncologist: Dr. Mac     Patient seen, examined, and discussed with Dr. Dany Rush, PA-C

## 2025-01-19 NOTE — SIGNIFICANT EVENT
Advanced Heart Failure Sign Off Note  Simi Sandoval is a 89 y.o. female with a PMHx of metastatic DLBCL of left breast (lytic lesions, pulm nodules, recently started on epocritamab), Afib on eliquis, HFpEF, NSTEMI?S/p ureteral stent with recent colonoscopy at OSH 12/30 c/w C.diff colitis and pan colitis, s/p recent pulm nodule(seen on CT chest/abd/pelvis) bx consistent with metastatic DLBCL transferred [place] [facility] [place] further work up and treatment of recurrence. She was initiated on epocritamab at . Unfortunately her floor course has been complicated by acute hypoxic respiratory failure in the setting of pulmonary edema. TTE demonstrated normal LVEF, severe MR, AI. HF engaged given new echo findings and to assist with diuresis. A CMR was obtained this admission that showed possible myocarditis. She was started on steroids per primary team for her malignancy, but in due to her overall poor prognosis, the decision was made to transition the patient for comfort care and refer to hospice. We will sign off. Please feel free to re-engage us should any further needs arise.    Jaden Wiggins MD   Heart Failure Fellow  PGY-4    For any questions or concerns, feel free to reach out via [employer] chat or page at [phone].

## 2025-01-20 PROCEDURE — 1170000001 HC PRIVATE ONCOLOGY ROOM DAILY

## 2025-01-20 PROCEDURE — 2500000005 HC RX 250 GENERAL PHARMACY W/O HCPCS: Performed by: PHYSICIAN ASSISTANT

## 2025-01-20 PROCEDURE — 99232 SBSQ HOSP IP/OBS MODERATE 35: CPT | Performed by: STUDENT IN AN ORGANIZED HEALTH CARE EDUCATION/TRAINING PROGRAM

## 2025-01-20 PROCEDURE — 2500000005 HC RX 250 GENERAL PHARMACY W/O HCPCS

## 2025-01-20 PROCEDURE — 2500000001 HC RX 250 WO HCPCS SELF ADMINISTERED DRUGS (ALT 637 FOR MEDICARE OP)

## 2025-01-20 PROCEDURE — 2500000004 HC RX 250 GENERAL PHARMACY W/ HCPCS (ALT 636 FOR OP/ED)

## 2025-01-20 RX ADMIN — BUSPIRONE HYDROCHLORIDE 7.5 MG: 15 TABLET ORAL at 15:25

## 2025-01-20 RX ADMIN — Medication 3.5 L/MIN: at 07:55

## 2025-01-20 RX ADMIN — HYDROMORPHONE HYDROCHLORIDE 0.4 MG: 1 INJECTION, SOLUTION INTRAMUSCULAR; INTRAVENOUS; SUBCUTANEOUS at 07:54

## 2025-01-20 RX ADMIN — DEXAMETHASONE SODIUM PHOSPHATE 10 MG: 10 INJECTION INTRAMUSCULAR; INTRAVENOUS at 01:54

## 2025-01-20 RX ADMIN — DEXAMETHASONE SODIUM PHOSPHATE 10 MG: 10 INJECTION INTRAMUSCULAR; INTRAVENOUS at 09:20

## 2025-01-20 RX ADMIN — LIDOCAINE 4% 1 PATCH: 40 PATCH TOPICAL at 09:22

## 2025-01-20 RX ADMIN — BUSPIRONE HYDROCHLORIDE 7.5 MG: 15 TABLET ORAL at 09:21

## 2025-01-20 RX ADMIN — DEXAMETHASONE SODIUM PHOSPHATE 10 MG: 10 INJECTION INTRAMUSCULAR; INTRAVENOUS at 18:01

## 2025-01-20 RX ADMIN — DULOXETINE HYDROCHLORIDE 20 MG: 20 CAPSULE, DELAYED RELEASE ORAL at 09:21

## 2025-01-20 RX ADMIN — HYDROMORPHONE HYDROCHLORIDE 0.2 MG: 1 INJECTION, SOLUTION INTRAMUSCULAR; INTRAVENOUS; SUBCUTANEOUS at 01:54

## 2025-01-20 ASSESSMENT — PAIN - FUNCTIONAL ASSESSMENT
PAIN_FUNCTIONAL_ASSESSMENT: 0-10
PAIN_FUNCTIONAL_ASSESSMENT: 0-10

## 2025-01-20 ASSESSMENT — PAIN SCALES - GENERAL
PAINLEVEL_OUTOF10: 0 - NO PAIN
PAINLEVEL_OUTOF10: 7
PAINLEVEL_OUTOF10: 6
PAINLEVEL_OUTOF10: 0 - NO PAIN

## 2025-01-20 ASSESSMENT — COGNITIVE AND FUNCTIONAL STATUS - GENERAL
MOVING TO AND FROM BED TO CHAIR: A LOT
DRESSING REGULAR LOWER BODY CLOTHING: TOTAL
TOILETING: TOTAL
MOVING FROM LYING ON BACK TO SITTING ON SIDE OF FLAT BED WITH BEDRAILS: A LOT
WALKING IN HOSPITAL ROOM: TOTAL
DRESSING REGULAR UPPER BODY CLOTHING: A LOT
CLIMB 3 TO 5 STEPS WITH RAILING: TOTAL
PERSONAL GROOMING: TOTAL
MOVING FROM LYING ON BACK TO SITTING ON SIDE OF FLAT BED WITH BEDRAILS: TOTAL
DRESSING REGULAR LOWER BODY CLOTHING: TOTAL
HELP NEEDED FOR BATHING: TOTAL
STANDING UP FROM CHAIR USING ARMS: TOTAL
DRESSING REGULAR UPPER BODY CLOTHING: TOTAL
TURNING FROM BACK TO SIDE WHILE IN FLAT BAD: TOTAL
MOVING TO AND FROM BED TO CHAIR: TOTAL
EATING MEALS: TOTAL
WALKING IN HOSPITAL ROOM: TOTAL
STANDING UP FROM CHAIR USING ARMS: TOTAL
DAILY ACTIVITIY SCORE: 7
MOBILITY SCORE: 6
TOILETING: TOTAL
MOBILITY SCORE: 9
PERSONAL GROOMING: TOTAL
CLIMB 3 TO 5 STEPS WITH RAILING: TOTAL
HELP NEEDED FOR BATHING: TOTAL
EATING MEALS: TOTAL
DAILY ACTIVITIY SCORE: 6
TURNING FROM BACK TO SIDE WHILE IN FLAT BAD: A LOT

## 2025-01-20 ASSESSMENT — PAIN DESCRIPTION - LOCATION: LOCATION: LEG

## 2025-01-20 ASSESSMENT — PAIN DESCRIPTION - ORIENTATION: ORIENTATION: RIGHT;LEFT

## 2025-01-20 NOTE — PROGRESS NOTES
"Simi Sandoval is a 89 y.o. female on day 12 of admission presenting with DLBCL (diffuse large B cell lymphoma).    Subjective   A+Ox3 today, remains slightly confused, remains lethargic. Mouth very dry, able to speak more easily after taking sips of water. Denies any pain. Currently on 4L NC. Denies CP, SOB, abd pain, N/V/D/C. Whitaker in place.     Objective   Physical Exam  Constitutional:       General: She is not in acute distress.     Appearance: She is ill-appearing.      Comments: Lethargic but answers most questions appropriately   HENT:      Head: Normocephalic and atraumatic.      Mouth/Throat:      Mouth: Mucous membranes are dry.   Eyes:      Extraocular Movements: Extraocular movements intact.      Pupils: Pupils are equal, round, and reactive to light.   Pulmonary:      Effort: Pulmonary effort is normal. No respiratory distress.      Comments: On 4L via NC  Abdominal:      General: There is no distension.   Musculoskeletal:      Right lower leg: Edema present.      Left lower leg: Edema present.      Comments: BUE edema   Skin:     General: Skin is warm and dry.   Neurological:      Mental Status: She is oriented to person, place, and time.      Motor: Weakness present.       Last Recorded Vitals  Blood pressure 103/51, pulse 60, temperature 36.3 °C (97.3 °F), temperature source Temporal, resp. rate 16, height 1.589 m (5' 2.56\"), weight 62.5 kg (137 lb 12.6 oz), SpO2 95%.  Intake/Output last 3 Shifts:  I/O last 3 completed shifts:  In: 400 (6.4 mL/kg) [P.O.:300; IV Piggyback:100]  Out: 1050 (16.8 mL/kg) [Urine:1050 (0.5 mL/kg/hr)]  Weight: 62.5 kg     Relevant Results  Scheduled medications  busPIRone, 7.5 mg, oral, TID  dexAMETHasone, 10 mg, intravenous, q8h  DULoxetine, 20 mg, oral, Daily  lidocaine, 1 patch, transdermal, Daily  oxygen, , inhalation, Continuous - Inhalation  rOPINIRole, 0.5 mg, oral, Nightly    Continuous medications     PRN medications  PRN medications: albuterol, alteplase, " diclofenac sodium, HYDROmorphone, HYDROmorphone, HYDROmorphone, LORazepam, melatonin, methocarbamol, moisturizing mouth, OLANZapine zydis, ondansetron, oxyCODONE    This patient has a central line   Reason for the central line remaining today? Parenteral medication    This patient has a urinary catheter   Reason for the urinary catheter remaining today? urinary retention/bladder outlet obstruction, acute or chronic     Malnutrition Diagnosis Status: Ongoing  Malnutrition Diagnosis: Severe malnutrition related to chronic disease or condition (CA, CHF)  As Evidenced by: pt estimated to be consuming <75% estimated energy needs x >1 month with areas of severe muscle and fat losses noted during nutrition exam  I agree with the dietitian's malnutrition diagnosis.      Assessment/Plan     Simi Sandoval is an 89-year-old F with PMH DLBCL of left breast, A-fib (on Eliquis), pulmonary hypertension, Hysterectomy, Thyroid disease,  CHF, NSTEMI, s/p uretral stent (12/13) who comes in for evaluation of left flank pain, left  sided abdominal pain and diarrhea at OSH on 12/27. Colonoscopy completed 12/30 and was found to have Cdiff colitis with pancolitis. Pt was started on Vanco for Pancolitis and was treated with zosyn for aspiration pneumonia seen on CT A/P S/P colonoscopy. CT imaging found multiple pulmonary nodules bilaterally, largest dependently on the left measuring 2.6 cm. Biopsy of left lung nodule done 12/11/2024 showed diffuse aggressive large B-cell lymphoma, recurrent by history. Heme/Onc saw the pt at OSH and recommended to transfer to [facility] for further work up, evaluation and treatment of disease recurrence. She was started on epcoritamab ramp up, however, course complicated by acute decompensated heart failure in the setting of severe MR and AI and encephalopathy. GOC discussion held 1/18/25 with patient and family, and decision was made to pursue comfort care and hospice, and hospice consult was placed.      Update 25: spoke with son Ethan, who is aware that the hospice RN will contact him tomorrow to discuss hospice arrangements. Ethan is aware he needs to select a  home in the event that the patient dies as the inpatient hospice facility cannot store or cremate the body.     ONC   #Diffuse large B-cell lymphoma   - Last treated with Tafasitamab 2023   - New lung nodules favoring recurrence of lymphoma back in December.    - last chemo in    - Pathology from lung nodule biopsy 2024 confirmed disease recurrence.  - CT Flank 2024 shows disease progression in pulmonary nodules and new cortical lytic lesions in L4-5.   - S/p Dexamethasone 4 mg BID at OSH by Heme/Onc and ordered MRI per Oncology at OSH on admission to rule out cord compression.  - MRI : negative for cord compression, soft tissue masses involving the paraspinal musculature, left greater than right with involvement of the left lateral epidural space at the L5 level resulting in moderate spinal canal stenosis  - started cycle 1 epcoritamab (1/10/25). Check daily CRS labs, daily TLS labs  - Patient with lethargy and confusion 25, start Dexamethasone 10mg IV every 8 hours for 3 doses to treat her for ICANS as per Dr. Salcido.  - Patient A+Ox3 on 25, will continue dex q8 hours through the weekend and hold Epkinly while GOC discussion continues.     HEME  #Leukocytosis  -WBC 25.4 on admit  -most likely secondary to colitis vs. Dex  #Anemia  -Transfuse if Hgb<7  -Monitor CBC daily     CARDS   - LVEF 57%. Vegetation noted on prior echo.  # chronic HFpEF    - C/w BB, strict I and O's   #Acute decompensated heart failure in the setting of severe MR and AI  -ECHO (): EF normal, Severe MVR, Severely dilated LA, Moderate to severe AVR, compared to prior exams new severe MR and AI   - Consulted HF (): 40mg lasix BID IV, maintain K>4, Mg >2, strict I/O, daily weights  - Stopped lasix 1/15/25 as per HF  - cardiac  MRI (1/16/25) c/w possible myocarditis per HF  # Paroxysmal Afib    - Continue home amiodarone, metoprolol XR, and ppx Eliquis  # Hypertension   - stop amlodipine as per HF. Consider starting lisinopril     PULM  #Acute hypoxic Resp failure    #Aspiration Pneumonia  #ADHF  - Doesn't wear O2 at baseline, wean as tolerated  - Patient likely with aspiration pneumonia status post colonoscopy on 12/30/2024.  She was supine laying on her left side which is where the infiltrate was shown on chest x-ray.  Concern for aspiration during procedure  - S/p 7 day course of Zosyn at OSH  - CXR on admission shows with mild-to-moderate degree of pulmonary interstitial edema. Small left and right pleural effusion  - S/p bipap for acute hypoxic respiratory failure at OSH  - S/p intubation in 11/2024 for pneumonia and CHF exacerbation  - Repeat CXR (1/11) with pulmonary congestion, small left pleural effusion  - ongoing diuresis with BID IV lasix 40mg. Stopped lasix 1/15/25, continue to hold per HF 1/16  - c/f possible aspiration pneumonia. S/p Zosyn 1/16/25-1/19 (allergy listed but previously tolerated) stopped when patient confirmed hospice/comfort care     FEN/GI  Admit wt: 64 kg, current wt: 62.4 kg (1/16/25)  F: PRN  E: PRN  N: low path diet, minced and moist with thin liquids, only when patient alert with appropriate mentation  #GURINDER  -Cr baseline 0.9, now uptrended to 1.45. Found to have acute urinary retention 1/15. Ddx includes obstructive GURINDER, cardiorenal    -maki placed 1/15/25  # Hypercalcemia, improving  - sCa 12.6 on admit, 1/10: 12.4, 1/12: 10.5  - Increase mIVF to 100ml/hr, stopped fluids 1/10 with consideration of hx of CHF  - Zometa 4mg x1 (1/9)  - s/p Calcitonin BID x4 doses (1/10-1/12)  #Hyponatremia  :: 2/2 fluid volume overload  - Initiate fluid restriction of 1L (1/12-1/17), removed per RDN recs so patient can get supplement drinks  - Restart home NaCl tabs 1g BID, Dc'd on 1/14 due to possibly being  hypervolemic hyponatremia, may improve with diuresising per HF, though HF currently (1/16) recommending holding diuresis  - urine studies 1/13/25: Urine Na 14, urine Cr 90, urine osmol 589  - Monitor daily  #Emesis: swallow evaluation performed 1/15. 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 She denies any increase or change in her neuropathy symptoms. Per Neurology at OSH  - Continue supportive measures    - No further neurological testing needed at this point   #Encephalopathy  #altered mental status  - UA/UC (1/13): enteric bacilli   - UA/UC (1/15): negative  - blood cx (1/15) NGTD  -MRI brain (1/15): no evidence of acute hemorrhage, mass lesion or acute Infarction. No evidence of intracranial metastatic disease within limitations of a noncontrast enhanced examination.  - Suspect driven by other medical co-morbidities rather than ICANS. ICE score 9/10 (lost point due to pt unable to hold pen to write a sentence). Pt unable to do ICE score 1/16, pt refusing ICE score 1/17    ID  # Prophy: ACV, fluconazole  # C. diff colitis with pan colitis, resolved   - CT A/P (12/27): mild pancolitis, most pronounced distal rectosigmoid   - Colonoscopy (12/30): pseudomembranous enterocolitis, nearly resolved  - S/p PO Vanco at OSH, completed PO vancomycin 125mg qid course on admit with last doses 1/13/25     MSK   # Lumbar Spinal stenosis    - Started on Oxy 5 mg PRN, inc'd to 10mg PRN (1/11)  - cont with Robaxin 500 mg TID PRN     PSYCH   # Anxiety   - C/w Buspar 7.5 mg TID and Cymbalta  - consider Supportive Onc consult      ENDO   # Hyperthyroidism   - C/w methimazole       DISPO  - Full code  - R Mediport  - Primary Oncologist: Dr. Mac     Patient seen, examined, and discussed with Dr. Loly Rush PA-C

## 2025-01-20 NOTE — CARE PLAN
Problem: Respiratory  Goal: Clear secretions with interventions this shift  Outcome: Progressing  Goal: Minimize anxiety/maximize coping throughout shift  Outcome: Progressing  Goal: Minimal/no exertional discomfort or dyspnea this shift  Outcome: Progressing  Goal: No signs of respiratory distress (eg. Use of accessory muscles. Peds grunting)  Outcome: Progressing  Goal: Patent airway maintained this shift  Outcome: Progressing  Goal: Tolerate mechanical ventilation evidenced by VS/agitation level this shift  Outcome: Progressing  Goal: Tolerate pulmonary toileting this shift  Outcome: Progressing  Goal: Verbalize decreased shortness of breath this shift  Outcome: Progressing  Goal: Wean oxygen to maintain O2 saturation per order/standard this shift  Outcome: Progressing  Goal: Increase self care and/or family involvement in next 24 hours  Outcome: Progressing     Problem: Fall/Injury  Goal: Not fall by end of shift  Outcome: Progressing  Goal: Be free from injury by end of the shift  Outcome: Progressing  Goal: Verbalize understanding of personal risk factors for fall in the hospital  Outcome: Progressing  Goal: Verbalize understanding of risk factor reduction measures to prevent injury from fall in the home  Outcome: Progressing  Goal: Use assistive devices by end of the shift  Outcome: Progressing  Goal: Pace activities to prevent fatigue by end of the shift  Outcome: Progressing     Problem: Infection related to problem list condition  Goal: Infection will resolve through treatment  Outcome: Progressing     Problem: Skin  Goal: Decreased wound size/increased tissue granulation at next dressing change  Outcome: Progressing  Flowsheets (Taken 1/18/2025 1120)  Decreased wound size/increased tissue granulation at next dressing change: Protective dressings over bony prominences  Goal: Participates in plan/prevention/treatment measures  Outcome: Progressing  Flowsheets (Taken 1/19/2025 0751)  Participates in  plan/prevention/treatment measures: Elevate heels  Goal: Prevent/manage excess moisture  Outcome: Progressing  Flowsheets (Taken 1/19/2025 0723)  Prevent/manage excess moisture:   Moisturize dry skin   Monitor for/manage infection if present   Cleanse incontinence/protect with barrier cream  Goal: Prevent/minimize sheer/friction injuries  Outcome: Progressing  Flowsheets (Taken 1/19/2025 0723)  Prevent/minimize sheer/friction injuries:   HOB 30 degrees or less   Turn/reposition every 2 hours/use positioning/transfer devices   Use pull sheet  Goal: Promote/optimize nutrition  Outcome: Progressing  Flowsheets (Taken 1/19/2025 0723)  Promote/optimize nutrition:   Assist with feeding   Monitor/record intake including meals   Offer water/supplements/favorite foods   Consume > 50% meals/supplements  Goal: Promote skin healing  Outcome: Progressing  Flowsheets (Taken 1/19/2025 0723)  Promote skin healing:   Assess skin/pad under line(s)/device(s)   Protective dressings over bony prominences   Turn/reposition every 2 hours/use positioning/transfer devices   Rotate device position/do not position patient on device     Problem: Pain  Goal: Takes deep breaths with improved pain control throughout the shift  Outcome: Progressing  Goal: Turns in bed with improved pain control throughout the shift  Outcome: Progressing  Goal: Walks with improved pain control throughout the shift  Outcome: Progressing  Goal: Performs ADL's with improved pain control throughout shift  Outcome: Progressing  Goal: Participates in PT with improved pain control throughout the shift  Outcome: Progressing  Goal: Free from opioid side effects throughout the shift  Outcome: Progressing  Goal: Free from acute confusion related to pain meds throughout the shift  Outcome: Progressing     Problem: Pain - Adult  Goal: Verbalizes/displays adequate comfort level or baseline comfort level  Outcome: Progressing     Problem: Safety - Adult  Goal: Free from fall  injury  Outcome: Progressing     Problem: Discharge Planning  Goal: Discharge to home or other facility with appropriate resources  Outcome: Progressing     Problem: Chronic Conditions and Co-morbidities  Goal: Patient's chronic conditions and co-morbidity symptoms are monitored and maintained or improved  Outcome: Progressing   The patient's goals for the shift include      The clinical goals for the shift include Patient will be hemodynamically stable

## 2025-01-20 NOTE — CARE PLAN
Problem: Respiratory  Goal: Clear secretions with interventions this shift  1/20/2025 1037 by Fabiana Fontaine RN  Outcome: Progressing  1/20/2025 1019 by Fabiana Fontaine RN  Outcome: Progressing  Goal: Minimize anxiety/maximize coping throughout shift  1/20/2025 1037 by Fabiana Fontaine RN  Outcome: Progressing  1/20/2025 1019 by Fabiana Fontaine RN  Outcome: Progressing  Goal: Minimal/no exertional discomfort or dyspnea this shift  1/20/2025 1037 by Fabiana Fontaine RN  Outcome: Progressing  1/20/2025 1019 by Fabiana Fontaine RN  Outcome: Progressing  Goal: No signs of respiratory distress (eg. Use of accessory muscles. Peds grunting)  1/20/2025 1037 by Fabiana Fontaine RN  Outcome: Progressing  1/20/2025 1019 by Fabiana Fontaine RN  Outcome: Progressing  Goal: Patent airway maintained this shift  1/20/2025 1037 by Fabiana Fontaine RN  Outcome: Progressing  1/20/2025 1019 by Fabiana Fontaine RN  Outcome: Progressing  Goal: Tolerate mechanical ventilation evidenced by VS/agitation level this shift  1/20/2025 1037 by Fabiana Fontaine RN  Outcome: Progressing  1/20/2025 1019 by Fabiana Fontaine RN  Outcome: Progressing  Goal: Tolerate pulmonary toileting this shift  1/20/2025 1037 by Fabiana Fontaine RN  Outcome: Progressing  1/20/2025 1019 by Fabiana Fontaine RN  Outcome: Progressing  Goal: Verbalize decreased shortness of breath this shift  1/20/2025 1037 by Fabiana Fontaine RN  Outcome: Progressing  1/20/2025 1019 by Fabiana Fontaine RN  Outcome: Progressing  Goal: Wean oxygen to maintain O2 saturation per order/standard this shift  1/20/2025 1037 by Fabiana Fontaine RN  Outcome: Progressing  1/20/2025 1019 by Fabiana Fontaine RN  Outcome: Progressing  Goal: Increase self care and/or family involvement in next 24 hours  1/20/2025 1037 by Fabiana Fontaine RN  Outcome: Progressing  1/20/2025 1019 by Fabiana Tomczyk, RN  Outcome: Progressing     Problem: Fall/Injury  Goal: Not fall by end of shift  1/20/2025 1037 by Fabiana Fontaine RN  Outcome:  Progressing  1/20/2025 1019 by Fabiana Fontaine RN  Outcome: Progressing  Goal: Be free from injury by end of the shift  1/20/2025 1037 by Fabiana Fontaine RN  Outcome: Progressing  1/20/2025 1019 by Fabiana Fontaine RN  Outcome: Progressing  Goal: Verbalize understanding of personal risk factors for fall in the hospital  1/20/2025 1037 by Fabiana Fontaine RN  Outcome: Progressing  1/20/2025 1019 by Fabiana Fontaine RN  Outcome: Progressing  Goal: Verbalize understanding of risk factor reduction measures to prevent injury from fall in the home  1/20/2025 1037 by Fabiana Fontaine RN  Outcome: Progressing  1/20/2025 1019 by Fabiana Fontaine RN  Outcome: Progressing  Goal: Use assistive devices by end of the shift  1/20/2025 1037 by Fabiana Fontaine RN  Outcome: Progressing  1/20/2025 1019 by Fabiana Fontaine RN  Outcome: Progressing  Goal: Pace activities to prevent fatigue by end of the shift  1/20/2025 1037 by Fabiana Fontaine RN  Outcome: Progressing  1/20/2025 1019 by Fabiana Fontaine RN  Outcome: Progressing     Problem: Infection related to problem list condition  Goal: Infection will resolve through treatment  1/20/2025 1037 by Fabiana Fontaine RN  Outcome: Progressing  1/20/2025 1019 by Fabiana Fontaine RN  Outcome: Progressing     Problem: Skin  Goal: Decreased wound size/increased tissue granulation at next dressing change  1/20/2025 1037 by Fabiana Fontaine RN  Outcome: Progressing  Flowsheets (Taken 1/18/2025 1120)  Decreased wound size/increased tissue granulation at next dressing change: Protective dressings over bony prominences  1/20/2025 1019 by Fabiana Fontaine RN  Outcome: Progressing  Flowsheets (Taken 1/18/2025 1120)  Decreased wound size/increased tissue granulation at next dressing change: Protective dressings over bony prominences  Goal: Participates in plan/prevention/treatment measures  1/20/2025 1037 by Fabiana Fontaine RN  Outcome: Progressing  Flowsheets (Taken 1/19/2025 0723)  Participates in plan/prevention/treatment  measures: Elevate heels  1/20/2025 1019 by Fabiana Fontaine RN  Outcome: Progressing  Flowsheets (Taken 1/19/2025 0723)  Participates in plan/prevention/treatment measures: Elevate heels  Goal: Prevent/manage excess moisture  1/20/2025 1037 by Fabiana Fontaine RN  Outcome: Progressing  Flowsheets (Taken 1/19/2025 0723)  Prevent/manage excess moisture:   Moisturize dry skin   Monitor for/manage infection if present   Cleanse incontinence/protect with barrier cream  1/20/2025 1019 by Fabiana Fontaine RN  Outcome: Progressing  Flowsheets (Taken 1/19/2025 0723)  Prevent/manage excess moisture:   Moisturize dry skin   Monitor for/manage infection if present   Cleanse incontinence/protect with barrier cream  Goal: Prevent/minimize sheer/friction injuries  1/20/2025 1037 by Fabiana Fontaine RN  Outcome: Progressing  Flowsheets (Taken 1/19/2025 0723)  Prevent/minimize sheer/friction injuries:   HOB 30 degrees or less   Turn/reposition every 2 hours/use positioning/transfer devices   Use pull sheet  1/20/2025 1019 by Fabiana Fontaine RN  Outcome: Progressing  Flowsheets (Taken 1/19/2025 0723)  Prevent/minimize sheer/friction injuries:   HOB 30 degrees or less   Turn/reposition every 2 hours/use positioning/transfer devices   Use pull sheet  Goal: Promote/optimize nutrition  1/20/2025 1037 by Fabiana Fontaine RN  Outcome: Progressing  Flowsheets (Taken 1/19/2025 0723)  Promote/optimize nutrition:   Assist with feeding   Monitor/record intake including meals   Offer water/supplements/favorite foods   Consume > 50% meals/supplements  1/20/2025 1019 by Fabiana Fontaine RN  Outcome: Progressing  Flowsheets (Taken 1/19/2025 0723)  Promote/optimize nutrition:   Assist with feeding   Monitor/record intake including meals   Offer water/supplements/favorite foods   Consume > 50% meals/supplements  Goal: Promote skin healing  1/20/2025 1037 by Fabiana Fontaine RN  Outcome: Progressing  Flowsheets (Taken 1/19/2025 0723)  Promote skin healing:   Assess  skin/pad under line(s)/device(s)   Protective dressings over bony prominences   Turn/reposition every 2 hours/use positioning/transfer devices   Rotate device position/do not position patient on device  1/20/2025 1019 by Fabiana Fontaine RN  Outcome: Progressing  Flowsheets (Taken 1/19/2025 0723)  Promote skin healing:   Assess skin/pad under line(s)/device(s)   Protective dressings over bony prominences   Turn/reposition every 2 hours/use positioning/transfer devices   Rotate device position/do not position patient on device     Problem: Pain  Goal: Takes deep breaths with improved pain control throughout the shift  1/20/2025 1037 by Fabiana Fontaine RN  Outcome: Progressing  1/20/2025 1019 by Fabiana Fontaine RN  Outcome: Progressing  Goal: Turns in bed with improved pain control throughout the shift  1/20/2025 1037 by Fabiana Fontaine RN  Outcome: Progressing  1/20/2025 1019 by Fabiana Fontaine RN  Outcome: Progressing  Goal: Walks with improved pain control throughout the shift  1/20/2025 1037 by Fabiana Fontaine RN  Outcome: Progressing  1/20/2025 1019 by Fabiana Fontaine RN  Outcome: Progressing  Goal: Performs ADL's with improved pain control throughout shift  1/20/2025 1037 by Fabiana Fontaine RN  Outcome: Progressing  1/20/2025 1019 by Fabiana Fontaine RN  Outcome: Progressing  Goal: Participates in PT with improved pain control throughout the shift  1/20/2025 1037 by Fabiana Fontaine RN  Outcome: Progressing  1/20/2025 1019 by Fabiana Fontaine RN  Outcome: Progressing  Goal: Free from opioid side effects throughout the shift  1/20/2025 1037 by Fabiana Fontaine RN  Outcome: Progressing  1/20/2025 1019 by Fabiana Fontaine RN  Outcome: Progressing  Goal: Free from acute confusion related to pain meds throughout the shift  1/20/2025 1037 by Fabiana Fontaine RN  Outcome: Progressing  1/20/2025 1019 by Fabiana Fontaine RN  Outcome: Progressing     Problem: Pain - Adult  Goal: Verbalizes/displays adequate comfort level or baseline comfort  level  1/20/2025 1037 by Fabiana Fontaine RN  Outcome: Progressing  1/20/2025 1019 by Fabiana Fontaine RN  Outcome: Progressing     Problem: Safety - Adult  Goal: Free from fall injury  1/20/2025 1037 by Fabiana Fontaine RN  Outcome: Progressing  1/20/2025 1019 by Fabiana Fontaine RN  Outcome: Progressing     Problem: Discharge Planning  Goal: Discharge to home or other facility with appropriate resources  1/20/2025 1037 by Fabiana Fontaine RN  Outcome: Progressing  1/20/2025 1019 by Fabiana Fontaine RN  Outcome: Progressing     Problem: Chronic Conditions and Co-morbidities  Goal: Patient's chronic conditions and co-morbidity symptoms are monitored and maintained or improved  1/20/2025 1037 by Fabiana Fontaine RN  Outcome: Progressing  1/20/2025 1019 by Fabiana Fontaine RN  Outcome: Progressing   The patient's goals for the shift include      The clinical goals for the shift include Patient will be hemodynamically stable

## 2025-01-20 NOTE — PROGRESS NOTES
Speech-Language Pathology             Therapy Communication Note     Patient Name: Simi Sandoval  MRN:  [id number]  Today's Date:  01/20/25  Missed Time: 0755      Missed Visit Reason:  Per chart review, pt now comfort measures. No further SLP services warranted at this time. Please reconsult if new concerns arise/goals change.

## 2025-01-20 NOTE — PROGRESS NOTES
Physical Therapy                 Therapy Communication Note    Patient Name: Simi Sandoval  MRN: [id number]  Department: [facility]  Room: [facility]004-A  Today's Date: 1/20/2025     Discipline: Physical Therapy    PT Missed Visit: Yes     Missed Visit Reason: Missed Visit Reason:  (pt is comfort measures and with hospice referral. Will discharge PT orders at this time. Please re-consult if there is a change in medical status)    Missed Time: Attempt

## 2025-01-20 NOTE — PROGRESS NOTES
01/20/25 1800   Discharge Planning   Living Arrangements Children   Support Systems Children   Assistance Needed total   Type of Residence Private residence   Who is requesting discharge planning? Patient   Home or Post Acute Services In home services   Type of Home Care Services Hospice   Expected Discharge Disposition HospiceMedic      Pt's family met with [facility] 1/19, but were not ready to make decision on hospice. After speaking to medical team and LSW re: prognosis and discharge options, sons report they are ready to move forward with transfer to [facility]. [facility] notfied and will return to meet with sons 1/21 930-1000 to proceed. LSW to cont to follow.

## 2025-01-21 PROCEDURE — 1170000001 HC PRIVATE ONCOLOGY ROOM DAILY

## 2025-01-21 PROCEDURE — 2500000005 HC RX 250 GENERAL PHARMACY W/O HCPCS

## 2025-01-21 PROCEDURE — 2500000004 HC RX 250 GENERAL PHARMACY W/ HCPCS (ALT 636 FOR OP/ED)

## 2025-01-21 PROCEDURE — 2500000001 HC RX 250 WO HCPCS SELF ADMINISTERED DRUGS (ALT 637 FOR MEDICARE OP)

## 2025-01-21 PROCEDURE — 2500000001 HC RX 250 WO HCPCS SELF ADMINISTERED DRUGS (ALT 637 FOR MEDICARE OP): Performed by: NURSE PRACTITIONER

## 2025-01-21 RX ADMIN — DEXAMETHASONE SODIUM PHOSPHATE 10 MG: 10 INJECTION INTRAMUSCULAR; INTRAVENOUS at 00:59

## 2025-01-21 RX ADMIN — BUSPIRONE HYDROCHLORIDE 7.5 MG: 15 TABLET ORAL at 08:21

## 2025-01-21 RX ADMIN — DEXAMETHASONE SODIUM PHOSPHATE 10 MG: 10 INJECTION INTRAMUSCULAR; INTRAVENOUS at 16:22

## 2025-01-21 RX ADMIN — Medication 3 L/MIN: at 19:57

## 2025-01-21 RX ADMIN — DULOXETINE HYDROCHLORIDE 20 MG: 20 CAPSULE, DELAYED RELEASE ORAL at 08:21

## 2025-01-21 RX ADMIN — BUSPIRONE HYDROCHLORIDE 7.5 MG: 15 TABLET ORAL at 16:23

## 2025-01-21 RX ADMIN — HYDROMORPHONE HYDROCHLORIDE 0.4 MG: 1 INJECTION, SOLUTION INTRAMUSCULAR; INTRAVENOUS; SUBCUTANEOUS at 18:42

## 2025-01-21 RX ADMIN — DEXAMETHASONE SODIUM PHOSPHATE 10 MG: 10 INJECTION INTRAMUSCULAR; INTRAVENOUS at 08:21

## 2025-01-21 RX ADMIN — OXYCODONE HYDROCHLORIDE 10 MG: 5 TABLET ORAL at 08:21

## 2025-01-21 RX ADMIN — Medication 2 L/MIN: at 08:22

## 2025-01-21 RX ADMIN — LORAZEPAM 0.5 MG: 2 INJECTION INTRAMUSCULAR; INTRAVENOUS at 02:35

## 2025-01-21 ASSESSMENT — COGNITIVE AND FUNCTIONAL STATUS - GENERAL
TOILETING: TOTAL
MOBILITY SCORE: 6
HELP NEEDED FOR BATHING: TOTAL
MOVING TO AND FROM BED TO CHAIR: TOTAL
STANDING UP FROM CHAIR USING ARMS: TOTAL
HELP NEEDED FOR BATHING: TOTAL
MOVING TO AND FROM BED TO CHAIR: TOTAL
TURNING FROM BACK TO SIDE WHILE IN FLAT BAD: TOTAL
EATING MEALS: TOTAL
WALKING IN HOSPITAL ROOM: TOTAL
DRESSING REGULAR UPPER BODY CLOTHING: TOTAL
MOBILITY SCORE: 6
TURNING FROM BACK TO SIDE WHILE IN FLAT BAD: TOTAL
DAILY ACTIVITIY SCORE: 6
CLIMB 3 TO 5 STEPS WITH RAILING: TOTAL
DRESSING REGULAR UPPER BODY CLOTHING: TOTAL
MOVING FROM LYING ON BACK TO SITTING ON SIDE OF FLAT BED WITH BEDRAILS: TOTAL
TOILETING: TOTAL
DRESSING REGULAR LOWER BODY CLOTHING: TOTAL
PERSONAL GROOMING: TOTAL
PERSONAL GROOMING: TOTAL
DAILY ACTIVITIY SCORE: 6
CLIMB 3 TO 5 STEPS WITH RAILING: TOTAL
WALKING IN HOSPITAL ROOM: TOTAL
MOVING FROM LYING ON BACK TO SITTING ON SIDE OF FLAT BED WITH BEDRAILS: TOTAL
EATING MEALS: TOTAL
STANDING UP FROM CHAIR USING ARMS: TOTAL
DRESSING REGULAR LOWER BODY CLOTHING: TOTAL

## 2025-01-21 ASSESSMENT — PAIN SCALES - WONG BAKER
WONGBAKER_NUMERICALRESPONSE: HURTS WHOLE LOT
WONGBAKER_NUMERICALRESPONSE: HURTS WORST
WONGBAKER_NUMERICALRESPONSE: NO HURT

## 2025-01-21 ASSESSMENT — PAIN SCALES - PAIN ASSESSMENT IN ADVANCED DEMENTIA (PAINAD)
NEGVOCALIZATION: REPEATED TROUBLED CALLING OUT, LOUD MOANING/GROANING, CRYING
BREATHING: OCCASIONAL LABORED BREATHING, SHORT PERIOD OF HYPERVENTILATION
BODYLANGUAGE: TENSE, DISTRESSED PACING, FIDGETING
FACIALEXPRESSION: FACIAL GRIMACING
CONSOLABILITY: UNABLE TO CONSOLE, DISTRACT OR REASSURE
TOTALSCORE: MEDICATION (SEE MAR)
TOTALSCORE: 8

## 2025-01-21 ASSESSMENT — PAIN - FUNCTIONAL ASSESSMENT: PAIN_FUNCTIONAL_ASSESSMENT: 0-10

## 2025-01-21 ASSESSMENT — PAIN SCALES - GENERAL
PAINLEVEL_OUTOF10: 8
PAINLEVEL_OUTOF10: 0 - NO PAIN
PAINLEVEL_OUTOF10: 8

## 2025-01-21 ASSESSMENT — PAIN DESCRIPTION - LOCATION
LOCATION: GENERALIZED
LOCATION: GENERALIZED

## 2025-01-21 NOTE — PROGRESS NOTES
LSW Hospice Note    Simi Sandoval is a Hospice Patient.   Hospice terminal diagnosis: B-cell lymphoma  Physician: Dr. Brannon Salcido  Visit type: Admission visit/consents signed    Comments/recommendations: LSW met with pt's maria esther Long and nadya Nicole re: hospice goals of care and discharge planning.  Both in agreement with hospice and would like placement at [facility].  No bed currently, but pt placed on the list.  Will update as soon as bed becomes available, likely later today vs tomorrow.  [facility] will follow up when bed becomes available.      Discharge Planning:  Patient to be discharged to  [facility]    The following is to be completed:  Discharge order:   State DNR signed by MD:   Nursing facility referral/transfer form:   Medication reconciliation:   PAS/RR or convalescent stay form:   Prescriptions for al narcotics/new medications:   Transportation: Will be arranged by [facility]  Other:     Plan of care reviewed with patient/family members Ciera Mast   Plan of care reviewed with hospital staff members: Linda COX, Palak RN, Dr. Salcido, Ana Paula NP     Please notify [facility] of any changes in condition. Thank you.  Office: [phone] (8 am-6:30 pm M-F and 8 am-4:30 pm weekends and holidays)   [phone] (6:30 pm-8 am M-F and 4:30 pm-8 am weekends and holidays)    MAXIMINO Turcios

## 2025-01-21 NOTE — PROGRESS NOTES
"Simi Sandoval is a 89 y.o. female on day 13 of admission presenting with DLBCL (diffuse large B cell lymphoma).    Subjective   AO to self.  She has periods of confusion but is easily reorientated.  She denies any abdominal pain  but she had some slight nausea .  Hospice  can in to meet with so's today.  Patient is awaiting bed and will be transported when available.      Objective   Physical Exam  Constitutional:       General: She is not in acute distress.     Appearance: She is ill-appearing.      Comments: Lethargic but answers most questions appropriately   HENT:      Head: Normocephalic and atraumatic.      Mouth/Throat:      Mouth: Mucous membranes are dry.   Eyes:      Extraocular Movements: Extraocular movements intact.      Pupils: Pupils are equal, round, and reactive to light.   Pulmonary:      Effort: Pulmonary effort is normal. No respiratory distress.      Comments: On 4L via NC  Abdominal:      General: There is no distension.   Musculoskeletal:      Right lower leg: Edema present.      Left lower leg: Edema present.      Comments: BUE edema   Skin:     General: Skin is warm and dry.   Neurological:      Mental Status: She is oriented to person, place, and time.      Motor: Weakness present.       Last Recorded Vitals  Blood pressure 134/81, pulse 93, temperature 36.1 °C (97 °F), temperature source Temporal, resp. rate 18, height 1.589 m (5' 2.56\"), weight 62.5 kg (137 lb 12.6 oz), SpO2 93%.  Intake/Output last 3 Shifts:  I/O last 3 completed shifts:  In: 150 (2.4 mL/kg) [P.O.:150]  Out: 1175 (18.8 mL/kg) [Urine:1175 (0.5 mL/kg/hr)]  Weight: 62.5 kg     Relevant Results  Scheduled medications  busPIRone, 7.5 mg, oral, TID  dexAMETHasone, 10 mg, intravenous, q8h  DULoxetine, 20 mg, oral, Daily  lidocaine, 1 patch, transdermal, Daily  oxygen, , inhalation, Continuous - Inhalation  rOPINIRole, 0.5 mg, oral, Nightly    Continuous medications     PRN medications  PRN medications: albuterol, " alteplase, diclofenac sodium, HYDROmorphone, HYDROmorphone, HYDROmorphone, LORazepam, melatonin, methocarbamol, moisturizing mouth, OLANZapine zydis, ondansetron, oxyCODONE    This patient has a central line   Reason for the central line remaining today? Parenteral medication    This patient has a urinary catheter   Reason for the urinary catheter remaining today? urinary retention/bladder outlet obstruction, acute or chronic     Malnutrition Diagnosis Status: Ongoing  Malnutrition Diagnosis: Severe malnutrition related to chronic disease or condition (CA, CHF)  As Evidenced by: pt estimated to be consuming <75% estimated energy needs x >1 month with areas of severe muscle and fat losses noted during nutrition exam  I agree with the dietitian's malnutrition diagnosis.      Assessment/Plan     Simi Sandoval is an 89-year-old F with PMH DLBCL of left breast, A-fib (on Eliquis), pulmonary hypertension, Hysterectomy, Thyroid disease,  CHF, NSTEMI, s/p uretral stent (12/13) who comes in for evaluation of left flank pain, left  sided abdominal pain and diarrhea at OSH on 12/27. Colonoscopy completed 12/30 and was found to have Cdiff colitis with pancolitis. Pt was started on Vanco for Pancolitis and was treated with zosyn for aspiration pneumonia seen on CT A/P S/P colonoscopy. CT imaging found multiple pulmonary nodules bilaterally, largest dependently on the left measuring 2.6 cm. Biopsy of left lung nodule done 12/11/2024 showed diffuse aggressive large B-cell lymphoma, recurrent by history. Heme/Onc saw the pt at OSH and recommended to transfer to [facility] for further work up, evaluation and treatment of disease recurrence. She was started on epcoritamab ramp up, however, course complicated by acute decompensated heart failure in the setting of severe MR and AI and encephalopathy. GOC discussion held 1/18/25 with patient and family, and decision was made to pursue comfort care and hospice, and hospice consult was  placed.     Update 1/21/25:   Son's met  with Hospice today , signed with Hospice, awaiting bed at Hospice facility.      ONC   #Diffuse large B-cell lymphoma   - Last treated with Tafasitamab 5/16/2023   - New lung nodules favoring recurrence of lymphoma back in December.    - last chemo in 2023   - Pathology from lung nodule biopsy 12/11/2024 confirmed disease recurrence.  - CT Flank 12/27/2024 shows disease progression in pulmonary nodules and new cortical lytic lesions in L4-5.   - S/p Dexamethasone 4 mg BID at OSH by Heme/Onc and ordered MRI per Oncology at OSH on admission to rule out cord compression.  - MRI 1/9: negative for cord compression, soft tissue masses involving the paraspinal musculature, left greater than right with involvement of the left lateral epidural space at the L5 level resulting in moderate spinal canal stenosis  - started cycle 1 epcoritamab (1/10/25). Check daily CRS labs, daily TLS labs  - Patient with lethargy and confusion 1/16/25, start Dexamethasone 10mg IV every 8 hours for 3 doses to treat her for ICANS as per Dr. Salcido.  - Patient A+Ox3 on 1/17/25, will continue dex q8 hours through the weekend and hold Epkinly while GOC discussion continues.     HEME  #Leukocytosis  -WBC 25.4 on admit  -most likely secondary to colitis vs. Dex  #Anemia  -Transfuse if Hgb<7  -Monitor CBC daily     CARDS   - LVEF 57%. Vegetation noted on prior echo.  # chronic HFpEF    - C/w BB, strict I and O's   #Acute decompensated heart failure in the setting of severe MR and AI  -ECHO (1/14): EF normal, Severe MVR, Severely dilated LA, Moderate to severe AVR, compared to prior exams new severe MR and AI   - Consulted HF (1/14): 40mg lasix BID IV, maintain K>4, Mg >2, strict I/O, daily weights  - Stopped lasix 1/15/25 as per HF  - cardiac MRI (1/16/25) c/w possible myocarditis per HF  # Paroxysmal Afib    - Continue home amiodarone, metoprolol XR, and ppx Eliquis  # Hypertension   - stop amlodipine as per  HF. Consider starting lisinopril     PULM  #Acute hypoxic Resp failure    #Aspiration Pneumonia  #ADHF  - Doesn't wear O2 at baseline, wean as tolerated  - Patient likely with aspiration pneumonia status post colonoscopy on 12/30/2024.  She was supine laying on her left side which is where the infiltrate was shown on chest x-ray.  Concern for aspiration during procedure  - S/p 7 day course of Zosyn at OSH  - CXR on admission shows with mild-to-moderate degree of pulmonary interstitial edema. Small left and right pleural effusion  - S/p bipap for acute hypoxic respiratory failure at OSH  - S/p intubation in 11/2024 for pneumonia and CHF exacerbation  - Repeat CXR (1/11) with pulmonary congestion, small left pleural effusion  - ongoing diuresis with BID IV lasix 40mg. Stopped lasix 1/15/25, continue to hold per HF 1/16  - c/f possible aspiration pneumonia. S/p Zosyn 1/16/25-1/19 (allergy listed but previously tolerated) stopped when patient confirmed hospice/comfort care     FEN/GI  Admit wt: 64 kg, current wt: 62.4 kg (1/16/25)  F: PRN  E: PRN  N: low path diet, minced and moist with thin liquids, only when patient alert with appropriate mentation  #GURINDER  -Cr baseline 0.9, now uptrended to 1.45. Found to have acute urinary retention 1/15. Ddx includes obstructive GURINDER, cardiorenal    -maki placed 1/15/25  # Hypercalcemia, improving  - sCa 12.6 on admit, 1/10: 12.4, 1/12: 10.5  - Increase mIVF to 100ml/hr, stopped fluids 1/10 with consideration of hx of CHF  - Zometa 4mg x1 (1/9)  - s/p Calcitonin BID x4 doses (1/10-1/12)  #Hyponatremia  :: 2/2 fluid volume overload  - Initiate fluid restriction of 1L (1/12-1/17), removed per RDN recs so patient can get supplement drinks  - Restart home NaCl tabs 1g BID, Dc'd on 1/14 due to possibly being hypervolemic hyponatremia, may improve with diuresising per HF, though HF currently (1/16) recommending holding diuresis  - urine studies 1/13/25: Urine Na 14, urine Cr 90, urine  osmol 589  - Monitor daily  #Emesis: swallow evaluation performed 1/15. 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 She denies any increase or change in her neuropathy symptoms. Per Neurology at OSH  - Continue supportive measures    - No further neurological testing needed at this point   #Encephalopathy  #altered mental status  - UA/UC (1/13): enteric bacilli   - UA/UC (1/15): negative  - blood cx (1/15) NGTD  -MRI brain (1/15): no evidence of acute hemorrhage, mass lesion or acute Infarction. No evidence of intracranial metastatic disease within limitations of a noncontrast enhanced examination.  - Suspect driven by other medical co-morbidities rather than ICANS. ICE score 9/10 (lost point due to pt unable to hold pen to write a sentence). Pt unable to do ICE score 1/16, pt refusing ICE score 1/17    ID  # Prophy: ACV, fluconazole  # C. diff colitis with pan colitis, resolved   - CT A/P (12/27): mild pancolitis, most pronounced distal rectosigmoid   - Colonoscopy (12/30): pseudomembranous enterocolitis, nearly resolved  - S/p PO Vanco at OSH, completed PO vancomycin 125mg qid course on admit with last doses 1/13/25     MSK   # Lumbar Spinal stenosis    - Started on Oxy 5 mg PRN, inc'd to 10mg PRN (1/11)  - cont with Robaxin 500 mg TID PRN     PSYCH   # Anxiety   - C/w Buspar 7.5 mg TID and Cymbalta  - consider Supportive Onc consult      ENDO   # Hyperthyroidism   - C/w methimazole      DISPO  - Full code  - R Mediport  - Primary Oncologist: Dr. Mac  - Patient to transfer to Hospice once bed is available.      Patient seen, examined, and discussed with   Loly Sarabia, APRN-CNP

## 2025-01-21 NOTE — CARE PLAN
The patient's goals for the shift include      The clinical goals for the shift include pt to maintain pain control      Problem: Respiratory  Goal: Clear secretions with interventions this shift  Outcome: Progressing  Goal: Minimize anxiety/maximize coping throughout shift  Outcome: Progressing  Goal: Minimal/no exertional discomfort or dyspnea this shift  Outcome: Progressing  Goal: No signs of respiratory distress (eg. Use of accessory muscles. Peds grunting)  Outcome: Progressing  Goal: Patent airway maintained this shift  Outcome: Progressing  Goal: Tolerate mechanical ventilation evidenced by VS/agitation level this shift  Outcome: Progressing  Goal: Tolerate pulmonary toileting this shift  Outcome: Progressing  Goal: Verbalize decreased shortness of breath this shift  Outcome: Progressing  Goal: Wean oxygen to maintain O2 saturation per order/standard this shift  Outcome: Progressing  Goal: Increase self care and/or family involvement in next 24 hours  Outcome: Progressing     Problem: Fall/Injury  Goal: Not fall by end of shift  Outcome: Progressing  Goal: Be free from injury by end of the shift  Outcome: Progressing  Goal: Verbalize understanding of personal risk factors for fall in the hospital  Outcome: Progressing  Goal: Verbalize understanding of risk factor reduction measures to prevent injury from fall in the home  Outcome: Progressing  Goal: Use assistive devices by end of the shift  Outcome: Progressing  Goal: Pace activities to prevent fatigue by end of the shift  Outcome: Progressing     Problem: Infection related to problem list condition  Goal: Infection will resolve through treatment  Outcome: Progressing     Problem: Skin  Goal: Decreased wound size/increased tissue granulation at next dressing change  Outcome: Progressing  Goal: Participates in plan/prevention/treatment measures  Outcome: Progressing  Goal: Prevent/manage excess moisture  Outcome: Progressing  Goal: Prevent/minimize  sheer/friction injuries  Outcome: Progressing  Goal: Promote/optimize nutrition  Outcome: Progressing  Goal: Promote skin healing  Outcome: Progressing     Problem: Pain  Goal: Takes deep breaths with improved pain control throughout the shift  Outcome: Progressing  Goal: Turns in bed with improved pain control throughout the shift  Outcome: Progressing  Goal: Walks with improved pain control throughout the shift  Outcome: Progressing  Goal: Performs ADL's with improved pain control throughout shift  Outcome: Progressing  Goal: Participates in PT with improved pain control throughout the shift  Outcome: Progressing  Goal: Free from opioid side effects throughout the shift  Outcome: Progressing  Goal: Free from acute confusion related to pain meds throughout the shift  Outcome: Progressing     Problem: Pain - Adult  Goal: Verbalizes/displays adequate comfort level or baseline comfort level  Outcome: Progressing     Problem: Safety - Adult  Goal: Free from fall injury  Outcome: Progressing     Problem: Discharge Planning  Goal: Discharge to home or other facility with appropriate resources  Outcome: Progressing     Problem: Chronic Conditions and Co-morbidities  Goal: Patient's chronic conditions and co-morbidity symptoms are monitored and maintained or improved  Outcome: Progressing

## 2025-01-21 NOTE — PROGRESS NOTES
01/21/25 1200   Discharge Planning   Living Arrangements Children   Support Systems Children   Assistance Needed total   Type of Residence Private residence   Who is requesting discharge planning? Patient   Home or Post Acute Services In home services   Type of Home Care Services Hospice   Expected Discharge Disposition HospiceMedic  ([facility]- waiting on bed)   What day is the transport expected? 01/21/25  ([facility] to schedule when bed available)     Pt's son met with [facility] again and signed consents with plan to transfer to [facility] when bed becomes available. [facility] SW will schedule transport and notify LSW. LSW to cont to follow through discharge.

## 2025-01-21 NOTE — NURSING NOTE
Pt needs dressing changed today 1/20/25 but family refused because they didn't want to cause any extra pain and wanted to wait if she is going to leave on hospice within the next day or 2.

## 2025-01-21 NOTE — PROGRESS NOTES
Occupational Therapy                 Therapy Communication Note    Patient Name: Simi Sandoval  MRN: [id number]  Department: [facility]  Room: [facility]3004-  Today's Date: 1/21/2025     Discipline: Occupational Therapy    OT Missed Visit: Yes     Missed Visit Reason: Missed Visit Reason:  (Pt has transitioned to comfort measures at this time, will d/c OT orders; please re-consult should there be a change in pt status.)    Missed Time: Attempt    01/21/25 at 8:35 AM   Grace Ruiz OT   Rehab Office: [phone]

## 2025-01-21 NOTE — CARE PLAN
Problem: Fall/Injury  Goal: Not fall by end of shift  Outcome: Progressing  Goal: Be free from injury by end of the shift  Outcome: Progressing  Goal: Verbalize understanding of personal risk factors for fall in the hospital  Outcome: Progressing  Goal: Verbalize understanding of risk factor reduction measures to prevent injury from fall in the home  Outcome: Progressing  Goal: Use assistive devices by end of the shift  Outcome: Progressing  Goal: Pace activities to prevent fatigue by end of the shift  Outcome: Progressing     Problem: Skin  Goal: Decreased wound size/increased tissue granulation at next dressing change  Outcome: Progressing  Flowsheets (Taken 1/21/2025 0614)  Decreased wound size/increased tissue granulation at next dressing change: Promote sleep for wound healing  Goal: Participates in plan/prevention/treatment measures  Outcome: Progressing  Flowsheets (Taken 1/21/2025 0614)  Participates in plan/prevention/treatment measures: Discuss with provider PT/OT consult  Goal: Prevent/manage excess moisture  Outcome: Progressing  Flowsheets (Taken 1/21/2025 0614)  Prevent/manage excess moisture: Cleanse incontinence/protect with barrier cream  Goal: Prevent/minimize sheer/friction injuries  Outcome: Progressing  Flowsheets (Taken 1/21/2025 0614)  Prevent/minimize sheer/friction injuries: Use pull sheet  Goal: Promote/optimize nutrition  Outcome: Progressing  Flowsheets (Taken 1/21/2025 0614)  Promote/optimize nutrition: Assist with feeding  Goal: Promote skin healing  Outcome: Progressing  Flowsheets (Taken 1/21/2025 0614)  Promote skin healing: Protective dressings over bony prominences     Problem: Pain  Goal: Takes deep breaths with improved pain control throughout the shift  Outcome: Progressing  Goal: Turns in bed with improved pain control throughout the shift  Outcome: Progressing  Goal: Walks with improved pain control throughout the shift  Outcome: Progressing  Goal: Performs ADL's with  improved pain control throughout shift  Outcome: Progressing  Goal: Participates in PT with improved pain control throughout the shift  Outcome: Progressing  Goal: Free from opioid side effects throughout the shift  Outcome: Progressing  Goal: Free from acute confusion related to pain meds throughout the shift  Outcome: Progressing

## 2025-01-22 VITALS
DIASTOLIC BLOOD PRESSURE: 72 MMHG | TEMPERATURE: 99.3 F | OXYGEN SATURATION: 98 % | WEIGHT: 137.79 LBS | HEIGHT: 63 IN | SYSTOLIC BLOOD PRESSURE: 134 MMHG | RESPIRATION RATE: 18 BRPM | HEART RATE: 79 BPM | BODY MASS INDEX: 24.41 KG/M2

## 2025-01-22 PROCEDURE — 99238 HOSP IP/OBS DSCHRG MGMT 30/<: CPT | Performed by: STUDENT IN AN ORGANIZED HEALTH CARE EDUCATION/TRAINING PROGRAM

## 2025-01-22 PROCEDURE — 2500000004 HC RX 250 GENERAL PHARMACY W/ HCPCS (ALT 636 FOR OP/ED)

## 2025-01-22 PROCEDURE — 2500000005 HC RX 250 GENERAL PHARMACY W/O HCPCS: Performed by: PHYSICIAN ASSISTANT

## 2025-01-22 PROCEDURE — 2500000001 HC RX 250 WO HCPCS SELF ADMINISTERED DRUGS (ALT 637 FOR MEDICARE OP)

## 2025-01-22 PROCEDURE — 51702 INSERT TEMP BLADDER CATH: CPT

## 2025-01-22 RX ORDER — ONDANSETRON 4 MG/1
4 TABLET, ORALLY DISINTEGRATING ORAL EVERY 8 HOURS PRN
Start: 2025-01-22 | End: 2025-01-29

## 2025-01-22 RX ORDER — TALC
6 POWDER (GRAM) TOPICAL NIGHTLY PRN
Start: 2025-01-22 | End: 2025-02-21

## 2025-01-22 RX ORDER — OXYCODONE HYDROCHLORIDE 10 MG/1
10 TABLET ORAL EVERY 6 HOURS PRN
Start: 2025-01-22 | End: 2025-01-29

## 2025-01-22 RX ORDER — DICLOFENAC SODIUM 10 MG/G
4 GEL TOPICAL 4 TIMES DAILY PRN
Start: 2025-01-22

## 2025-01-22 RX ORDER — DULOXETIN HYDROCHLORIDE 20 MG/1
20 CAPSULE, DELAYED RELEASE ORAL DAILY
Qty: 30 CAPSULE | Refills: 11 | Status: SHIPPED | OUTPATIENT
Start: 2025-01-23 | End: 2026-01-23

## 2025-01-22 RX ORDER — METHOCARBAMOL 500 MG/1
500 TABLET, FILM COATED ORAL EVERY 8 HOURS PRN
Start: 2025-01-22 | End: 2025-02-01

## 2025-01-22 RX ORDER — BUSPIRONE HYDROCHLORIDE 7.5 MG/1
7.5 TABLET ORAL 3 TIMES DAILY
Qty: 90 TABLET | Refills: 11 | Status: SHIPPED | OUTPATIENT
Start: 2025-01-22 | End: 2026-01-22

## 2025-01-22 RX ADMIN — BUSPIRONE HYDROCHLORIDE 7.5 MG: 15 TABLET ORAL at 08:31

## 2025-01-22 RX ADMIN — HYDROMORPHONE HYDROCHLORIDE 0.2 MG: 1 INJECTION, SOLUTION INTRAMUSCULAR; INTRAVENOUS; SUBCUTANEOUS at 06:34

## 2025-01-22 RX ADMIN — LIDOCAINE 4% 1 PATCH: 40 PATCH TOPICAL at 08:31

## 2025-01-22 RX ADMIN — DEXAMETHASONE SODIUM PHOSPHATE 10 MG: 10 INJECTION INTRAMUSCULAR; INTRAVENOUS at 08:31

## 2025-01-22 RX ADMIN — DEXAMETHASONE SODIUM PHOSPHATE 10 MG: 10 INJECTION INTRAMUSCULAR; INTRAVENOUS at 01:02

## 2025-01-22 RX ADMIN — DULOXETINE HYDROCHLORIDE 20 MG: 20 CAPSULE, DELAYED RELEASE ORAL at 08:31

## 2025-01-22 ASSESSMENT — PAIN SCALES - GENERAL
PAINLEVEL_OUTOF10: 4
PAINLEVEL_OUTOF10: 2

## 2025-01-22 ASSESSMENT — PAIN DESCRIPTION - LOCATION: LOCATION: GENERALIZED

## 2025-01-22 ASSESSMENT — PAIN - FUNCTIONAL ASSESSMENT: PAIN_FUNCTIONAL_ASSESSMENT: 0-10

## 2025-01-22 NOTE — PROGRESS NOTES
01/22/25 1100   Discharge Planning   Living Arrangements Children   Support Systems Children   Type of Residence Private residence   Who is requesting discharge planning? Patient   Home or Post Acute Services In home services   Type of Home Care Services Hospice   Expected Discharge Disposition HospiceMedi  ([facility]- waiting on bed)   Has discharge transport been arranged? Yes   What day is the transport expected? 01/22/25   What time is the transport expected? 1100  ([facility])     Notified by [facility] that [facility] has a bed available and they scheduled transport for this morning. LSW faxed over MAR and progress note as requested. Pt and sons aware and in agreement.

## 2025-01-22 NOTE — CARE PLAN
Problem: Respiratory  Goal: Clear secretions with interventions this shift  Outcome: Progressing  Goal: Minimize anxiety/maximize coping throughout shift  Outcome: Progressing  Goal: Minimal/no exertional discomfort or dyspnea this shift  Outcome: Progressing  Goal: No signs of respiratory distress (eg. Use of accessory muscles. Peds grunting)  Outcome: Progressing  Goal: Patent airway maintained this shift  Outcome: Progressing  Goal: Tolerate mechanical ventilation evidenced by VS/agitation level this shift  Outcome: Progressing  Goal: Tolerate pulmonary toileting this shift  Outcome: Progressing  Goal: Verbalize decreased shortness of breath this shift  Outcome: Progressing  Goal: Wean oxygen to maintain O2 saturation per order/standard this shift  Outcome: Progressing  Goal: Increase self care and/or family involvement in next 24 hours  Outcome: Progressing     Problem: Fall/Injury  Goal: Not fall by end of shift  Outcome: Progressing  Goal: Be free from injury by end of the shift  Outcome: Progressing  Goal: Verbalize understanding of personal risk factors for fall in the hospital  Outcome: Progressing  Goal: Verbalize understanding of risk factor reduction measures to prevent injury from fall in the home  Outcome: Progressing  Goal: Use assistive devices by end of the shift  Outcome: Progressing  Goal: Pace activities to prevent fatigue by end of the shift  Outcome: Progressing     Problem: Skin  Goal: Decreased wound size/increased tissue granulation at next dressing change  Outcome: Progressing  Flowsheets (Taken 1/22/2025 0657)  Decreased wound size/increased tissue granulation at next dressing change: Promote sleep for wound healing  Goal: Participates in plan/prevention/treatment measures  Outcome: Progressing  Flowsheets (Taken 1/22/2025 0657)  Participates in plan/prevention/treatment measures: Discuss with provider PT/OT consult  Goal: Prevent/manage excess moisture  Outcome: Progressing  Flowsheets  (Taken 1/22/2025 0657)  Prevent/manage excess moisture: Monitor for/manage infection if present  Goal: Prevent/minimize sheer/friction injuries  Outcome: Progressing  Flowsheets (Taken 1/22/2025 0657)  Prevent/minimize sheer/friction injuries: Use pull sheet  Goal: Promote/optimize nutrition  Outcome: Progressing  Flowsheets (Taken 1/22/2025 0657)  Promote/optimize nutrition: Monitor/record intake including meals  Goal: Promote skin healing  Outcome: Progressing  Flowsheets (Taken 1/22/2025 0657)  Promote skin healing: Turn/reposition every 2 hours/use positioning/transfer devices

## 2025-01-23 NOTE — DISCHARGE SUMMARY
Discharge Diagnosis  DLBCL (diffuse large B cell lymphoma)    Issues Requiring Follow-Up  Hospice     Test Results Pending At Discharge  Pending Labs       No current pending labs.            Hospital Course  Simi Sandoval is an 89-year-old F with PMH DLBCL of left breast, A-fib (on Eliquis), pulmonary hypertension, Hysterectomy, Thyroid disease,  CHF, NSTEMI, s/p uretral stent (12/13) who comes in for evaluation of left flank pain, left  sided abdominal pain  and diarrhea at OSH on 12/27. Colonoscopy completed 12/30 and was found to have Cdiff colitis  with pancolitis. Pt was started on Vanco for Pancolitis and was treated with zosyn for aspiration pneumonia seen on CT A/P S/P colonoscopy. CT imaging found multiple pulmonary nodules bilaterally, largest dependently on the left measuring 2.6 cm. Biopsy of left lung nodule done 12/11/2024 showed diffuse aggressive large B-cell lymphoma, recurrent by history. Heme/Onc saw the pt at OSH and recommended to transfer to [facility] for further work up, evaluation and treatment of disease recurrence.    Hospital course c/b:  DLBCL. She was noted to have worsening pulmonary nodules and lytic lesions to the spine at L4-5. A previous lung node biopsy was done in at OSH on 12/11/24 that was positive for disease. She was having significant pain in lower back and lower extremities, so an MRI was obtained to rule out cord compression and it was negative. She was started on epcoritamab on 1/10 and tolerated the first dose. Patient developed AMS 1/16 and was started on dex with some improvement (A+Ox3) but still somewhat confused, lacked insight into situation. Epkinly held starting 1/16 while GOC discussed.    Hypercalcemia of malignancy. She presented with a Ca+ level of 12.6 and was started cautiously (hx of CHF) on low maintenance IV fluids on admission. Her Ca+ level remained elevated and on 1/9 received a 4mg dose of zometa. On 1/10, IVF were stopped and she was given  calcitonin BID x4 doses (1/10-1/12). Her Ca+ levels eventually normalized.  C. Diff colitis/pancolitis. She had a colonoscopy on 12/30 at OSH that confirmed the colitis and she was started on PO vancomycin. She continued on vancomycin until 1/13/25.   Aspiration Pneumonia. She was noted to have pneumonia at OSH and was treated with 7 day course of IV zosyn. This was attributed to an aspiration event during the colonoscopy that she had done. She was on 2L NC due to the pneumonia (does not wear oxygen at basline) and eventually the oxygen requirements were increased to 4L with suspicion for possible aspiration PNA again, and patient restarted on zosyn. Zosyn stopped when pt made DNRCC.   UTI. UA/UC 1/13 positive for e coli. Patient already on zosyn for suspected PNA. Zosyn stopped when pt made DNRCC.     GOC discussion held 1/18/25, and patient and patient's family elected to proceed with comfort care and hospice. Hospice consult placed. Patient made DNR-CC on 1/19 after discussion with patient's sons Ethan and Ken. Patient discharged to [facility]  Pertinent Physical Exam At Time of Discharge  Physical Exam  Constitutional:       Appearance: She is ill-appearing.      Comments: lethargic   HENT:      Nose: Nose normal.      Mouth/Throat:      Mouth: Mucous membranes are dry.   Eyes:      Pupils: Pupils are equal, round, and reactive to light.   Cardiovascular:      Rate and Rhythm: Normal rate and regular rhythm.      Pulses: Normal pulses.      Heart sounds: Normal heart sounds.   Pulmonary:      Effort: Pulmonary effort is normal.      Breath sounds: Rhonchi present.      Comments: 4l NC  Abdominal:      General: Bowel sounds are normal.      Palpations: Abdomen is soft.   Musculoskeletal:         General: Swelling present.      Cervical back: Normal range of motion.   Skin:     General: Skin is warm and dry.      Capillary Refill: Capillary refill takes 2 to 3 seconds.   Neurological:      Mental Status: She  is disoriented.      Motor: Weakness present.   Psychiatric:         Mood and Affect: Mood normal.         Behavior: Behavior normal.         Home Medications     Medication List      START taking these medications     busPIRone 7.5 mg tablet; Commonly known as: Buspar; Take 1 tablet (7.5   mg) by mouth 3 times a day.   diclofenac sodium 1 % gel; Commonly known as: Voltaren; Apply 4.5 inches   (4 g) topically 4 times a day as needed (for pain).   * DULoxetine 20 mg DR capsule; Commonly known as: Cymbalta; Take 1   capsule (20 mg) by mouth once daily. Do not crush or chew.   * DULoxetine 20 mg DR capsule; Commonly known as: Cymbalta; Take 1   capsule (20 mg) by mouth once daily. Do not crush or chew.; Start taking   on: January 23, 2025   methocarbamol 500 mg tablet; Commonly known as: Robaxin; Take 1 tablet   (500 mg) by mouth every 8 hours if needed for muscle spasms for up to 10   days.   moisturizing mouth solution; Commonly known as: Biotene Dry Mouth; Swish   and spit 15 mL 3 times a day as needed (dry mouth).   ondansetron ODT 4 mg disintegrating tablet; Commonly known as:   Zofran-ODT; Dissolve 1 tablet (4 mg) in the mouth every 8 hours if needed   for nausea or vomiting for up to 7 days.   oxyCODONE 10 mg immediate release tablet; Commonly known as: Roxicodone;   Take 1 tablet (10 mg) by mouth every 6 hours if needed for severe pain (7   - 10) for up to 7 days.   oxygen gas therapy; Commonly known as: O2; Inhale 4 L/min continuously.  * This list has 2 medication(s) that are the same as other medications   prescribed for you. Read the directions carefully, and ask your doctor or   other care provider to review them with you.     CHANGE how you take these medications     melatonin 3 mg tablet; Take 2 tablets (6 mg) by mouth as needed at   bedtime for sleep.; What changed: how much to take, reasons to take this     CONTINUE taking these medications     acetaminophen 325 mg tablet; Commonly known as: Tylenol    famotidine 20 mg tablet; Commonly known as: Pepcid   HYDROcodone-acetaminophen  mg tablet; Commonly known as: Norco   hydrOXYzine HCL 10 mg tablet; Commonly known as: Atarax; Take 0.5   tablets (5 mg) by mouth 1 time for 1 dose.   Imodium A-D 2 mg capsule; Generic drug: loperamide   lidocaine 5 % patch; Commonly known as: Lidoderm   LORazepam 0.5 mg tablet; Commonly known as: Ativan; Take 1 tablet (0.5   mg) by mouth see administration instructions. Once daily as needed for   anxiety.   methIMAzole 5 mg tablet; Commonly known as: Tapazole   rOPINIRole 0.5 mg tablet; Commonly known as: Requip     STOP taking these medications     amLODIPine 5 mg tablet; Commonly known as: Norvasc   Eliquis 5 mg tablet; Generic drug: apixaban   magnesium chloride 71.5 mg tablet,delayed release (DR/EC)   magnesium oxide 400 mg (241.3 mg magnesium) tablet; Commonly known as:   Mag-Ox   metoprolol succinate  mg 24 hr tablet; Commonly known as:   Toprol-XL   sodium chloride 1,000 mg tablet       Outpatient Follow-Up  Future Appointments   Date Time Provider Department [place]   2/4/2025  3:30 PM INF 12 MINOFF [id number] [facility]   2/4/2025  4:00 PM Stella Mac MD [id number] [facility]       CORNELIUS Combs-CNP

## 2025-02-04 ENCOUNTER — APPOINTMENT (OUTPATIENT)
Dept: HEMATOLOGY/ONCOLOGY | Facility: CLINIC | Age: 89
End: 2025-02-04
Payer: MEDICARE